# Patient Record
Sex: FEMALE | Race: WHITE | NOT HISPANIC OR LATINO | Employment: OTHER | RURAL
[De-identification: names, ages, dates, MRNs, and addresses within clinical notes are randomized per-mention and may not be internally consistent; named-entity substitution may affect disease eponyms.]

---

## 2019-07-16 ENCOUNTER — HISTORICAL (OUTPATIENT)
Dept: ADMINISTRATIVE | Facility: HOSPITAL | Age: 64
End: 2019-07-16

## 2019-07-17 LAB
LAB AP CLINICAL INFORMATION: NORMAL
LAB AP DIAGNOSIS - HISTORICAL: NORMAL
LAB AP GROSS PATHOLOGY - HISTORICAL: NORMAL
LAB AP SPECIMEN SUBMITTED - HISTORICAL: NORMAL

## 2020-03-11 ENCOUNTER — HISTORICAL (OUTPATIENT)
Dept: ADMINISTRATIVE | Facility: HOSPITAL | Age: 65
End: 2020-03-11

## 2020-04-22 ENCOUNTER — HISTORICAL (OUTPATIENT)
Dept: ADMINISTRATIVE | Facility: HOSPITAL | Age: 65
End: 2020-04-22

## 2020-09-24 ENCOUNTER — HISTORICAL (OUTPATIENT)
Dept: ADMINISTRATIVE | Facility: HOSPITAL | Age: 65
End: 2020-09-24

## 2020-10-20 ENCOUNTER — HISTORICAL (OUTPATIENT)
Dept: ADMINISTRATIVE | Facility: HOSPITAL | Age: 65
End: 2020-10-20

## 2021-05-26 ENCOUNTER — OFFICE VISIT (OUTPATIENT)
Dept: VASCULAR SURGERY | Facility: CLINIC | Age: 66
End: 2021-05-26
Payer: MEDICARE

## 2021-05-26 VITALS
WEIGHT: 208.63 LBS | HEIGHT: 67 IN | HEART RATE: 80 BPM | RESPIRATION RATE: 12 BRPM | SYSTOLIC BLOOD PRESSURE: 120 MMHG | BODY MASS INDEX: 32.74 KG/M2 | DIASTOLIC BLOOD PRESSURE: 84 MMHG

## 2021-05-26 DIAGNOSIS — M79.672 PAIN IN BOTH FEET: ICD-10-CM

## 2021-05-26 DIAGNOSIS — R23.8 OTHER SKIN CHANGES: Primary | ICD-10-CM

## 2021-05-26 DIAGNOSIS — M79.671 PAIN IN BOTH FEET: ICD-10-CM

## 2021-05-26 DIAGNOSIS — Z86.711 HISTORY OF PULMONARY EMBOLUS (PE): ICD-10-CM

## 2021-05-26 DIAGNOSIS — M79.605 LEG PAIN, BILATERAL: Primary | ICD-10-CM

## 2021-05-26 DIAGNOSIS — Z86.718 HISTORY OF DVT (DEEP VEIN THROMBOSIS): ICD-10-CM

## 2021-05-26 DIAGNOSIS — M79.604 LEG PAIN, BILATERAL: Primary | ICD-10-CM

## 2021-05-26 PROCEDURE — 99214 OFFICE O/P EST MOD 30 MIN: CPT | Mod: S$PBB,,, | Performed by: NURSE PRACTITIONER

## 2021-05-26 PROCEDURE — 99213 OFFICE O/P EST LOW 20 MIN: CPT | Mod: PBBFAC | Performed by: NURSE PRACTITIONER

## 2021-05-26 PROCEDURE — 99214 PR OFFICE/OUTPT VISIT, EST, LEVL IV, 30-39 MIN: ICD-10-PCS | Mod: S$PBB,,, | Performed by: NURSE PRACTITIONER

## 2021-05-26 RX ORDER — CYANOCOBALAMIN 1000 UG/ML
1000 INJECTION, SOLUTION INTRAMUSCULAR; SUBCUTANEOUS
COMMUNITY
Start: 2021-05-13 | End: 2022-06-02

## 2021-05-26 RX ORDER — LANOLIN ALCOHOL/MO/W.PET/CERES
1 CREAM (GRAM) TOPICAL DAILY
COMMUNITY
Start: 2021-02-19

## 2021-05-26 RX ORDER — SERTRALINE HYDROCHLORIDE 100 MG/1
100 TABLET, FILM COATED ORAL DAILY
COMMUNITY
Start: 2021-04-06 | End: 2022-01-12

## 2021-05-26 RX ORDER — RANOLAZINE 500 MG/1
500 TABLET, EXTENDED RELEASE ORAL 2 TIMES DAILY
COMMUNITY
Start: 2021-05-25

## 2021-05-26 RX ORDER — POTASSIUM CHLORIDE 750 MG/1
10 TABLET, EXTENDED RELEASE ORAL DAILY
COMMUNITY
Start: 2021-04-20 | End: 2021-11-11 | Stop reason: SDUPTHER

## 2021-05-26 RX ORDER — ASCORBIC ACID 500 MG
500 TABLET ORAL DAILY
COMMUNITY
End: 2022-05-17

## 2021-05-26 RX ORDER — ACETAMINOPHEN 500 MG
5000 TABLET ORAL DAILY
COMMUNITY
End: 2022-05-17

## 2021-05-26 RX ORDER — LEVOTHYROXINE SODIUM 25 UG/1
25 TABLET ORAL DAILY
COMMUNITY
Start: 2021-04-22 | End: 2021-07-29 | Stop reason: SDUPTHER

## 2021-05-26 RX ORDER — EZETIMIBE 10 MG/1
10 TABLET ORAL DAILY
COMMUNITY
Start: 2021-05-25

## 2021-05-26 RX ORDER — FUROSEMIDE 20 MG/1
20 TABLET ORAL DAILY
COMMUNITY
Start: 2021-03-08 | End: 2021-09-27 | Stop reason: SDUPTHER

## 2021-05-26 RX ORDER — LACTULOSE 10 G/15ML
SOLUTION ORAL; RECTAL
COMMUNITY
Start: 2021-05-10 | End: 2021-11-11

## 2021-05-26 RX ORDER — OMEPRAZOLE 20 MG/1
20 CAPSULE, DELAYED RELEASE ORAL DAILY
COMMUNITY
Start: 2021-05-07 | End: 2022-02-25

## 2021-05-26 RX ORDER — LOSARTAN POTASSIUM 50 MG/1
TABLET ORAL
COMMUNITY
Start: 2021-04-19 | End: 2022-05-17

## 2021-06-30 RX ORDER — SPIRONOLACTONE 25 MG/1
25 TABLET ORAL DAILY
Qty: 90 TABLET | Refills: 1 | Status: SHIPPED | OUTPATIENT
Start: 2021-06-30 | End: 2022-01-03

## 2021-06-30 RX ORDER — SPIRONOLACTONE 25 MG/1
25 TABLET ORAL DAILY
COMMUNITY
Start: 2021-03-25 | End: 2021-06-30 | Stop reason: SDUPTHER

## 2021-07-08 ENCOUNTER — OFFICE VISIT (OUTPATIENT)
Dept: FAMILY MEDICINE | Facility: CLINIC | Age: 66
End: 2021-07-08
Payer: MEDICARE

## 2021-07-08 VITALS
TEMPERATURE: 98 F | DIASTOLIC BLOOD PRESSURE: 85 MMHG | BODY MASS INDEX: 33.69 KG/M2 | OXYGEN SATURATION: 98 % | HEART RATE: 64 BPM | SYSTOLIC BLOOD PRESSURE: 141 MMHG | WEIGHT: 214.63 LBS | HEIGHT: 67 IN

## 2021-07-08 DIAGNOSIS — R30.0 DYSURIA: Primary | ICD-10-CM

## 2021-07-08 DIAGNOSIS — N39.3 STRESS INCONTINENCE OF URINE: ICD-10-CM

## 2021-07-08 DIAGNOSIS — N30.01 ACUTE CYSTITIS WITH HEMATURIA: ICD-10-CM

## 2021-07-08 LAB
BILIRUB UR QL STRIP: NEGATIVE
GLUCOSE UR QL STRIP: NEGATIVE
KETONES UR QL STRIP: NEGATIVE
LEUKOCYTE ESTERASE UR QL STRIP: POSITIVE
PH, POC UA: 8.5
POC BLOOD, URINE: POSITIVE
POC NITRATES, URINE: POSITIVE
PROT UR QL STRIP: POSITIVE
SP GR UR STRIP: 1.02 (ref 1–1.03)
UROBILINOGEN UR STRIP-ACNC: 0.2 (ref 0.1–1.1)

## 2021-07-08 PROCEDURE — 99213 PR OFFICE/OUTPT VISIT, EST, LEVL III, 20-29 MIN: ICD-10-PCS | Mod: ,,, | Performed by: FAMILY MEDICINE

## 2021-07-08 PROCEDURE — 81003 URINALYSIS AUTO W/O SCOPE: CPT | Mod: RHCUB | Performed by: FAMILY MEDICINE

## 2021-07-08 PROCEDURE — 99213 OFFICE O/P EST LOW 20 MIN: CPT | Mod: ,,, | Performed by: FAMILY MEDICINE

## 2021-07-08 RX ORDER — CIPROFLOXACIN 250 MG/1
250 TABLET, FILM COATED ORAL 2 TIMES DAILY
Qty: 14 TABLET | Refills: 0 | Status: SHIPPED | OUTPATIENT
Start: 2021-07-08 | End: 2021-10-27

## 2021-07-08 RX ORDER — WARFARIN SODIUM 5 MG/1
5 TABLET ORAL DAILY
COMMUNITY
Start: 2021-05-25

## 2021-07-29 RX ORDER — LEVOTHYROXINE SODIUM 25 UG/1
25 TABLET ORAL DAILY
Qty: 90 TABLET | Refills: 1 | Status: SHIPPED | OUTPATIENT
Start: 2021-07-29 | End: 2022-02-04

## 2021-08-24 ENCOUNTER — OFFICE VISIT (OUTPATIENT)
Dept: OBSTETRICS AND GYNECOLOGY | Facility: CLINIC | Age: 66
End: 2021-08-24
Payer: MEDICARE

## 2021-08-24 VITALS
BODY MASS INDEX: 33.65 KG/M2 | DIASTOLIC BLOOD PRESSURE: 76 MMHG | HEIGHT: 67 IN | WEIGHT: 214.38 LBS | SYSTOLIC BLOOD PRESSURE: 136 MMHG

## 2021-08-24 DIAGNOSIS — Z12.4 SCREENING FOR MALIGNANT NEOPLASM OF THE CERVIX: Primary | ICD-10-CM

## 2021-08-24 DIAGNOSIS — R30.0 DYSURIA: ICD-10-CM

## 2021-08-24 DIAGNOSIS — R32 URINARY INCONTINENCE, UNSPECIFIED TYPE: ICD-10-CM

## 2021-08-24 LAB
BACTERIA #/AREA URNS HPF: ABNORMAL /HPF
BILIRUB UR QL STRIP: NEGATIVE
CLARITY UR: CLEAR
COLOR UR: YELLOW
GLUCOSE UR STRIP-MCNC: NEGATIVE MG/DL
HYALINE CASTS #/AREA URNS LPF: ABNORMAL /LPF
KETONES UR STRIP-SCNC: NEGATIVE MG/DL
LEUKOCYTE ESTERASE UR QL STRIP: NEGATIVE
NITRITE UR QL STRIP: NEGATIVE
PH UR STRIP: 6 PH UNITS
PROT UR QL STRIP: NEGATIVE
RBC # UR STRIP: NEGATIVE /UL
RBC #/AREA URNS HPF: ABNORMAL /HPF
SP GR UR STRIP: 1.01
UROBILINOGEN UR STRIP-ACNC: 0.2 MG/DL
WBC #/AREA URNS HPF: ABNORMAL /HPF

## 2021-08-24 PROCEDURE — G0123 SCREEN CERV/VAG THIN LAYER: HCPCS | Mod: GCY | Performed by: OBSTETRICS & GYNECOLOGY

## 2021-08-24 PROCEDURE — 87086 CULTURE, URINE: ICD-10-PCS | Mod: ,,, | Performed by: CLINICAL MEDICAL LABORATORY

## 2021-08-24 PROCEDURE — 99214 OFFICE O/P EST MOD 30 MIN: CPT | Mod: PBBFAC | Performed by: OBSTETRICS & GYNECOLOGY

## 2021-08-24 PROCEDURE — 81001 URINALYSIS: ICD-10-PCS | Mod: ,,, | Performed by: CLINICAL MEDICAL LABORATORY

## 2021-08-24 PROCEDURE — 99203 PR OFFICE/OUTPT VISIT, NEW, LEVL III, 30-44 MIN: ICD-10-PCS | Mod: S$PBB,,, | Performed by: OBSTETRICS & GYNECOLOGY

## 2021-08-24 PROCEDURE — 99203 OFFICE O/P NEW LOW 30 MIN: CPT | Mod: S$PBB,,, | Performed by: OBSTETRICS & GYNECOLOGY

## 2021-08-24 PROCEDURE — 81001 URINALYSIS AUTO W/SCOPE: CPT | Mod: ,,, | Performed by: CLINICAL MEDICAL LABORATORY

## 2021-08-24 PROCEDURE — 87086 URINE CULTURE/COLONY COUNT: CPT | Mod: ,,, | Performed by: CLINICAL MEDICAL LABORATORY

## 2021-08-26 LAB
GH SERPL-MCNC: NORMAL NG/ML
INSULIN SERPL-ACNC: NORMAL U[IU]/ML
LAB AP CLINICAL INFORMATION: NORMAL
LAB AP GYN INTERPRETATION: NEGATIVE
LAB AP PAP DISCLAIMER COMMENTS: NORMAL
RENIN PLAS-CCNC: NORMAL NG/ML/H
UA COMPLETE W REFLEX CULTURE PNL UR: NO GROWTH

## 2021-09-20 ENCOUNTER — OFFICE VISIT (OUTPATIENT)
Dept: OBSTETRICS AND GYNECOLOGY | Facility: CLINIC | Age: 66
End: 2021-09-20
Payer: MEDICARE

## 2021-09-20 VITALS — DIASTOLIC BLOOD PRESSURE: 84 MMHG | SYSTOLIC BLOOD PRESSURE: 130 MMHG

## 2021-09-20 DIAGNOSIS — R30.0 DYSURIA: Primary | ICD-10-CM

## 2021-09-20 LAB
BACTERIA #/AREA URNS HPF: ABNORMAL /HPF
BILIRUB UR QL STRIP: NEGATIVE
CLARITY UR: CLEAR
COLOR UR: YELLOW
GLUCOSE UR STRIP-MCNC: NEGATIVE MG/DL
KETONES UR STRIP-SCNC: NEGATIVE MG/DL
LEUKOCYTE ESTERASE UR QL STRIP: ABNORMAL
NITRITE UR QL STRIP: NEGATIVE
PH UR STRIP: 6.5 PH UNITS
PROT UR QL STRIP: NEGATIVE
RBC # UR STRIP: ABNORMAL /UL
RBC #/AREA URNS HPF: ABNORMAL /HPF
SP GR UR STRIP: 1.01
UROBILINOGEN UR STRIP-ACNC: 0.2 MG/DL
WBC #/AREA URNS HPF: ABNORMAL /HPF
WBC CLUMPS, UA: ABNORMAL /HPF

## 2021-09-20 PROCEDURE — 51725 SIMPLE CYSTOMETROGRAM: CPT | Mod: 26,S$PBB,, | Performed by: OBSTETRICS & GYNECOLOGY

## 2021-09-20 PROCEDURE — 87086 URINE CULTURE/COLONY COUNT: CPT | Mod: ,,, | Performed by: CLINICAL MEDICAL LABORATORY

## 2021-09-20 PROCEDURE — 81001 URINALYSIS: ICD-10-PCS | Mod: ,,, | Performed by: CLINICAL MEDICAL LABORATORY

## 2021-09-20 PROCEDURE — 87186 SC STD MICRODIL/AGAR DIL: CPT | Mod: ,,, | Performed by: CLINICAL MEDICAL LABORATORY

## 2021-09-20 PROCEDURE — 87077 CULTURE, URINE: ICD-10-PCS | Mod: ,,, | Performed by: CLINICAL MEDICAL LABORATORY

## 2021-09-20 PROCEDURE — 87086 CULTURE, URINE: ICD-10-PCS | Mod: ,,, | Performed by: CLINICAL MEDICAL LABORATORY

## 2021-09-20 PROCEDURE — 87077 CULTURE AEROBIC IDENTIFY: CPT | Mod: ,,, | Performed by: CLINICAL MEDICAL LABORATORY

## 2021-09-20 PROCEDURE — 87186 CULTURE, URINE: ICD-10-PCS | Mod: ,,, | Performed by: CLINICAL MEDICAL LABORATORY

## 2021-09-20 PROCEDURE — 99214 OFFICE O/P EST MOD 30 MIN: CPT | Mod: PBBFAC,25 | Performed by: OBSTETRICS & GYNECOLOGY

## 2021-09-20 PROCEDURE — 81001 URINALYSIS AUTO W/SCOPE: CPT | Mod: ,,, | Performed by: CLINICAL MEDICAL LABORATORY

## 2021-09-20 PROCEDURE — 51725 SIMPLE CYSTOMETROGRAM: CPT | Mod: PBBFAC | Performed by: OBSTETRICS & GYNECOLOGY

## 2021-09-20 PROCEDURE — 51725 PR SIMPLE CYSTOMETROGRAM: ICD-10-PCS | Mod: 26,S$PBB,, | Performed by: OBSTETRICS & GYNECOLOGY

## 2021-09-21 ENCOUNTER — TELEPHONE (OUTPATIENT)
Dept: OBSTETRICS AND GYNECOLOGY | Facility: CLINIC | Age: 66
End: 2021-09-21

## 2021-09-22 ENCOUNTER — TELEPHONE (OUTPATIENT)
Dept: OBSTETRICS AND GYNECOLOGY | Facility: CLINIC | Age: 66
End: 2021-09-22

## 2021-09-22 LAB — UA COMPLETE W REFLEX CULTURE PNL UR: ABNORMAL

## 2021-09-27 RX ORDER — FUROSEMIDE 20 MG/1
20 TABLET ORAL DAILY
Qty: 90 TABLET | Refills: 0 | Status: SHIPPED | OUTPATIENT
Start: 2021-09-27 | End: 2022-01-12

## 2021-10-27 ENCOUNTER — OFFICE VISIT (OUTPATIENT)
Dept: FAMILY MEDICINE | Facility: CLINIC | Age: 66
End: 2021-10-27
Payer: MEDICARE

## 2021-10-27 VITALS
HEART RATE: 79 BPM | DIASTOLIC BLOOD PRESSURE: 81 MMHG | TEMPERATURE: 98 F | BODY MASS INDEX: 33.33 KG/M2 | HEIGHT: 67 IN | SYSTOLIC BLOOD PRESSURE: 115 MMHG | WEIGHT: 212.38 LBS | OXYGEN SATURATION: 97 %

## 2021-10-27 DIAGNOSIS — M54.2 NECK PAIN: ICD-10-CM

## 2021-10-27 DIAGNOSIS — M62.838 TRAPEZIUS MUSCLE SPASM: ICD-10-CM

## 2021-10-27 DIAGNOSIS — R30.0 DYSURIA: Primary | ICD-10-CM

## 2021-10-27 DIAGNOSIS — R53.83 FATIGUE, UNSPECIFIED TYPE: ICD-10-CM

## 2021-10-27 LAB
ANION GAP SERPL CALCULATED.3IONS-SCNC: 8 MMOL/L (ref 7–16)
BASOPHILS # BLD AUTO: 0.04 K/UL (ref 0–0.2)
BASOPHILS NFR BLD AUTO: 0.5 % (ref 0–1)
BILIRUB SERPL-MCNC: NEGATIVE MG/DL
BLOOD URINE, POC: ABNORMAL
BUN SERPL-MCNC: 15 MG/DL (ref 7–18)
BUN/CREAT SERPL: 13 (ref 6–20)
CALCIUM SERPL-MCNC: 9.2 MG/DL (ref 8.5–10.1)
CHLORIDE SERPL-SCNC: 105 MMOL/L (ref 98–107)
CO2 SERPL-SCNC: 28 MMOL/L (ref 21–32)
COLOR, POC UA: YELLOW
CREAT SERPL-MCNC: 1.19 MG/DL (ref 0.55–1.02)
DIFFERENTIAL METHOD BLD: ABNORMAL
EOSINOPHIL # BLD AUTO: 0.18 K/UL (ref 0–0.5)
EOSINOPHIL NFR BLD AUTO: 2.4 % (ref 1–4)
ERYTHROCYTE [DISTWIDTH] IN BLOOD BY AUTOMATED COUNT: 12.6 % (ref 11.5–14.5)
GLUCOSE SERPL-MCNC: 107 MG/DL (ref 74–106)
GLUCOSE UR QL STRIP: NEGATIVE
HCT VFR BLD AUTO: 42.5 % (ref 38–47)
HGB BLD-MCNC: 14.5 G/DL (ref 12–16)
IMM GRANULOCYTES # BLD AUTO: 0.02 K/UL (ref 0–0.04)
IMM GRANULOCYTES NFR BLD: 0.3 % (ref 0–0.4)
KETONES UR QL STRIP: NEGATIVE
LEUKOCYTE ESTERASE URINE, POC: ABNORMAL
LYMPHOCYTES # BLD AUTO: 2.05 K/UL (ref 1–4.8)
LYMPHOCYTES NFR BLD AUTO: 27.6 % (ref 27–41)
MCH RBC QN AUTO: 31.6 PG (ref 27–31)
MCHC RBC AUTO-ENTMCNC: 34.1 G/DL (ref 32–36)
MCV RBC AUTO: 92.6 FL (ref 80–96)
MONOCYTES # BLD AUTO: 0.77 K/UL (ref 0–0.8)
MONOCYTES NFR BLD AUTO: 10.4 % (ref 2–6)
MPC BLD CALC-MCNC: 9.9 FL (ref 9.4–12.4)
NEUTROPHILS # BLD AUTO: 4.37 K/UL (ref 1.8–7.7)
NEUTROPHILS NFR BLD AUTO: 58.8 % (ref 53–65)
NITRITE, POC UA: POSITIVE
NRBC # BLD AUTO: 0 X10E3/UL
NRBC, AUTO (.00): 0 %
PH, POC UA: 7
PLATELET # BLD AUTO: 234 K/UL (ref 150–400)
POTASSIUM SERPL-SCNC: 4.4 MMOL/L (ref 3.5–5.1)
PROTEIN, POC: NEGATIVE
RBC # BLD AUTO: 4.59 M/UL (ref 4.2–5.4)
SODIUM SERPL-SCNC: 137 MMOL/L (ref 136–145)
SPECIFIC GRAVITY, POC UA: 1.02
T4 FREE SERPL-MCNC: 1.23 NG/DL (ref 0.76–1.46)
TSH SERPL DL<=0.005 MIU/L-ACNC: 2.02 UIU/ML (ref 0.36–3.74)
UROBILINOGEN, POC UA: 0.2
WBC # BLD AUTO: 7.43 K/UL (ref 4.5–11)

## 2021-10-27 PROCEDURE — 20552 NJX 1/MLT TRIGGER POINT 1/2: CPT | Mod: ,,, | Performed by: FAMILY MEDICINE

## 2021-10-27 PROCEDURE — 84443 ASSAY THYROID STIM HORMONE: CPT | Mod: ,,, | Performed by: CLINICAL MEDICAL LABORATORY

## 2021-10-27 PROCEDURE — 85025 COMPLETE CBC W/AUTO DIFF WBC: CPT | Mod: ,,, | Performed by: CLINICAL MEDICAL LABORATORY

## 2021-10-27 PROCEDURE — 85025 CBC WITH DIFFERENTIAL: ICD-10-PCS | Mod: ,,, | Performed by: CLINICAL MEDICAL LABORATORY

## 2021-10-27 PROCEDURE — 84439 T4, FREE: ICD-10-PCS | Mod: ,,, | Performed by: CLINICAL MEDICAL LABORATORY

## 2021-10-27 PROCEDURE — 20552 TRIGGER POINT INJECTION: ICD-10-PCS | Mod: ,,, | Performed by: FAMILY MEDICINE

## 2021-10-27 PROCEDURE — 84439 ASSAY OF FREE THYROXINE: CPT | Mod: ,,, | Performed by: CLINICAL MEDICAL LABORATORY

## 2021-10-27 PROCEDURE — 80048 BASIC METABOLIC PANEL: ICD-10-PCS | Mod: ,,, | Performed by: CLINICAL MEDICAL LABORATORY

## 2021-10-27 PROCEDURE — 84443 TSH: ICD-10-PCS | Mod: ,,, | Performed by: CLINICAL MEDICAL LABORATORY

## 2021-10-27 PROCEDURE — 99214 PR OFFICE/OUTPT VISIT, EST, LEVL IV, 30-39 MIN: ICD-10-PCS | Mod: ,,, | Performed by: FAMILY MEDICINE

## 2021-10-27 PROCEDURE — 80048 BASIC METABOLIC PNL TOTAL CA: CPT | Mod: ,,, | Performed by: CLINICAL MEDICAL LABORATORY

## 2021-10-27 PROCEDURE — 99214 OFFICE O/P EST MOD 30 MIN: CPT | Mod: ,,, | Performed by: FAMILY MEDICINE

## 2021-10-27 PROCEDURE — 81003 URINALYSIS AUTO W/O SCOPE: CPT | Mod: RHCUB | Performed by: FAMILY MEDICINE

## 2021-10-27 RX ORDER — METHYLPREDNISOLONE ACETATE 80 MG/ML
40 INJECTION, SUSPENSION INTRA-ARTICULAR; INTRALESIONAL; INTRAMUSCULAR; SOFT TISSUE
Status: DISCONTINUED | OUTPATIENT
Start: 2021-10-27 | End: 2021-10-27 | Stop reason: HOSPADM

## 2021-10-27 RX ORDER — SULFAMETHOXAZOLE AND TRIMETHOPRIM 800; 160 MG/1; MG/1
1 TABLET ORAL 2 TIMES DAILY
Qty: 20 TABLET | Refills: 0 | Status: SHIPPED | OUTPATIENT
Start: 2021-10-27 | End: 2021-11-11 | Stop reason: ALTCHOICE

## 2021-10-27 RX ORDER — OXYBUTYNIN CHLORIDE 5 MG/1
1 TABLET ORAL 2 TIMES DAILY
COMMUNITY
Start: 2021-09-21

## 2021-10-27 RX ADMIN — METHYLPREDNISOLONE ACETATE 40 MG: 80 INJECTION, SUSPENSION INTRA-ARTICULAR; INTRALESIONAL; INTRAMUSCULAR; SOFT TISSUE at 08:10

## 2021-11-11 ENCOUNTER — OFFICE VISIT (OUTPATIENT)
Dept: FAMILY MEDICINE | Facility: CLINIC | Age: 66
End: 2021-11-11
Payer: MEDICARE

## 2021-11-11 VITALS
HEART RATE: 71 BPM | BODY MASS INDEX: 33.56 KG/M2 | HEIGHT: 67 IN | DIASTOLIC BLOOD PRESSURE: 78 MMHG | WEIGHT: 213.81 LBS | TEMPERATURE: 97 F | OXYGEN SATURATION: 95 % | SYSTOLIC BLOOD PRESSURE: 123 MMHG

## 2021-11-11 DIAGNOSIS — R05.9 COUGH: ICD-10-CM

## 2021-11-11 DIAGNOSIS — J01.00 ACUTE NON-RECURRENT MAXILLARY SINUSITIS: Primary | ICD-10-CM

## 2021-11-11 PROCEDURE — 99213 PR OFFICE/OUTPT VISIT, EST, LEVL III, 20-29 MIN: ICD-10-PCS | Mod: 25,,, | Performed by: FAMILY MEDICINE

## 2021-11-11 PROCEDURE — 96372 THER/PROPH/DIAG INJ SC/IM: CPT | Mod: ,,, | Performed by: FAMILY MEDICINE

## 2021-11-11 PROCEDURE — 99213 OFFICE O/P EST LOW 20 MIN: CPT | Mod: 25,,, | Performed by: FAMILY MEDICINE

## 2021-11-11 PROCEDURE — 96372 PR INJECTION,THERAP/PROPH/DIAG2ST, IM OR SUBCUT: ICD-10-PCS | Mod: ,,, | Performed by: FAMILY MEDICINE

## 2021-11-11 RX ORDER — CIPROFLOXACIN 250 MG/1
250 TABLET, FILM COATED ORAL 2 TIMES DAILY
Qty: 20 TABLET | Refills: 0 | Status: SHIPPED | OUTPATIENT
Start: 2021-11-11 | End: 2022-05-17 | Stop reason: ALTCHOICE

## 2021-11-11 RX ORDER — CEFTRIAXONE 1 G/1
1 INJECTION, POWDER, FOR SOLUTION INTRAMUSCULAR; INTRAVENOUS
Status: COMPLETED | OUTPATIENT
Start: 2021-11-11 | End: 2021-11-11

## 2021-11-11 RX ORDER — METHYLPREDNISOLONE ACETATE 80 MG/ML
40 INJECTION, SUSPENSION INTRA-ARTICULAR; INTRALESIONAL; INTRAMUSCULAR; SOFT TISSUE
Status: COMPLETED | OUTPATIENT
Start: 2021-11-11 | End: 2021-11-11

## 2021-11-11 RX ORDER — DEXAMETHASONE SODIUM PHOSPHATE 4 MG/ML
4 INJECTION, SOLUTION INTRA-ARTICULAR; INTRALESIONAL; INTRAMUSCULAR; INTRAVENOUS; SOFT TISSUE
Status: COMPLETED | OUTPATIENT
Start: 2021-11-11 | End: 2021-11-11

## 2021-11-11 RX ORDER — POTASSIUM CHLORIDE 750 MG/1
10 TABLET, EXTENDED RELEASE ORAL DAILY
Qty: 90 TABLET | Refills: 1 | Status: SHIPPED | OUTPATIENT
Start: 2021-11-11 | End: 2023-07-17 | Stop reason: SDUPTHER

## 2021-11-11 RX ADMIN — CEFTRIAXONE 1 G: 1 INJECTION, POWDER, FOR SOLUTION INTRAMUSCULAR; INTRAVENOUS at 10:11

## 2021-11-11 RX ADMIN — DEXAMETHASONE SODIUM PHOSPHATE 4 MG: 4 INJECTION, SOLUTION INTRA-ARTICULAR; INTRALESIONAL; INTRAMUSCULAR; INTRAVENOUS; SOFT TISSUE at 10:11

## 2021-11-11 RX ADMIN — METHYLPREDNISOLONE ACETATE 40 MG: 80 INJECTION, SUSPENSION INTRA-ARTICULAR; INTRALESIONAL; INTRAMUSCULAR; SOFT TISSUE at 10:11

## 2021-12-22 ENCOUNTER — PATIENT OUTREACH (OUTPATIENT)
Dept: FAMILY MEDICINE | Facility: CLINIC | Age: 66
End: 2021-12-22
Payer: MEDICARE

## 2021-12-30 ENCOUNTER — OFFICE VISIT (OUTPATIENT)
Dept: FAMILY MEDICINE | Facility: CLINIC | Age: 66
End: 2021-12-30
Payer: MEDICARE

## 2021-12-30 VITALS
HEART RATE: 60 BPM | WEIGHT: 209 LBS | BODY MASS INDEX: 32.8 KG/M2 | HEIGHT: 67 IN | OXYGEN SATURATION: 98 % | DIASTOLIC BLOOD PRESSURE: 91 MMHG | TEMPERATURE: 97 F | SYSTOLIC BLOOD PRESSURE: 149 MMHG

## 2021-12-30 DIAGNOSIS — R30.0 DYSURIA: Primary | ICD-10-CM

## 2021-12-30 DIAGNOSIS — N30.01 ACUTE CYSTITIS WITH HEMATURIA: ICD-10-CM

## 2021-12-30 LAB
BILIRUB SERPL-MCNC: ABNORMAL MG/DL
BLOOD URINE, POC: ABNORMAL
COLOR, POC UA: YELLOW
GLUCOSE UR QL STRIP: NEGATIVE
KETONES UR QL STRIP: NEGATIVE
LEUKOCYTE ESTERASE URINE, POC: ABNORMAL
NITRITE, POC UA: NEGATIVE
PH, POC UA: 7
PROTEIN, POC: ABNORMAL
SPECIFIC GRAVITY, POC UA: >1.03
UROBILINOGEN, POC UA: 0.2

## 2021-12-30 PROCEDURE — 96372 PR INJECTION,THERAP/PROPH/DIAG2ST, IM OR SUBCUT: ICD-10-PCS | Mod: ,,, | Performed by: FAMILY MEDICINE

## 2021-12-30 PROCEDURE — 99213 OFFICE O/P EST LOW 20 MIN: CPT | Mod: 25,,, | Performed by: FAMILY MEDICINE

## 2021-12-30 PROCEDURE — 99213 PR OFFICE/OUTPT VISIT, EST, LEVL III, 20-29 MIN: ICD-10-PCS | Mod: 25,,, | Performed by: FAMILY MEDICINE

## 2021-12-30 PROCEDURE — 96372 THER/PROPH/DIAG INJ SC/IM: CPT | Mod: ,,, | Performed by: FAMILY MEDICINE

## 2021-12-30 PROCEDURE — 81003 URINALYSIS AUTO W/O SCOPE: CPT | Mod: RHCUB | Performed by: FAMILY MEDICINE

## 2021-12-30 RX ORDER — PHENAZOPYRIDINE HYDROCHLORIDE 100 MG/1
100 TABLET, FILM COATED ORAL 3 TIMES DAILY PRN
Qty: 12 TABLET | Refills: 1 | Status: SHIPPED | OUTPATIENT
Start: 2021-12-30 | End: 2022-01-09

## 2021-12-30 RX ORDER — SULFAMETHOXAZOLE AND TRIMETHOPRIM 800; 160 MG/1; MG/1
1 TABLET ORAL 2 TIMES DAILY
Qty: 14 TABLET | Refills: 0 | Status: SHIPPED | OUTPATIENT
Start: 2021-12-30 | End: 2022-05-17 | Stop reason: ALTCHOICE

## 2021-12-30 RX ORDER — CEFTRIAXONE 1 G/1
1 INJECTION, POWDER, FOR SOLUTION INTRAMUSCULAR; INTRAVENOUS
Status: COMPLETED | OUTPATIENT
Start: 2021-12-30 | End: 2021-12-30

## 2021-12-30 RX ADMIN — CEFTRIAXONE 1 G: 1 INJECTION, POWDER, FOR SOLUTION INTRAMUSCULAR; INTRAVENOUS at 11:12

## 2022-02-23 ENCOUNTER — HOSPITAL ENCOUNTER (OUTPATIENT)
Dept: RADIOLOGY | Facility: HOSPITAL | Age: 67
Discharge: HOME | End: 2022-02-23
Attending: RADIOLOGY
Payer: MEDICARE

## 2022-02-23 VITALS — WEIGHT: 211 LBS | HEIGHT: 67 IN | BODY MASS INDEX: 33.12 KG/M2

## 2022-02-23 DIAGNOSIS — N63.0 BREAST LUMP: ICD-10-CM

## 2022-02-23 PROCEDURE — 77066 DX MAMMO INCL CAD BI: CPT | Mod: 26,,, | Performed by: RADIOLOGY

## 2022-02-23 PROCEDURE — 76641 ULTRASOUND BREAST COMPLETE: CPT | Mod: TC,50

## 2022-02-23 PROCEDURE — 76641 US BREAST BILATERAL COMPLETE: ICD-10-PCS | Mod: 26,50,, | Performed by: RADIOLOGY

## 2022-02-23 PROCEDURE — 76641 ULTRASOUND BREAST COMPLETE: CPT | Mod: 26,50,, | Performed by: RADIOLOGY

## 2022-02-23 PROCEDURE — 77066 MAMMO DIGITAL DIAGNOSTIC BILAT: ICD-10-PCS | Mod: 26,,, | Performed by: RADIOLOGY

## 2022-02-23 PROCEDURE — 77066 DX MAMMO INCL CAD BI: CPT | Mod: TC

## 2022-03-11 DIAGNOSIS — Z71.89 COMPLEX CARE COORDINATION: ICD-10-CM

## 2022-05-17 ENCOUNTER — OFFICE VISIT (OUTPATIENT)
Dept: FAMILY MEDICINE | Facility: CLINIC | Age: 67
End: 2022-05-17
Payer: MEDICARE

## 2022-05-17 VITALS
HEIGHT: 67 IN | SYSTOLIC BLOOD PRESSURE: 123 MMHG | WEIGHT: 214.19 LBS | HEART RATE: 69 BPM | TEMPERATURE: 98 F | OXYGEN SATURATION: 98 % | BODY MASS INDEX: 33.62 KG/M2 | DIASTOLIC BLOOD PRESSURE: 88 MMHG

## 2022-05-17 DIAGNOSIS — J01.01 ACUTE RECURRENT MAXILLARY SINUSITIS: ICD-10-CM

## 2022-05-17 DIAGNOSIS — R53.1 WEAKNESS: ICD-10-CM

## 2022-05-17 DIAGNOSIS — H93.13 TINNITUS OF BOTH EARS: ICD-10-CM

## 2022-05-17 DIAGNOSIS — R29.6 FREQUENT FALLS: ICD-10-CM

## 2022-05-17 DIAGNOSIS — R30.0 DYSURIA: Primary | ICD-10-CM

## 2022-05-17 DIAGNOSIS — M62.838 TRAPEZIUS MUSCLE SPASM: ICD-10-CM

## 2022-05-17 LAB
BILIRUB SERPL-MCNC: NEGATIVE MG/DL
BLOOD URINE, POC: ABNORMAL
COLOR, POC UA: YELLOW
GLUCOSE UR QL STRIP: NEGATIVE
KETONES UR QL STRIP: NEGATIVE
LEUKOCYTE ESTERASE URINE, POC: ABNORMAL
NITRITE, POC UA: NEGATIVE
PH, POC UA: 6
PROTEIN, POC: NEGATIVE
SPECIFIC GRAVITY, POC UA: 1.02
UROBILINOGEN, POC UA: 0.2

## 2022-05-17 PROCEDURE — 81003 URINALYSIS AUTO W/O SCOPE: CPT | Mod: RHCUB | Performed by: FAMILY MEDICINE

## 2022-05-17 PROCEDURE — 99213 PR OFFICE/OUTPT VISIT, EST, LEVL III, 20-29 MIN: ICD-10-PCS | Mod: ,,, | Performed by: FAMILY MEDICINE

## 2022-05-17 PROCEDURE — 99213 OFFICE O/P EST LOW 20 MIN: CPT | Mod: ,,, | Performed by: FAMILY MEDICINE

## 2022-05-17 RX ORDER — NITROFURANTOIN 25; 75 MG/1; MG/1
100 CAPSULE ORAL DAILY
Qty: 30 CAPSULE | Refills: 6 | OUTPATIENT
Start: 2022-05-17 | End: 2022-08-12

## 2022-05-17 RX ORDER — AMOXICILLIN 500 MG/1
500 TABLET, FILM COATED ORAL EVERY 12 HOURS
Qty: 20 TABLET | Refills: 0 | Status: SHIPPED | OUTPATIENT
Start: 2022-05-17 | End: 2022-05-27

## 2022-05-18 DIAGNOSIS — Z01.00 ENCOUNTER FOR VISION SCREENING: Primary | ICD-10-CM

## 2022-05-29 PROBLEM — R53.1 WEAKNESS: Status: ACTIVE | Noted: 2022-05-29

## 2022-05-29 PROBLEM — H93.13 TINNITUS OF BOTH EARS: Status: ACTIVE | Noted: 2022-05-29

## 2022-05-29 PROBLEM — M62.838 TRAPEZIUS MUSCLE SPASM: Status: ACTIVE | Noted: 2022-05-29

## 2022-05-29 PROBLEM — R29.6 FREQUENT FALLS: Status: ACTIVE | Noted: 2022-05-29

## 2022-05-29 NOTE — PROGRESS NOTES
Tim Burgos MD   [facility]  [address] [place] [address]     PATIENT NAME: Victoria Delarosa  : 1955  DATE: 22  MRN: [id number]      Billing Provider: Tim Burgos MD  Level of Service: MD OFFICE/OUTPT VISIT, EST, LEVL III, 20-29 MIN  Patient PCP Information     Provider PCP Type    Tim Burgos MD General          Reason for Visit / Chief Complaint: Dysuria (Burning with dark colored urine that started this past Thursday. ), Neck Pain (Neck pain that increases at times with turning head. Patient reports feels as a nerve. Pain present for a while. ), and Fall (First Fall on Good Friday, Fall one week after and the last fall approximately 2 weeks ago. Patient reports unsure how feeling. Patient reports ringing ears with a few dizzy spells. )         History of Present Illness / Problem Focused Workflow     Victoria Delarosa presents to the clinic with Dysuria (Burning with dark colored urine that started this past Thursday. ), Neck Pain (Neck pain that increases at times with turning head. Patient reports feels as a nerve. Pain present for a while. ), and Fall (First Fall on Good Friday, Fall one week after and the last fall approximately 2 weeks ago. Patient reports unsure how feeling. Patient reports ringing ears with a few dizzy spells. )     HPI    Review of Systems     Review of Systems   Constitutional: Negative for activity change, appetite change, fatigue and fever.   HENT: Negative for nasal congestion, ear pain, hearing loss, sinus pressure/congestion and sore throat.    Respiratory: Negative for cough, chest tightness and shortness of breath.    Cardiovascular: Negative for chest pain and palpitations.   Gastrointestinal: Negative for abdominal pain and fecal incontinence.   Genitourinary: Positive for difficulty urinating, dysuria, flank pain and hematuria. Negative for bladder incontinence.   Musculoskeletal: Positive for neck pain and neck  stiffness. Negative for arthralgias.   Integumentary:  Negative for rash.   Neurological: Negative for dizziness and headaches.        Medical / Social / Family History     Past Medical History:   Diagnosis Date    Anxiety state     BMI 32.0-32.9,adult     Bronchitis     CHF (congestive heart failure)     Cobalamin deficiency     Deep vein thrombosis     Depressive disorder     GERD (gastroesophageal reflux disease)     Hypertension     Hypothyroidism     Lower extremity edema     Neuropathy     Osteoarthritis of both knees     Pain in left leg     Pain in right leg     Peripheral edema     Pulmonary embolism     Varicose veins of bilateral lower extremities with pain     Vitamin D deficiency        Past Surgical History:   Procedure Laterality Date    BILATERAL TUBAL LIGATION      CARDIAC CATHETERIZATION      COLONOSCOPY      knee scope Left     THYROIDECTOMY, PARTIAL      TONSILLECTOMY, ADENOIDECTOMY      TOTAL KNEE ARTHROPLASTY Right     TOTAL KNEE ARTHROPLASTY Right     VAGINAL DELIVERY      x 3       Social History  Victoria Delarosa  reports that she has never smoked. She has never used smokeless tobacco. She reports that she does not drink alcohol and does not use drugs.    Family History  Victoria Delarosa  family history is not on file.    Medications and Allergies     Medications  Outpatient Medications Marked as Taking for the 5/17/22 encounter (Office Visit) with Tim Burgos MD   Medication Sig Dispense Refill    cyanocobalamin 1,000 mcg/mL injection Inject 1,000 mcg into the muscle every 30 days.      ezetimibe (ZETIA) 10 mg tablet Take 10 mg by mouth once daily.      furosemide (LASIX) 20 MG tablet TAKE 1 TABLET BY MOUTH EVERY DAY 90 tablet 0    levothyroxine (SYNTHROID) 25 MCG tablet TAKE ONE TABLET BY MOUTH EVERY DAY 90 tablet 1    losartan (COZAAR) 50 MG tablet TAKE ONE TABLET BY MOUTH EVERY DAY FOR BLOOD PRESSURE 90 tablet 1    magnesium oxide (MAG-OX) 400 mg  (241.3 mg magnesium) tablet Take 1 tablet by mouth once daily.      omeprazole (PRILOSEC) 20 MG capsule TAKE ONE CAPSULE BY MOUTH DAILY 90 capsule 1    oxybutynin (DITROPAN) 5 MG Tab Take 1 tablet by mouth 2 (two) times daily.      potassium chloride (KLOR-CON) 10 MEQ TbSR Take 1 tablet (10 mEq total) by mouth once daily. 90 tablet 1    ranolazine (RANEXA) 500 MG Tb12 Take 500 mg by mouth 2 (two) times daily.      sertraline (ZOLOFT) 100 MG tablet TAKE ONE TABLET BY MOUTH DAILY 90 tablet 1    spironolactone (ALDACTONE) 25 MG tablet TAKE ONE TABLET BY MOUTH EVERY DAY 90 tablet 1    warfarin (COUMADIN) 5 MG tablet Take 5-7.5 mg by mouth. Take 5mg daily except on Thursday. Thursday take 7.5mg.         Allergies  Review of patient's allergies indicates:   Allergen Reactions    Lisinopril      Cough      Statins-hmg-coa reductase inhibitors Hives and Itching       Physical Examination     Vitals:    05/17/22 1025   BP: 123/88   Pulse: 69   Temp: 97.5 °F (36.4 °C)     Physical Exam  Constitutional:       General: She is not in acute distress.     Appearance: She is not ill-appearing.   HENT:      Head: Normocephalic and atraumatic.      Right Ear: Tympanic membrane and ear canal normal.      Left Ear: Tympanic membrane and ear canal normal.      Nose: Nose normal. No congestion or rhinorrhea.   Eyes:      Pupils: Pupils are equal, round, and reactive to light.   Cardiovascular:      Rate and Rhythm: Normal rate and regular rhythm.      Pulses: Normal pulses.      Heart sounds: No murmur heard.  Pulmonary:      Effort: No respiratory distress.      Breath sounds: No wheezing, rhonchi or rales.   Abdominal:      General: Bowel sounds are normal.      Palpations: Abdomen is soft.      Tenderness: There is no abdominal tenderness.      Hernia: No hernia is present.   Musculoskeletal:      Cervical back: Normal range of motion and neck supple.   Lymphadenopathy:      Cervical: No cervical adenopathy.   Skin:      General: Skin is warm and dry.   Neurological:      Mental Status: She is alert.   Psychiatric:         Behavior: Behavior normal.         Thought Content: Thought content normal.          Assessment and Plan (including Health Maintenance)   :    Plan:         Health Maintenance Due   Topic Date Due    Hepatitis C Screening  Never done    Lipid Panel  Never done    COVID-19 Vaccine (1) Never done    TETANUS VACCINE  Never done    Colorectal Cancer Screening  Never done    Shingles Vaccine (1 of 2) Never done    Pneumococcal Vaccines (Age 65+) (1 - PCV) Never done    DEXA Scan  06/14/2021       Problem List Items Addressed This Visit        ENT    Tinnitus of both ears       Orthopedic    Trapezius muscle spasm       Other    Frequent falls    Relevant Orders    Ambulatory referral/consult to Physical/Occupational Therapy    Weakness      Other Visit Diagnoses     Dysuria    -  Primary    Relevant Orders    POCT URINALYSIS W/O SCOPE (Completed)    Acute recurrent maxillary sinusitis            Dysuria  -     POCT URINALYSIS W/O SCOPE    Frequent falls  -     Ambulatory referral/consult to Physical/Occupational Therapy; Future; Expected date: 05/24/2022    Weakness    Acute recurrent maxillary sinusitis    Trapezius muscle spasm    Tinnitus of both ears    Other orders  -     amoxicillin (AMOXIL) 500 MG Tab; Take 1 tablet (500 mg total) by mouth every 12 (twelve) hours. for 10 days  Dispense: 20 tablet; Refill: 0  -     nitrofurantoin, macrocrystal-monohydrate, (MACROBID) 100 MG capsule; Take 1 capsule (100 mg total) by mouth once daily at 6am.  Dispense: 30 capsule; Refill: 6       Health Maintenance Topics with due status: Not Due       Topic Last Completion Date    Mammogram 02/23/2022       Procedures     Future Appointments   Date Time Provider Department Center   6/8/2022 10:30 AM JAMES Pandya [facility] ROHAN [place]   6/8/2022  1:00 PM Sue Carmona PT [facility] [place]   [facility]29[facility]2023 11:00  [facility]        No follow-ups on file.       Signature:  Tim Burgos MD  [facility]  [address] [facility]   [place] [address]  [phone] Phone  [phone] Fax    Date of encounter: 5/17/22

## 2022-05-31 ENCOUNTER — PATIENT OUTREACH (OUTPATIENT)
Dept: PRIMARY CARE CLINIC | Facility: CLINIC | Age: 67
End: 2022-05-31
Payer: MEDICARE

## 2022-05-31 LAB — CRC RECOMMENDATION EXT: NORMAL

## 2022-06-08 ENCOUNTER — CLINICAL SUPPORT (OUTPATIENT)
Dept: REHABILITATION | Facility: HOSPITAL | Age: 67
End: 2022-06-08
Attending: FAMILY MEDICINE
Payer: MEDICARE

## 2022-06-08 ENCOUNTER — OFFICE VISIT (OUTPATIENT)
Dept: FAMILY MEDICINE | Facility: CLINIC | Age: 67
End: 2022-06-08
Payer: MEDICARE

## 2022-06-08 VITALS
HEART RATE: 87 BPM | OXYGEN SATURATION: 95 % | HEIGHT: 67 IN | DIASTOLIC BLOOD PRESSURE: 76 MMHG | TEMPERATURE: 97 F | BODY MASS INDEX: 32.96 KG/M2 | RESPIRATION RATE: 20 BRPM | SYSTOLIC BLOOD PRESSURE: 124 MMHG | WEIGHT: 210 LBS

## 2022-06-08 DIAGNOSIS — Z88.8 ALLERGY TO STATIN MEDICATION: ICD-10-CM

## 2022-06-08 DIAGNOSIS — R53.83 FATIGUE, UNSPECIFIED TYPE: ICD-10-CM

## 2022-06-08 DIAGNOSIS — R29.6 FREQUENT FALLS: ICD-10-CM

## 2022-06-08 DIAGNOSIS — I10 ESSENTIAL (PRIMARY) HYPERTENSION: ICD-10-CM

## 2022-06-08 DIAGNOSIS — E66.9 OBESITY (BMI 30-39.9): ICD-10-CM

## 2022-06-08 DIAGNOSIS — I50.9 CONGESTIVE HEART FAILURE, UNSPECIFIED HF CHRONICITY, UNSPECIFIED HEART FAILURE TYPE: ICD-10-CM

## 2022-06-08 DIAGNOSIS — I10 HYPERTENSION, UNSPECIFIED TYPE: ICD-10-CM

## 2022-06-08 DIAGNOSIS — Z13.220 SCREENING FOR HYPERLIPIDEMIA: ICD-10-CM

## 2022-06-08 DIAGNOSIS — Z00.00 ENCOUNTER FOR SUBSEQUENT ANNUAL WELLNESS VISIT (AWV) IN MEDICARE PATIENT: Primary | ICD-10-CM

## 2022-06-08 DIAGNOSIS — Z00.00 ENCOUNTER FOR PREVENTIVE HEALTH EXAMINATION: ICD-10-CM

## 2022-06-08 DIAGNOSIS — R53.1 WEAKNESS: ICD-10-CM

## 2022-06-08 DIAGNOSIS — R45.89 DEPRESSED MOOD: ICD-10-CM

## 2022-06-08 DIAGNOSIS — Z23 NEED FOR SHINGLES VACCINE: ICD-10-CM

## 2022-06-08 DIAGNOSIS — Z12.11 ENCOUNTER FOR SCREENING COLONOSCOPY: ICD-10-CM

## 2022-06-08 DIAGNOSIS — E03.9 HYPOTHYROIDISM, UNSPECIFIED TYPE: ICD-10-CM

## 2022-06-08 DIAGNOSIS — Z11.59 SCREENING FOR VIRAL DISEASE: ICD-10-CM

## 2022-06-08 PROCEDURE — G0439 PR MEDICARE ANNUAL WELLNESS SUBSEQUENT VISIT: ICD-10-PCS | Mod: ,,, | Performed by: NURSE PRACTITIONER

## 2022-06-08 PROCEDURE — 97161 PT EVAL LOW COMPLEX 20 MIN: CPT

## 2022-06-08 PROCEDURE — 97110 THERAPEUTIC EXERCISES: CPT

## 2022-06-08 PROCEDURE — G0439 PPPS, SUBSEQ VISIT: HCPCS | Mod: ,,, | Performed by: NURSE PRACTITIONER

## 2022-06-08 RX ORDER — ZOSTER VACCINE RECOMBINANT, ADJUVANTED 50 MCG/0.5
0.5 KIT INTRAMUSCULAR ONCE
Qty: 1 EACH | Refills: 1 | Status: SHIPPED | OUTPATIENT
Start: 2022-06-08 | End: 2022-06-08

## 2022-06-08 RX ORDER — FLUTICASONE PROPIONATE 50 MCG
SPRAY, SUSPENSION (ML) NASAL DAILY
COMMUNITY
End: 2022-11-28

## 2022-06-08 NOTE — PATIENT INSTRUCTIONS
Counseling and Referral of Other Preventative  (Italic type indicates deductible and co-insurance are waived)    Patient Name: Victoria Delarosa  Today's Date: 6/8/2022    Health Maintenance       Date Due Completion Date    Hepatitis C Screening Never done ---    Lipid Panel Never done ---    COVID-19 Vaccine (4 - Booster for Moderna series) 04/30/2022 12/31/2021    Colorectal Cancer Screening 07/16/2022 7/16/2019    DEXA Scan 06/08/2022 (Originally 6/14/2021) 6/14/2019    TETANUS VACCINE 06/08/2023 (Originally 12/24/1971) ---    Shingles Vaccine (1 of 2) 06/08/2023 (Originally 12/24/2003) ---    Pneumococcal Vaccines (Age 65+) (1 - PCV) 06/08/2023 (Originally 12/24/2018) ---    Mammogram 02/23/2023 2/23/2022        No orders of the defined types were placed in this encounter.    The following information is provided to all patients.  This information is to help you find resources for any of the problems found today that may be affecting your health:                Living healthy guide: www.[facility].[place].[facility]      Understanding Diabetes: www.diabetes.org      Eating healthy: www.cdc.gov/healthyweight      CDC home safety checklist: www.cdc.gov/steadi/patient.html      Agency on Aging: www.goea.[place].[facility]      Alcoholics anonymous (AA): www.aa.org      Physical Activity: www.shannon.nih.gov/qz6tyuh      Tobacco use: www.quitwithusla.org     Counseling and Referral of Other Preventative  (Italic type indicates deductible and co-insurance are waived)    Patient Name: Victoria Delarosa  Today's Date: 6/8/2022    Health Maintenance       Date Due Completion Date    Hepatitis C Screening Never done ---    Lipid Panel Never done ---    COVID-19 Vaccine (4 - Booster for Moderna series) 04/30/2022 12/31/2021    Colorectal Cancer Screening 07/16/2022 7/16/2019    DEXA Scan 06/08/2022 (Originally 6/14/2021) 6/14/2019    TETANUS VACCINE 06/08/2023 (Originally 12/24/1971) ---    Shingles Vaccine (1 of 2) 06/08/2023 (Originally 12/24/2003)  ---    Pneumococcal Vaccines (Age 65+) (1 - PCV) 06/08/2023 (Originally 12/24/2018) ---    Mammogram 02/23/2023 2/23/2022        No orders of the defined types were placed in this encounter.    The following information is provided to all patients.  This information is to help you find resources for any of the problems found today that may be affecting your health:                Living healthy guide: www.[facility].[place].[facility]      Understanding Diabetes: www.diabetes.org      Eating healthy: www.cdc.gov/healthyweight      CDC home safety checklist: www.cdc.gov/steadi/patient.html      Agency on Aging: www.goea.[place].[facility]      Alcoholics anonymous (AA): www.aa.org      Physical Activity: www.shannon.nih.gov/ki6waky      Tobacco use: www.quitwithusla.org

## 2022-06-08 NOTE — PLAN OF CARE
[facility]  Physical Therapy Initial Evaluation    Name: Victoria Delarosa  Clinic Number: [id number]    Therapy Diagnosis:   Encounter Diagnosis   Name Primary?    Frequent falls      Physician: Tim Burgos MD    Physician Orders: PT Eval and Treat  Medical Diagnosis from Referral: B LE weakness and frequent falls  Evaluation Date: 6/8/2022  Authorization Period Expiration: 6/8/2023  Plan of Care Expiration: 7/6/2022  Visit # / Visits authorized: 1/8    Time In: 13:05  Time Out: 13:55  Total Appointment Time (timed & untimed codes): 50 minutes    Precautions: Standard    Subjective   Date of onset: unable to date   History of current condition - Ana Paula reports: increasing weakness and increasing frequency of falls that has begun to limit her safety and independence with functional mobility and ADLs.      Medical History:   Past Medical History:   Diagnosis Date    Anxiety state     BMI 32.0-32.9,adult     Bronchitis     CHF (congestive heart failure)     Cobalamin deficiency     Deep vein thrombosis     Depressive disorder     GERD (gastroesophageal reflux disease)     Hypertension     Hypothyroidism     Lower extremity edema     Neuropathy     Osteoarthritis of both knees     Pain in left leg     Pain in right leg     Peripheral edema     Pulmonary embolism     Varicose veins of bilateral lower extremities with pain     Vitamin D deficiency        Surgical History:   Victoria Delarosa  has a past surgical history that includes Cardiac catheterization; Colonoscopy; knee scope (Left); Total knee arthroplasty (Right); TONSILLECTOMY, ADENOIDECTOMY; Thyroidectomy, partial; Total knee arthroplasty (Right); Bilateral tubal ligation; Vaginal delivery; and Tubal ligation.    Medications:   Victoria has a current medication list which includes the following prescription(s): cyanocobalamin, ezetimibe, fluticasone propionate, furosemide, levothyroxine, losartan, magnesium oxide, nitrofurantoin  "(macrocrystal-monohydrate), omeprazole, oxybutynin, potassium chloride, potassium chloride sa, propylene glycol/peg 400, ranolazine, sertraline, spironolactone, and warfarin.    Allergies:   Review of patient's allergies indicates:   Allergen Reactions    Lisinopril      Cough      Statins-hmg-coa reductase inhibitors Hives and Itching        Imaging, none:     Prior Therapy: None   Social History:  lives with their family  Occupation: Retired   Prior Level of Function: Independent   Current Level of Function: Modified independent with SC     Pain:  Patient reports lower back pain; however she was not referred to PT for pain.     Pts goals: "Get some strength back and be able to use my legs better."     Objective     Range of motion:  Motion Right Left    Hip flexion  WITHIN FUNCTIONAL LIMITS  WITHIN FUNCTIONAL LIMITS   Hip extension  WITHIN FUNCTIONAL LIMITS  WITHIN FUNCTIONAL LIMITS   Hip abduction  WITHIN FUNCTIONAL LIMITS  WITHIN FUNCTIONAL LIMITS   Hip adduction  WITHIN FUNCTIONAL LIMITS  WITHIN FUNCTIONAL LIMITS   Knee extension  WITHIN FUNCTIONAL LIMITS  WITHIN FUNCTIONAL LIMITS   Knee flexion  WITHIN FUNCTIONAL LIMITS  WITHIN FUNCTIONAL LIMITS   Ankle DF  WITHIN FUNCTIONAL LIMITS  WITHIN FUNCTIONAL LIMITS   Ankle PF  WITHIN FUNCTIONAL LIMITS  WITHIN FUNCTIONAL LIMITS   Ankle Inversion  WITHIN FUNCTIONAL LIMITS  WITHIN FUNCTIONAL LIMITS   Ankle Eversion  WITHIN FUNCTIONAL LIMITS  WITHIN FUNCTIONAL LIMITS       Manual muscle test   Muscle Right  Left    Hip flexion  MMT strength: 3/5  MMT strength: 3+/5   Hip extension  MMT strength: 3/5  MMT strength: 3/5   Hip abduction  MMT strength: 3+/5  MMT strength: 3+/5   Hip adduction  MMT strength: 3+/5  MMT strength: 3+/5   Knee extension  MMT strength: 4-/5  MMT strength: 4-/5   Knee flexion  MMT strength: 3+/5  MMT strength: 4-/5   Ankle DF  MMT strength: 4/5  MMT strength: 4/5   Ankle PF  MMT strength: 4/5  MMT strength: 4/5   Ankle inversion  MMT strength: " "4/5  MMT strength: 4/5   Ankle eversion  MMT strength: 4/5  MMT strength: 4/5       Gait:  Weight bearing precautions: WBAT  Assistive device: straight cane  Ambulation deviations: decreased step lengths and jono   Gait speed: 1.22 feet per second with SC     Clinical Special Tests:  Patient is unable to transfer sit to stand with no UE use.     TREATMENT   Treatment Time In: 13:16  Treatment Time Out: 13:55  Total Treatment time (time-based codes) separate from Evaluation: 39 minutes    Ana Paula received the treatments listed below:  THERAPEUTIC EXERCISES to develop strength, endurance and flexibility for 39 minutes including :See flowsheet below      Ther-Ex Reps        NuStep  8 minutes    LAQs  2 x 10 3" each    HS Curls  2 x 10 Red TB each    Hip ABD  2 x 10 red TB    Hip ADD  3 x 10 3"    Clam Shells  2 x 10 Red TB each                  Home Exercises and Patient Education Provided        Education provided: Use of AD for safety with ambulation.     Written Home Exercises Provided: yes.  Exercises were reviewed and Ana Paula was able to demonstrate them prior to the end of the session.  Ana Paula demonstrated good  understanding of the education provided.     See EMR under Patient Instructions for exercises provided 6/8/2022.    Assessment   Victoria is a 66 y.o. female referred to outpatient Physical Therapy with a medical diagnosis of B LE weakness and falls. Pt presents with B LE muscle weakness and decreased endurance resulting in decreased safety and independence with functional mobility.     Pt prognosis is fair.   Pt will benefit from skilled outpatient Physical Therapy to address the deficits stated above and in the chart below, provide pt/family education, and to maximize pt's level of independence.     PT provided patient with verbal, visual, and tactile cueing for LE alignment, form, and hold times with exercises. Patient demonstrates decreased B quad control with increasing fatigue. Patient with no reports of pain " only muscle fatigue. She required frequent rest breaks due to LE fatigue.     Plan of care discussed with patient: Yes  Pt's spiritual, cultural and educational needs considered and patient is agreeable to the plan of care and goals as stated below:     Anticipated Barriers for therapy: chronicity of weakness, compliance with HEP and treatment, sedentary lifestyle     Goals:  1. Patient will ambulate with gait speed of greater than or equal to 2 feet per second to increase safety with community ambulation.   2. Patient will complete sit to stand transfer with no UE use to demonstrate increased LE power.   3. Patient will have increased R LE muscle strength to greater than or equal to 4-/5for increase safety and independence with functional transfers.  4. Patient will have increased L LE muscle strength to greater than or equal to 4-/5 for increase safety and independence with functional transfers.     Plan   Plan of care Certification: 6/8/2022 to 7/6/2022.    Outpatient Physical Therapy 2 times weekly for 4 weeks to include the following interventions: Gait Training, Neuromuscular Re-ed, Patient Education, Therapeutic Activities and Therapeutic Exercise.     Calvin Hartley, PT, DPT

## 2022-06-08 NOTE — PROGRESS NOTES
[facility]     PATIENT NAME: Victoria Delarosa   : 1955    AGE: 66 y.o. DATE: 2022   MRN: [id number]        Reason for Visit / Chief Complaint: Medicare AWV Follow Up (Medicare Subsequent/)        Victoria Delarosa presents for a subsequent Medicare AWV today.     The following components were reviewed and updated:    Medical/Social/Family History:  Past Medical History:   Diagnosis Date    Anxiety state     BMI 32.0-32.9,adult     Bronchitis     CHF (congestive heart failure)     Cobalamin deficiency     Deep vein thrombosis     Depressive disorder     GERD (gastroesophageal reflux disease)     Hypertension     Hypothyroidism     Lower extremity edema     Neuropathy     Osteoarthritis of both knees     Pain in left leg     Pain in right leg     Peripheral edema     Pulmonary embolism     Varicose veins of bilateral lower extremities with pain     Vitamin D deficiency         Family History   Problem Relation Age of Onset    Arthritis Sister     Arthritis Brother     Heart disease Brother     Stroke Brother         Social History     Tobacco Use   Smoking Status Never Smoker   Smokeless Tobacco Never Used      Social History     Substance and Sexual Activity   Alcohol Use Never       Family History   Problem Relation Age of Onset    Arthritis Sister     Arthritis Brother     Heart disease Brother     Stroke Brother        Past Surgical History:   Procedure Laterality Date    BILATERAL TUBAL LIGATION      CARDIAC CATHETERIZATION      COLONOSCOPY      knee scope Left     THYROIDECTOMY, PARTIAL      TONSILLECTOMY, ADENOIDECTOMY      TOTAL KNEE ARTHROPLASTY Right     TOTAL KNEE ARTHROPLASTY Right     TUBAL LIGATION      VAGINAL DELIVERY      x 3         · Allergies and Current Medications     Review of patient's allergies indicates:   Allergen Reactions    Lisinopril      Cough      Statins-hmg-coa reductase inhibitors Hives  and Itching       Current Outpatient Medications:     cyanocobalamin 1,000 mcg/mL injection, INJECT 1 ML EVERY MONTH, Disp: 1 mL, Rfl: 11    ezetimibe (ZETIA) 10 mg tablet, Take 10 mg by mouth once daily., Disp: , Rfl:     fluticasone propionate (ALLERGY RELIEF, FLUTICASONE,) 50 mcg/actuation nasal spray, by Each Nostril route once daily., Disp: , Rfl:     furosemide (LASIX) 20 MG tablet, TAKE 1 TABLET BY MOUTH EVERY DAY, Disp: 90 tablet, Rfl: 0    levothyroxine (SYNTHROID) 25 MCG tablet, TAKE ONE TABLET BY MOUTH EVERY DAY, Disp: 90 tablet, Rfl: 1    losartan (COZAAR) 50 MG tablet, TAKE ONE TABLET BY MOUTH EVERY DAY FOR BLOOD PRESSURE, Disp: 90 tablet, Rfl: 1    magnesium oxide (MAG-OX) 400 mg (241.3 mg magnesium) tablet, Take 1 tablet by mouth once daily., Disp: , Rfl:     nitrofurantoin, macrocrystal-monohydrate, (MACROBID) 100 MG capsule, Take 1 capsule (100 mg total) by mouth once daily at 6am., Disp: 30 capsule, Rfl: 6    omeprazole (PRILOSEC) 20 MG capsule, TAKE ONE CAPSULE BY MOUTH DAILY, Disp: 90 capsule, Rfl: 1    oxybutynin (DITROPAN) 5 MG Tab, Take 1 tablet by mouth 2 (two) times daily., Disp: , Rfl:     potassium chloride (KLOR-CON) 10 MEQ TbSR, Take 1 tablet (10 mEq total) by mouth once daily., Disp: 90 tablet, Rfl: 1    propylene glycol/peg 400 (SYSTANE ULTRA OPHT), Apply 1 drop to eye as needed., Disp: , Rfl:     ranolazine (RANEXA) 500 MG Tb12, Take 500 mg by mouth 2 (two) times daily., Disp: , Rfl:     sertraline (ZOLOFT) 100 MG tablet, TAKE ONE TABLET BY MOUTH DAILY, Disp: 90 tablet, Rfl: 1    spironolactone (ALDACTONE) 25 MG tablet, TAKE ONE TABLET BY MOUTH EVERY DAY, Disp: 90 tablet, Rfl: 1    warfarin (COUMADIN) 5 MG tablet, Take 5-7.5 mg by mouth. Take 5mg daily except on Thursday. Thursday take 7.5mg., Disp: , Rfl:     potassium chloride SA (K-DUR,KLOR-CON) 10 MEQ tablet, TAKE ONE TABLET BY MOUTH EVERY DAY (Patient not taking: Reported on 6/8/2022), Disp: 90 tablet, Rfl: 1     varicella-zoster gE-AS01B, PF, (SHINGRIX, PF,) 50 mcg/0.5 mL injection, Inject 0.5 mLs into the muscle once. for 1 dose, Disp: 1 each, Rfl: 1    · Health Risk Assessment   Fall Risk: yes   Obesity: BMI 32.89     Advance Directive:  Does not have an advanced directive. Verbal education and written education included in today's AVS.   Depression: PHQ9 = 10     HTN: yes   T2DM: no    Tobacco use: non user  STI: n/a    Alcohol misuse: non user   Statin Use: no - Allergic    · Health Maintenance   Last eye exam: last week - Dr. Hayward   Last CV screen with lipids: ordered for next visit    Diabetes screening with fasting glucose or A1c: 10/27/21   Colonoscopy: ordered   Flu Vaccine: 12/31/21   Pneumonia vaccines: had 11/2/06, but declined today   COVID vaccine: 3/16/21; 4/15/21; 12/31/21   DEXA: declines at present -- had 6/14/19   Last pap/pelvic: n/a > 65   Last Mammogram: 2/23/22   Hepatitis C Screen: ordered    Health Maintenance Topics with due status: Not Due       Topic Last Completion Date    Mammogram 02/23/2022     Health Maintenance Due   Topic Date Due    Hepatitis C Screening  Never done    Lipid Panel  Never done    COVID-19 Vaccine (4 - Booster for Moderna series) 04/30/2022    Colorectal Cancer Screening  07/16/2022       Counseling and Referral of Other Preventative  (Italic type indicates deductible and co-insurance are waived)    Patient Name: Victoria Delarosa  Today's Date: 6/8/2022    Health Maintenance       Date Due Completion Date    Hepatitis C Screening Never done ---    Lipid Panel Never done ---    COVID-19 Vaccine (4 - Booster for Moderna series) 04/30/2022 12/31/2021    Colorectal Cancer Screening 07/16/2022 7/16/2019    DEXA Scan 06/08/2022 (Originally 6/14/2021) 6/14/2019    TETANUS VACCINE 06/08/2023 (Originally 12/24/1971) ---    Shingles Vaccine (1 of 2) 06/08/2023 (Originally 12/24/2003) ---    Pneumococcal Vaccines (Age 65+) (1 - PCV) 06/08/2023 (Originally 12/24/2018) ---     Mammogram 02/23/2023 2/23/2022      Declines tdap, pneumonia vaccine, and dexa.     Incontinence  Bowel: no  Bladder: no        Sodium   Date Value Ref Range Status   10/27/2021 137 136 - 145 mmol/L Final     Potassium   Date Value Ref Range Status   10/27/2021 4.4 3.5 - 5.1 mmol/L Final     Chloride   Date Value Ref Range Status   10/27/2021 105 98 - 107 mmol/L Final     CO2   Date Value Ref Range Status   10/27/2021 28 21 - 32 mmol/L Final     Glucose   Date Value Ref Range Status   10/27/2021 107 (H) 74 - 106 mg/dL Final     BUN   Date Value Ref Range Status   10/27/2021 15 7 - 18 mg/dL Final     Creatinine   Date Value Ref Range Status   10/27/2021 1.19 (H) 0.55 - 1.02 mg/dL Final     Calcium   Date Value Ref Range Status   10/27/2021 9.2 8.5 - 10.1 mg/dL Final     Total Protein   Date Value Ref Range Status   05/13/2021 6.6 6.4 - 8.2 g/dL Final     Albumin   Date Value Ref Range Status   05/13/2021 3.7 3.5 - 5.0 g/dL Final     Bilirubin, Total   Date Value Ref Range Status   05/13/2021 0.7 >0.0 - 1.2 mg/dL Final     Alk Phos   Date Value Ref Range Status   05/13/2021 100 55 - 142 U/L Final     AST   Date Value Ref Range Status   05/13/2021 30 15 - 37 U/L Final     ALT   Date Value Ref Range Status   05/13/2021 27 13 - 56 U/L Final     Anion Gap   Date Value Ref Range Status   10/27/2021 8 7 - 16 mmol/L Final     eGFR   Date Value Ref Range Status   10/27/2021 48 (L) >=60 mL/min/1.73m² Final           · Care Team   PCP: Dr. Burgos    [facility] specialist: Dr. [facility] @ [facility]   GYN: Dr. Candelario   Card: [facility]   Physical Therapy -- Starts today at [facility]       **See Completed Assessments for Annual Wellness visit within the encounter summary    The following assessments were completed & reviewed:  · Depression Screening  · Cognitive function Screening  · Timed Get Up Test  · Whisper Test  · Vision Screen  · Health Risk Assessment  · Checklist of ADLs and  "IADLs      Objective  /76 (BP Location: Left arm, Patient Position: Sitting, BP Method: Large (Automatic))   Pulse 87   Temp 97.2 °F (36.2 °C) (Oral)   Resp 20   Ht 5' 7" (1.702 m)   Wt 95.3 kg (210 lb)   SpO2 95%   BMI 32.89 kg/m²        Physical Exam  Constitutional:       Appearance: Normal appearance.   HENT:      Head: Normocephalic.      Nose: Nose normal.      Mouth/Throat:      Mouth: Mucous membranes are moist.   Cardiovascular:      Rate and Rhythm: Normal rate and regular rhythm.      Heart sounds: Normal heart sounds. No murmur heard.    No gallop.   Pulmonary:      Effort: Pulmonary effort is normal. No respiratory distress.      Breath sounds: Normal breath sounds. No wheezing, rhonchi or rales.   Musculoskeletal:         General: Normal range of motion.   Skin:     General: Skin is warm and dry.   Neurological:      Mental Status: She is alert and oriented to person, place, and time.      Gait: Gait abnormal.      Comments: Uses cane for ambulation   Psychiatric:         Mood and Affect: Mood normal.         Behavior: Behavior normal.         Thought Content: Thought content normal.         Judgment: Judgment normal.           Assessment:     1. Encounter for subsequent annual wellness visit (AWV) in Medicare patient    2. Hypothyroidism, unspecified type    3. Congestive heart failure, unspecified HF chronicity, unspecified heart failure type    4. Encounter for screening colonoscopy  - Ambulatory referral/consult to Gastroenterology; Future    5. Hypertension, unspecified type    6. Screening for viral disease  - Hepatitis C Antibody; Future    7. Screening for hyperlipidemia  - Lipid Panel; Future    8. Encounter for preventive health examination    9. Fatigue, unspecified type    10. Weakness    11. Obesity (BMI 30-39.9)    12. Frequent falls    13. Depressed mood    14. Essential (primary) hypertension   - Lipid Panel; Future    15. Allergy to statin medication    16. Need for " shingles vaccine  - varicella-zoster gE-AS01B, PF, (SHINGRIX, PF,) 50 mcg/0.5 mL injection; Inject 0.5 mLs into the muscle once. for 1 dose  Dispense: 1 each; Refill: 1         Plan:    Orders Placed This Encounter   Procedures    Lipid Panel    Hepatitis C Antibody    Ambulatory referral/consult to Gastroenterology   Shingrix vaccine ordered - pt. Will get this and 4th covid booster at [facility]     Discussed depression -- pt. Denies suicide ideation. Currently takes Zoloft. States most of her problem is worry and anxiety over her kids, grandkids, and great grandchildren. Discussed going to [facility] but she declines at present. Asked her to talk with Dr. Burgos at next visit about possibly changing or adding something to Zoloft and she said she would.       Discussed and provided with a screening schedule and personal prevention plan in accordance with USPSTF age appropriate recommendations and Medicare screening guidelines.   Education, counseling, and referrals were provided as needed.  After Visit Summary printed and given to patient which includes written education and a list of any referrals if indicated.     F/u plan for yearly AWV.    Signature: COCO Mcguire    I offered to discuss advanced care planning, including how to pick a person who would make decisions for you if you were unable to make them for yourself, called a health care power of , and what kind of decisions you might make such as use of life sustaining treatments such as ventilators and tube feeding when faced with a life limiting illness recorded on a living will that they will need to know. (How you want to be cared for as you near the end of your natural life)     X Patient is interested in learning more about how to make advanced directives.  I provided them paperwork and offered to discuss this with them.

## 2022-06-10 ENCOUNTER — CLINICAL SUPPORT (OUTPATIENT)
Dept: REHABILITATION | Facility: HOSPITAL | Age: 67
End: 2022-06-10
Payer: MEDICARE

## 2022-06-10 PROCEDURE — 97110 THERAPEUTIC EXERCISES: CPT | Mod: CQ

## 2022-06-10 NOTE — PROGRESS NOTES
"[facility]   Physical Therapy Treatment Note     Name: Victoria Delarosa  Clinic Number: [id number]    Therapy Diagnosis:        Encounter Diagnosis   Name Primary?    Frequent falls        Physician: Tim Burgos MD     Physician Orders: PT Eval and Treat  Medical Diagnosis from Referral: B LE weakness and frequent falls  Evaluation Date: 6/8/2022  Authorization Period Expiration: 6/8/2023  Plan of Care Expiration: 7/6/2022  Visit # / Visits authorized: 2/8     Time In:  13:12  Time Out: 14:05  Total Appointment Time (timed & untimed codes): 53 minutes      Precautions: Standard    Visit Date: 6/10/2022      PTA Visit #: 1/5         SUBJECTIVE     Pt reports:  Weakness and pain in bilateral LE's and low back.  Patient ambulated into rehab department without AD, and states "I forgot my cane in the car".     She was partially  compliant with home exercise program.  Response to previous treatment: No adverse effects   Functional change: None reported     Pain: 4/10  Location: Low Back; Bilateral LE's     OBJECTIVE     Objective Measures updated at progress report unless specified.     Treatment     Ana Paula received the treatments listed below:      THERAPEUTIC EXERCISES to develop strength, endurance and flexibility.  See flowsheet below:      Ther-Ex Reps          NuStep  10 minutes    LAQs  2 x 10 3" each    Wedge  2 minutes *    Seated Hamstring stretch  3 x 30" each *    HS Curls  2 x 10 Red TB each    Hip ABD  3 x 10 red TB *   Hip ADD  3 x 10 3"    Clam Shells  2 x 10 Red TB each    PPT's  20 x 3" *    Quad sets  30 x 5" *    Supine SLR's 10 x supine    Glut sets  30 x 5" *    Supine Heelslides  2 x 10 *        Patient Education and Home Exercises     Home Exercises Provided and Patient Education Provided     Education provided: POC, HEP, DOMS       Written Home Exercises Provided: Patient instructed to cont prior HEP. Exercises were reviewed and Ana Paula was able to demonstrate them " prior to the end of the session.  Ana Paula demonstrated fair  understanding of the education provided. See EMR under Patient Instructions for exercises provided during therapy sessions    ASSESSMENT     Victoria is a 66 y.o. female referred to outpatient Physical Therapy with a medical diagnosis of B LE weakness and falls. Pt presents with B LE muscle weakness and decreased endurance resulting in decreased safety and independence with functional mobility. Added Ther Ex and increased reps with previous Ther Ex as tolerated by patient.  Patient required mod cues and increased time and effort to complete through completion of Ther Ex with proper alignment, motor control, count, and hold times.  Patient requires multiple rest breaks during treatment session secondary to reports of fatigue and weakness.  No adverse effects noted.     Ana Paula is  progressing well towards her goals.   Pt prognosis is Fair.     Pt will continue to benefit from skilled outpatient physical therapy to address the deficits listed in the problem list box on initial evaluation, provide pt/family education and to maximize pt's level of independence in the home and community environment.     Pt's spiritual, cultural and educational needs considered and pt agreeable to plan of care and goals.     Anticipated barriers to physical therapy: chronicity of weakness, compliance with HEP and treatment, sedentary lifestyle     Goals:   . Patient will ambulate with gait speed of greater than or equal to 2 feet per second to increase safety with community ambulation.   2. Patient will complete sit to stand transfer with no UE use to demonstrate increased LE power.   3. Patient will have increased R LE muscle strength to greater than or equal to 4-/5for increase safety and independence with functional transfers.  4. Patient will have increased L LE muscle strength to greater than or equal to 4-/5 for increase safety and independence with functional transfers.     PLAN      Plan of care Certification: 6/8/2022 to 7/6/2022.  Outpatient Physical Therapy 2 times weekly for 4 weeks to include the following interventions: Gait Training, Neuromuscular Re-ed, Patient Education, Therapeutic Activities and Therapeutic Exercise.     Continue POC Per PT order to progress patient toward rehab goals as tolerated by patient.   Darling Soliman, PTA 6/10/2022

## 2022-06-14 ENCOUNTER — CLINICAL SUPPORT (OUTPATIENT)
Dept: REHABILITATION | Facility: HOSPITAL | Age: 67
End: 2022-06-14
Payer: MEDICARE

## 2022-06-14 PROCEDURE — 97110 THERAPEUTIC EXERCISES: CPT | Mod: CQ

## 2022-06-14 NOTE — PROGRESS NOTES
"[facility]   Physical Therapy Treatment Note     Name: Victoria Delarosa  [facility] Number: [id number]    Therapy Diagnosis:        Encounter Diagnosis   Name Primary?    Frequent falls        Physician: Tim Burgos MD     Physician Orders: PT Eval and Treat  Medical Diagnosis from Referral: B LE weakness and frequent falls  Evaluation Date: 6/8/2022  Authorization Period Expiration: 6/8/2023  Plan of Care Expiration: 7/6/2022  Visit # / Visits authorized: 3/8     Time In: 13:08  Time Out: 14:09  Total Appointment Time (timed & untimed codes): 61 minutes      Precautions: Standard    Visit Date: 6/14/2022      PTA Visit #: 2/5         SUBJECTIVE     Pt reports: Patient reported that she rode in the car a lot yesterday. Patient stated that she had pain in her bilateral knees and back today.  She was partially  compliant with home exercise program.  Response to previous treatment: No adverse effects   Functional change: None reported     Pain: 2/10, 3-4/10   Location: Bilateral Knees, back     OBJECTIVE     Objective Measures updated at progress report unless specified.     Treatment     Ana Paula received the treatments listed below:      THERAPEUTIC EXERCISES to develop strength, endurance and flexibility. See flowsheet below:      Ther-Ex Reps          NuStep  10 minutes    LAQs  2 x 10 3" each    Wedge  2 minutes    Seated Hamstring stretch  3 x 30" each    HS Curls  2 x 10 Red TB each    Hip ABD  3 x 10 red TB    Hip ADD  3 x 10 3"    Clam Shells  2 x 10 Red TB each    PPT's  20 x 3"    Quad sets  30 x 5"    Supine SLR's 10 x supine    Glut sets  30 x 5"    Supine Heelslides  2 x 10        Patient Education and Home Exercises     Home Exercises Provided and Patient Education Provided     Written Home Exercises Provided: Patient instructed to cont prior HEP. Exercises were reviewed and Ana Paula was able to demonstrate them prior to the end of the session.  Ana Paula demonstrated fair  understanding " of the education provided. See EMR under Patient Instructions for exercises provided during therapy sessions    ASSESSMENT     Victoria is a 66 y.o. female referred to outpatient Physical Therapy with a medical diagnosis of B LE weakness and falls. Continued with current treatment due to recent increases with therapeutic exercises. LPTA provided patient with visual and verbal cues for correct form and hold times of therapy tasks. Patient required occasional rest breaks due to reports of cramping in LLE. After completion of treatment session patient reported that she was tired.   Ana Paula is  progressing well towards her goals.   Pt prognosis is Fair.     Pt will continue to benefit from skilled outpatient physical therapy to address the deficits listed in the problem list box on initial evaluation, provide pt/family education and to maximize pt's level of independence in the home and community environment.     Pt's spiritual, cultural and educational needs considered and pt agreeable to plan of care and goals.     Anticipated barriers to physical therapy: chronicity of weakness, compliance with HEP and treatment, sedentary lifestyle     Goals:   . Patient will ambulate with gait speed of greater than or equal to 2 feet per second to increase safety with community ambulation.   2. Patient will complete sit to stand transfer with no UE use to demonstrate increased LE power.   3. Patient will have increased R LE muscle strength to greater than or equal to 4-/5for increase safety and independence with functional transfers.  4. Patient will have increased L LE muscle strength to greater than or equal to 4-/5 for increase safety and independence with functional transfers.     PLAN     Plan of care Certification: 6/8/2022 to 7/6/2022.  Outpatient Physical Therapy 2 times weekly for 4 weeks to include the following interventions: Gait Training, Neuromuscular Re-ed, Patient Education, Therapeutic Activities and Therapeutic Exercise.      Continue POC Per PT order to progress patient toward rehab goals as tolerated by patient.   Gloria Tijerina, PTA 6/14/2022

## 2022-06-15 ENCOUNTER — OFFICE VISIT (OUTPATIENT)
Dept: FAMILY MEDICINE | Facility: CLINIC | Age: 67
End: 2022-06-15
Payer: MEDICARE

## 2022-06-15 VITALS
TEMPERATURE: 98 F | SYSTOLIC BLOOD PRESSURE: 136 MMHG | BODY MASS INDEX: 32.96 KG/M2 | DIASTOLIC BLOOD PRESSURE: 94 MMHG | HEART RATE: 82 BPM | WEIGHT: 210 LBS | HEIGHT: 67 IN | OXYGEN SATURATION: 98 %

## 2022-06-15 DIAGNOSIS — H15.002 SCLERITIS OF LEFT EYE: ICD-10-CM

## 2022-06-15 DIAGNOSIS — J01.01 ACUTE RECURRENT MAXILLARY SINUSITIS: Primary | ICD-10-CM

## 2022-06-15 PROCEDURE — 96372 THER/PROPH/DIAG INJ SC/IM: CPT | Mod: ,,, | Performed by: FAMILY MEDICINE

## 2022-06-15 PROCEDURE — 99213 PR OFFICE/OUTPT VISIT, EST, LEVL III, 20-29 MIN: ICD-10-PCS | Mod: ,,, | Performed by: FAMILY MEDICINE

## 2022-06-15 PROCEDURE — 99213 OFFICE O/P EST LOW 20 MIN: CPT | Mod: ,,, | Performed by: FAMILY MEDICINE

## 2022-06-15 PROCEDURE — 96372 PR INJECTION,THERAP/PROPH/DIAG2ST, IM OR SUBCUT: ICD-10-PCS | Mod: ,,, | Performed by: FAMILY MEDICINE

## 2022-06-15 RX ORDER — METHYLPREDNISOLONE ACETATE 80 MG/ML
40 INJECTION, SUSPENSION INTRA-ARTICULAR; INTRALESIONAL; INTRAMUSCULAR; SOFT TISSUE
Status: COMPLETED | OUTPATIENT
Start: 2022-06-15 | End: 2022-06-15

## 2022-06-15 RX ORDER — DEXAMETHASONE SODIUM PHOSPHATE 4 MG/ML
4 INJECTION, SOLUTION INTRA-ARTICULAR; INTRALESIONAL; INTRAMUSCULAR; INTRAVENOUS; SOFT TISSUE
Status: COMPLETED | OUTPATIENT
Start: 2022-06-15 | End: 2022-06-15

## 2022-06-15 RX ORDER — CEFTRIAXONE 1 G/1
1 INJECTION, POWDER, FOR SOLUTION INTRAMUSCULAR; INTRAVENOUS
Status: COMPLETED | OUTPATIENT
Start: 2022-06-15 | End: 2022-06-15

## 2022-06-15 RX ORDER — TOBRAMYCIN AND DEXAMETHASONE 3; 1 MG/ML; MG/ML
1 SUSPENSION/ DROPS OPHTHALMIC
Qty: 5 ML | Refills: 0 | Status: SHIPPED | OUTPATIENT
Start: 2022-06-15 | End: 2022-11-28

## 2022-06-15 RX ORDER — CEFUROXIME AXETIL 250 MG/1
250 TABLET ORAL 2 TIMES DAILY
Qty: 20 TABLET | Refills: 0 | OUTPATIENT
Start: 2022-06-15 | End: 2022-08-12

## 2022-06-15 RX ADMIN — METHYLPREDNISOLONE ACETATE 40 MG: 80 INJECTION, SUSPENSION INTRA-ARTICULAR; INTRALESIONAL; INTRAMUSCULAR; SOFT TISSUE at 10:06

## 2022-06-15 RX ADMIN — DEXAMETHASONE SODIUM PHOSPHATE 4 MG: 4 INJECTION, SOLUTION INTRA-ARTICULAR; INTRALESIONAL; INTRAMUSCULAR; INTRAVENOUS; SOFT TISSUE at 10:06

## 2022-06-15 RX ADMIN — CEFTRIAXONE 1 G: 1 INJECTION, POWDER, FOR SOLUTION INTRAMUSCULAR; INTRAVENOUS at 10:06

## 2022-06-15 NOTE — PROGRESS NOTES
Tim Burgos MD   [facility]  [address] [place] [address]     PATIENT NAME: Victoria Delarosa  : 1955  DATE: 6/15/22  MRN: [id number]      Billing Provider: Tim Burgos MD  Level of Service: [place] OFFICE/OUTPT VISIT, EST, LEVL III, 20-29 MIN  Patient PCP Information     Provider PCP Type    Tim Burgos MD General          Reason for Visit / Chief Complaint: Eye Pain (Left eyes pain and redness x 2 days. Got worse yesterday.), Otalgia (Bilateral ear pain x 2 days. Left ear worse.), and Sinus Problem (Productive cough, headache and nasal congestion x 2 days)         History of Present Illness / Problem Focused Workflow     Victoria Delarosa presents to the clinic with Eye Pain (Left eyes pain and redness x 2 days. Got worse yesterday.), Otalgia (Bilateral ear pain x 2 days. Left ear worse.), and Sinus Problem (Productive cough, headache and nasal congestion x 2 days)     HPI    Review of Systems     Review of Systems   Constitutional: Negative for activity change, appetite change, fatigue and fever.   HENT: Positive for nasal congestion, ear pain, sinus pressure/congestion and sore throat. Negative for hearing loss.    Eyes: Positive for pain and redness.   Respiratory: Positive for cough. Negative for chest tightness and shortness of breath.    Cardiovascular: Negative for chest pain and palpitations.   Gastrointestinal: Negative for abdominal pain and fecal incontinence.   Genitourinary: Negative for bladder incontinence and difficulty urinating.   Musculoskeletal: Negative for arthralgias.   Integumentary:  Negative for rash.   Neurological: Negative for dizziness and headaches.        Medical / Social / Family History     Past Medical History:   Diagnosis Date    Anxiety state     BMI 32.0-32.9,adult     Bronchitis     CHF (congestive heart failure)     Cobalamin deficiency     Deep vein thrombosis     Depressive disorder     GERD (gastroesophageal  reflux disease)     Hypertension     Hypothyroidism     Lower extremity edema     Neuropathy     Osteoarthritis of both knees     Pain in left leg     Pain in right leg     Peripheral edema     Pulmonary embolism     Varicose veins of bilateral lower extremities with pain     Vitamin D deficiency        Past Surgical History:   Procedure Laterality Date    BILATERAL TUBAL LIGATION      CARDIAC CATHETERIZATION      COLONOSCOPY      knee scope Left     THYROIDECTOMY, PARTIAL      TONSILLECTOMY, ADENOIDECTOMY      TOTAL KNEE ARTHROPLASTY Right     TOTAL KNEE ARTHROPLASTY Right     TUBAL LIGATION      VAGINAL DELIVERY      x 3       Social History  Victoria Delarosa  reports that she has never smoked. She has never used smokeless tobacco. She reports that she does not drink alcohol and does not use drugs.    Family History  Victoria Delarosa  family history includes Arthritis in her brother and sister; Heart disease in her brother; Stroke in her brother.    Medications and Allergies     Medications  Outpatient Medications Marked as Taking for the 6/15/22 encounter (Office Visit) with Tim Burgos MD   Medication Sig Dispense Refill    cyanocobalamin 1,000 mcg/mL injection INJECT 1 ML EVERY MONTH 1 mL 11    ezetimibe (ZETIA) 10 mg tablet Take 10 mg by mouth once daily.      fluticasone propionate (FLONASE) 50 mcg/actuation nasal spray by Each Nostril route once daily.      furosemide (LASIX) 20 MG tablet TAKE 1 TABLET BY MOUTH EVERY DAY 90 tablet 0    levothyroxine (SYNTHROID) 25 MCG tablet TAKE ONE TABLET BY MOUTH EVERY DAY 90 tablet 1    losartan (COZAAR) 50 MG tablet TAKE ONE TABLET BY MOUTH EVERY DAY FOR BLOOD PRESSURE 90 tablet 1    magnesium oxide (MAG-OX) 400 mg (241.3 mg magnesium) tablet Take 1 tablet by mouth once daily.      nitrofurantoin, macrocrystal-monohydrate, (MACROBID) 100 MG capsule Take 1 capsule (100 mg total) by mouth once daily at 6am. 30 capsule 6    omeprazole  (PRILOSEC) 20 MG capsule TAKE ONE CAPSULE BY MOUTH DAILY 90 capsule 1    oxybutynin (DITROPAN) 5 MG Tab Take 1 tablet by mouth 2 (two) times daily.      potassium chloride (KLOR-CON) 10 MEQ TbSR Take 1 tablet (10 mEq total) by mouth once daily. 90 tablet 1    potassium chloride SA (K-DUR,KLOR-CON) 10 MEQ tablet TAKE ONE TABLET BY MOUTH EVERY DAY 90 tablet 1    propylene glycol/peg 400 (SYSTANE ULTRA OPHT) Apply 1 drop to eye as needed.      ranolazine (RANEXA) 500 MG Tb12 Take 500 mg by mouth 2 (two) times daily.      sertraline (ZOLOFT) 100 MG tablet TAKE ONE TABLET BY MOUTH DAILY 90 tablet 1    warfarin (COUMADIN) 5 MG tablet Take 5-7.5 mg by mouth. Take 5mg daily except on Thursday. Thursday take 7.5mg.      [DISCONTINUED] spironolactone (ALDACTONE) 25 MG tablet TAKE ONE TABLET BY MOUTH EVERY DAY 90 tablet 1       Allergies  Review of patient's allergies indicates:   Allergen Reactions    Lisinopril      Cough      Statins-hmg-coa reductase inhibitors Hives and Itching       Physical Examination     Vitals:    06/15/22 0945   BP: (!) 136/94   Pulse: 82   Temp: 97.7 °F (36.5 °C)     Physical Exam  Constitutional:       General: She is not in acute distress.     Appearance: She is not ill-appearing.   HENT:      Head: Normocephalic and atraumatic.      Right Ear: Tympanic membrane and ear canal normal.      Left Ear: Tympanic membrane and ear canal normal.      Nose: Congestion and rhinorrhea present.      Mouth/Throat:      Pharynx: Posterior oropharyngeal erythema present.   Eyes:      General: Lids are normal.      Extraocular Movements:      Right eye: Normal extraocular motion.      Left eye: Normal extraocular motion.      Conjunctiva/sclera:      Right eye: Right conjunctiva is injected. No exudate or hemorrhage.     Left eye: Left conjunctiva is not injected.      Pupils: Pupils are equal, round, and reactive to light.   Cardiovascular:      Rate and Rhythm: Normal rate and regular rhythm.       Pulses: Normal pulses.      Heart sounds: No murmur heard.  Pulmonary:      Effort: No respiratory distress.      Breath sounds: No wheezing, rhonchi or rales.   Abdominal:      General: Bowel sounds are normal.      Palpations: Abdomen is soft.      Tenderness: There is no abdominal tenderness.      Hernia: No hernia is present.   Musculoskeletal:      Cervical back: Normal range of motion and neck supple.   Lymphadenopathy:      Cervical: No cervical adenopathy.   Skin:     General: Skin is warm and dry.   Neurological:      Mental Status: She is alert.   Psychiatric:         Behavior: Behavior normal.         Thought Content: Thought content normal.          Assessment and Plan (including Health Maintenance)   :    Plan:         Health Maintenance Due   Topic Date Due    Hepatitis C Screening  Never done    Lipid Panel  Never done    DEXA Scan  06/14/2021    COVID-19 Vaccine (4 - Booster for Moderna series) 04/30/2022    Colorectal Cancer Screening  07/16/2022       Problem List Items Addressed This Visit    None     Visit Diagnoses     Acute recurrent maxillary sinusitis    -  Primary    Relevant Medications    dexamethasone injection 4 mg (Completed)    cefTRIAXone injection 1 g (Completed)    methylPREDNISolone acetate injection 40 mg (Completed)    cefUROXime (CEFTIN) 250 MG tablet    Scleritis of left eye        Relevant Medications    tobramycin-dexamethasone 0.3-0.1% (TOBRADEX) 0.3-0.1 % DrpS        Acute recurrent maxillary sinusitis  -     dexamethasone injection 4 mg  -     cefTRIAXone injection 1 g  -     methylPREDNISolone acetate injection 40 mg  -     cefUROXime (CEFTIN) 250 MG tablet; Take 1 tablet (250 mg total) by mouth 2 (two) times daily.  Dispense: 20 tablet; Refill: 0    Scleritis of left eye  -     tobramycin-dexamethasone 0.3-0.1% (TOBRADEX) 0.3-0.1 % DrpS; Place 1 drop into the left eye every 4 (four) hours while awake.  Dispense: 5 mL; Refill: 0       Health Maintenance Topics with  due status: Not Due       Topic Last Completion Date    Mammogram 02/23/2022       Procedures     Future Appointments   Date Time Provider Department Center   3/29/2023 11:00 AM [facility] MAMMO1 [facility] MAMMO [facility]   6/13/2023  1:00 PM JAMES Pandya [facility] ROHAN Rain        No follow-ups on file.       Signature:  Tim Burgos MD  [facility]  [address] [place] [address]  [phone] Phone  [phone] Fax    Date of encounter: 6/15/22

## 2022-06-18 ENCOUNTER — HOSPITAL ENCOUNTER (EMERGENCY)
Facility: HOSPITAL | Age: 67
Discharge: HOME | End: 2022-06-18
Attending: PEDIATRICS
Payer: MEDICARE

## 2022-06-18 VITALS
BODY MASS INDEX: 32.55 KG/M2 | WEIGHT: 207.38 LBS | OXYGEN SATURATION: 100 % | HEIGHT: 67 IN | SYSTOLIC BLOOD PRESSURE: 137 MMHG | RESPIRATION RATE: 16 BRPM | HEART RATE: 86 BPM | DIASTOLIC BLOOD PRESSURE: 93 MMHG | TEMPERATURE: 98 F

## 2022-06-18 DIAGNOSIS — B02.30 HERPES ZOSTER WITH OPHTHALMIC COMPLICATION, UNSPECIFIED HERPES ZOSTER EYE DISEASE: Primary | ICD-10-CM

## 2022-06-18 PROCEDURE — 99283 EMERGENCY DEPT VISIT LOW MDM: CPT | Performed by: PEDIATRICS

## 2022-06-18 PROCEDURE — 99284 EMERGENCY DEPT VISIT MOD MDM: CPT

## 2022-06-18 RX ORDER — ACYCLOVIR 800 MG/1
800 TABLET ORAL
Qty: 50 TABLET | Refills: 0 | OUTPATIENT
Start: 2022-06-18 | End: 2022-08-12

## 2022-06-18 RX ORDER — GABAPENTIN 300 MG/1
300 CAPSULE ORAL 3 TIMES DAILY
Qty: 30 CAPSULE | Refills: 0 | Status: SHIPPED | OUTPATIENT
Start: 2022-06-18 | End: 2022-11-28

## 2022-06-18 NOTE — DISCHARGE INSTRUCTIONS
Continue previous antibiootics as started by pap.    Start gabapentin and acyclovir orally    Follow up with Dr Spencer--[facility] [place] at 9 am

## 2022-06-18 NOTE — ED NOTES
DR. LEVINE SPOKE WITH DR VIRGEN, WHO STATED TO SEND PT TO [facility] Monday 9AM FOR EVALUATION IN HIS CLINIC.

## 2022-06-18 NOTE — ED PROVIDER NOTES
Encounter Date: 6/18/2022       History     Chief Complaint   Patient presents with    Eye Problem       Patient reports redness swelling of the eye since last Tuesday.  Saw her primary care on Wednesday diagnosed with episcleritis or scleritis and started on oral antibiotics.  Reports ongoing worsening of the lesions with them including the nose forehead and side of face.  Reports blurred vision.  No history of some shingles vaccine.  No overt fevers.  She reports that there is pain and discomfort with the lesion and r        Review of patient's allergies indicates:   Allergen Reactions    Lisinopril      Cough      Statins-hmg-coa reductase inhibitors Hives and Itching     Past Medical History:   Diagnosis Date    Anxiety state     BMI 32.0-32.9,adult     Bronchitis     CHF (congestive heart failure)     Cobalamin deficiency     Deep vein thrombosis     Depressive disorder     GERD (gastroesophageal reflux disease)     Hypertension     Hypothyroidism     Lower extremity edema     Neuropathy     Osteoarthritis of both knees     Pain in left leg     Pain in right leg     Peripheral edema     Pulmonary embolism     Varicose veins of bilateral lower extremities with pain     Vitamin D deficiency      Past Surgical History:   Procedure Laterality Date    BILATERAL TUBAL LIGATION      CARDIAC CATHETERIZATION      COLONOSCOPY      knee scope Left     THYROIDECTOMY, PARTIAL      TONSILLECTOMY, ADENOIDECTOMY      TOTAL KNEE ARTHROPLASTY Right     TOTAL KNEE ARTHROPLASTY Right     TUBAL LIGATION      VAGINAL DELIVERY      x 3     Family History   Problem Relation Age of Onset    Arthritis Sister     Arthritis Brother     Heart disease Brother     Stroke Brother      Social History     Tobacco Use    Smoking status: Never Smoker    Smokeless tobacco: Never Used   Substance Use Topics    Alcohol use: Never    Drug use: Never     Review of Systems   Eyes: Positive for photophobia, pain,  discharge, redness, itching and visual disturbance.   Skin: Positive for rash.   All other systems reviewed and are negative.      Physical Exam     Initial Vitals [06/18/22 1101]   BP Pulse Resp Temp SpO2   (!) 137/93 86 16 98.3 °F (36.8 °C) 100 %      MAP       --         Physical Exam    Constitutional: She appears well-developed.   HENT:   Head: Normocephalic.   Right Ear: External ear normal.   Left Ear: External ear normal.   Nose: Nose normal.   Mouth/Throat: Oropharynx is clear and moist. No oropharyngeal exudate.   Eyes: EOM are normal. Pupils are equal, round, and reactive to light. Right eye exhibits no discharge. Left eye exhibits discharge. Left eye exhibits no exudate and no hordeolum. Left eye chemosis:  above the eyebrow and on the bridge of the nose. No foreign body present in the left eye. Left conjunctiva is injected. Left conjunctiva has no hemorrhage.     Small vesicular type lesions on the upper eyelid and lateral eye    Neck: Neck supple.   Normal range of motion.  Pulmonary/Chest: No respiratory distress.   Musculoskeletal:      Cervical back: Normal range of motion and neck supple.     Lymphadenopathy:     She has no cervical adenopathy.   Skin: Skin is warm and dry. Capillary refill takes less than 2 seconds.     Lesions around the bridging left supraorbital area extending to the left side of the scalp to slightly posterior of the ear.  There are no lesions in the ear in the nose or in the oral mucosa.   Psychiatric: She has a normal mood and affect. Her behavior is normal. Judgment and thought content normal.         Medical Screening Exam   See Full Note    ED Course   Procedures  Labs Reviewed - No data to display       Imaging Results    None          Medications - No data to display              ED Course as of 06/18/22 1202   Sat Jun 18, 2022   6554   1147 spoke to Dr. Spencer with [facility] [place].  He will see her at 9:00 a.m. on Monday to evaluate her eye. [DH]      ED Course  User Index  [DH] Delonte Sevilla MD          Clinical Impression:   Final diagnoses:  [B02.30] Herpes zoster with ophthalmic complication, unspecified herpes zoster eye disease (Primary)          ED Disposition Condition    Discharge Stable        ED Prescriptions     Medication Sig Dispense Start Date End Date Auth. Provider    acyclovir (ZOVIRAX) 800 MG Tab Take 1 tablet (800 mg total) by mouth 5 (five) times daily. for 10 days 50 tablet 6/18/2022 6/28/2022 Delonte Sevilla MD    gabapentin (NEURONTIN) 300 MG capsule Take 1 capsule (300 mg total) by mouth 3 (three) times daily. for 10 days 30 capsule 6/18/2022 6/28/2022 Delonte Sevilla MD        Follow-up Information     Follow up With Specialties Details Why Contact Info    Tim Burgos MD Family Medicine In 3 days  [address] [facility] [address] [facility] [address]  [phone]      Tim Burgos MD Family Medicine   [address] [facility] [address] [facility] [address]  [phone]             Delonte Sevilla MD  06/18/22 7566

## 2022-06-18 NOTE — ED TRIAGE NOTES
PATIENT STARTED STARTED SWELLING ON Monday; WENT TO DR. LINDA ON Wednesday WITH DIAGNOSIS OF SCLERITIS & WAS PLACED ON CEFUROXIME AXETIL 250MG BID; AREA HAS NOW SPREAD TO LEFT EYELID/NOSE/HEAD; PATIENT C/O ITCHING, BURINGING, STINGING, & PAINFUL

## 2022-06-20 ENCOUNTER — TELEPHONE (OUTPATIENT)
Dept: EMERGENCY MEDICINE | Facility: HOSPITAL | Age: 67
End: 2022-06-20
Payer: MEDICARE

## 2022-08-12 ENCOUNTER — HOSPITAL ENCOUNTER (EMERGENCY)
Facility: HOSPITAL | Age: 67
Discharge: HOME | End: 2022-08-12
Attending: SPECIALIST
Payer: MEDICARE

## 2022-08-12 VITALS
DIASTOLIC BLOOD PRESSURE: 84 MMHG | RESPIRATION RATE: 18 BRPM | OXYGEN SATURATION: 98 % | BODY MASS INDEX: 31.39 KG/M2 | HEIGHT: 67 IN | WEIGHT: 200 LBS | HEART RATE: 68 BPM | TEMPERATURE: 98 F | SYSTOLIC BLOOD PRESSURE: 129 MMHG

## 2022-08-12 DIAGNOSIS — S80.01XA CONTUSION OF RIGHT KNEE, INITIAL ENCOUNTER: ICD-10-CM

## 2022-08-12 DIAGNOSIS — S80.02XA CONTUSION OF LEFT KNEE, INITIAL ENCOUNTER: ICD-10-CM
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
## 2022-08-12 DIAGNOSIS — S40.022A CONTUSION OF LEFT UPPER EXTREMITY, INITIAL ENCOUNTER: ICD-10-CM

## 2022-08-12 DIAGNOSIS — S09.90XA INJURY OF HEAD, INITIAL ENCOUNTER: Primary | ICD-10-CM

## 2022-08-12 PROCEDURE — 99282 EMERGENCY DEPT VISIT SF MDM: CPT | Performed by: SPECIALIST

## 2022-08-12 PROCEDURE — 99284 EMERGENCY DEPT VISIT MOD MDM: CPT | Mod: 25

## 2022-08-12 NOTE — DISCHARGE INSTRUCTIONS
Keep area of abrasion clean and dry  Follow up with your primary doctor as needed  Follow up with ER if increased symptoms nausea and vomiting, dizziness, headache or any acute changes

## 2022-08-12 NOTE — ED PROVIDER NOTES
Encounter Date: 8/12/2022       History     Chief Complaint   Patient presents with    Fall     Patient is a 65 yo wf who fell after tripping on a piece of wood that flipped up in the air.  She has facial abrasions and left arm abrasions.  Alert and oriented x 4 and in NAD.        Review of patient's allergies indicates:   Allergen Reactions    Lisinopril      Cough      Statins-hmg-coa reductase inhibitors Hives and Itching     Past Medical History:   Diagnosis Date    Anxiety state     BMI 32.0-32.9,adult     Bronchitis     CHF (congestive heart failure)     Cobalamin deficiency     Deep vein thrombosis     Depressive disorder     GERD (gastroesophageal reflux disease)     Hypertension     Hypothyroidism     Lower extremity edema     Neuropathy     Osteoarthritis of both knees     Pain in left leg     Pain in right leg     Peripheral edema     Pulmonary embolism     Varicose veins of bilateral lower extremities with pain     Vitamin D deficiency      Past Surgical History:   Procedure Laterality Date    BILATERAL TUBAL LIGATION      CARDIAC CATHETERIZATION      COLONOSCOPY      knee scope Left     THYROIDECTOMY, PARTIAL      TONSILLECTOMY, ADENOIDECTOMY      TOTAL KNEE ARTHROPLASTY Right     TOTAL KNEE ARTHROPLASTY Right     TUBAL LIGATION      VAGINAL DELIVERY      x 3     Family History   Problem Relation Age of Onset    Arthritis Sister     Arthritis Brother     Heart disease Brother     Stroke Brother      Social History     Tobacco Use    Smoking status: Never Smoker    Smokeless tobacco: Never Used   Substance Use Topics    Alcohol use: Never    Drug use: Never     Review of Systems   Constitutional: Positive for activity change.   HENT: Negative.    Eyes: Negative.    Respiratory: Negative.    Cardiovascular: Negative.    Gastrointestinal: Negative.    Genitourinary: Negative.    Musculoskeletal: Positive for gait problem. Negative for joint swelling, neck pain and  neck stiffness.   Allergic/Immunologic: Negative.    Hematological: Negative.    Psychiatric/Behavioral: Negative.    All other systems reviewed and are negative.      Physical Exam     Initial Vitals [08/12/22 1855]   BP Pulse Resp Temp SpO2   (!) 148/74 71 18 98 °F (36.7 °C) 98 %      MAP       --         Physical Exam    Nursing note and vitals reviewed.  Constitutional: She appears well-developed and well-nourished. No distress.   HENT:   Head: Normocephalic.   Abrasion on nose 3.5 cm x 1.4 cm    Eyes: Conjunctivae are normal. Pupils are equal, round, and reactive to light.   Neck: Neck supple.   Normal range of motion.  Musculoskeletal:         General: Normal range of motion.      Cervical back: Normal range of motion and neck supple.      Comments: Abrasion on left knee     Neurological: She is alert and oriented to person, place, and time. She has normal strength. GCS score is 15. GCS eye subscore is 4. GCS verbal subscore is 5. GCS motor subscore is 6.   Skin: Skin is warm.   Psychiatric: She has a normal mood and affect. Her behavior is normal.         Medical Screening Exam   See Full Note    ED Course   Procedures  Labs Reviewed - No data to display       Imaging Results          CT Head Without Contrast (Final result)  Result time 08/12/22 19:53:51    Final result by Kalyan Mayes MD (08/12/22 19:53:51)                 Impression:      No acute intracranial process    Chronic ischemic changes      Electronically signed by: Kalyan Mayes  Date:    08/12/2022  Time:    19:53             Narrative:    EXAMINATION:  CT head without contrast    CLINICAL HISTORY:  fall with injury to head and face;    TECHNIQUE:  Transaxial CT sections were obtained through the brain without contrast.    The CT examination was performed using one or more of the following dose reduction techniques: Automated exposure control, adjustment of the mA and kV according to patient's size, use of acute or iterative  reconstruction techniques.    COMPARISON:  December 18, 2015 head CT    FINDINGS:  The ventricles are midline in position without evidence of hydrocephalus. There is no mass or area of parenchymal hemorrhage. There is no gross CT evidence of acute cortical stroke.  Small areas of chronic lacunar ischemia are noted in the basal ganglia regions bilaterally as before.  There is no extra-axial hematoma. The partially visualized sinuses are generally clear. There is no obvious skull fracture.                               CT Maxillofacial Without Contrast (Final result)  Result time 08/12/22 19:57:28    Final result by Kalyan Mayes MD (08/12/22 19:57:28)                 Impression:      No acute facial fracture    Left ethmoid sinus disease      Electronically signed by: Kalyan Mayes  Date:    08/12/2022  Time:    19:57             Narrative:    EXAMINATION:  CT MAXILLOFACIAL WITHOUT CONTRAST    CLINICAL HISTORY:  Nasal fracture suspected;.    Trauma.  Fall.  Head injury    COMPARISON:  No previous similar    TECHNIQUE:  Spiral CT sections were obtained through the facial bones without contrast.  Sagittal and coronal multiplanar reconstruction images are also examined.    The CT examination was performed using one or more of the following dose reduction techniques: Automated exposure control, adjustment of the mA and kV according to patient's size, use of acute or iterative reconstruction techniques.    FINDINGS:  There is no acute facial fracture.  No matt fluid levels are identified within the paranasal sinuses.  There is some focal mucosal thickening posterior in the left ethmoid air cells.    Globes are intact.                               X-Ray Knee 1 or 2 View Left (Final result)  Result time 08/12/22 19:52:07    Final result by Kalyan Mayes MD (08/12/22 19:52:07)                 Impression:      No acute fracture    Previous left knee replacement      Electronically signed by: Kalyan  Gerber  Date:    08/12/2022  Time:    19:52             Narrative:    EXAMINATION:  XR KNEE 1 OR 2 VIEW LEFT    CLINICAL HISTORY:  fall;.    Trauma    COMPARISON:  February 2, 2016 left knee x-ray    TECHNIQUE:  Left knee AP and lateral views    FINDINGS:  Left knee prosthesis is well conjugated.  There is no evidence of loosening of the prosthesis.  There is no acute fracture or dislocation.                               X-Ray Knee 1 or 2 View Right (Final result)  Result time 08/12/22 19:51:17    Final result by Kalyan Mayes MD (08/12/22 19:51:17)                 Impression:      No acute fracture    Previous right knee replacement      Electronically signed by: Kalyan Mayes  Date:    08/12/2022  Time:    19:51             Narrative:    EXAMINATION:  XR KNEE 1 OR 2 VIEW RIGHT    CLINICAL HISTORY:  fall;.    Trauma    COMPARISON:  April 18, 2019 right knee x-ray    TECHNIQUE:  Right knee AP and lateral two views    FINDINGS:  Right knee prosthesis is well conjugated.  There is no evidence of loosening of the prosthesis.  There is no fracture or dislocation.                                 Medications - No data to display                    Clinical Impression:   Final diagnoses:  [S09.90XA] Injury of head, initial encounter (Primary)  [W19.XXXA] Fall, initial encounter  [S40.022A] Contusion of left upper extremity, initial encounter  [S80.02XA] Contusion of left knee, initial encounter  [S80.01XA] Contusion of right knee, initial encounter          ED Disposition Condition    Discharge Stable        ED Prescriptions     None        Follow-up Information     Follow up With Specialties Details Why Contact Info    Tim Burgos MD Family Medicine In 1 week As needed [address] [facility] [address]  [phone]             Kellie Bales MD  08/12/22 2028

## 2022-08-13 ENCOUNTER — TELEPHONE (OUTPATIENT)
Dept: EMERGENCY MEDICINE | Facility: HOSPITAL | Age: 67
End: 2022-08-13
Payer: MEDICARE

## 2022-09-08 ENCOUNTER — OFFICE VISIT (OUTPATIENT)
Dept: FAMILY MEDICINE | Facility: CLINIC | Age: 67
End: 2022-09-08
Payer: MEDICARE

## 2022-09-08 VITALS
BODY MASS INDEX: 32.21 KG/M2 | OXYGEN SATURATION: 98 % | TEMPERATURE: 98 F | HEART RATE: 81 BPM | WEIGHT: 205.25 LBS | DIASTOLIC BLOOD PRESSURE: 86 MMHG | HEIGHT: 67 IN | SYSTOLIC BLOOD PRESSURE: 124 MMHG

## 2022-09-08 DIAGNOSIS — K59.00 CONSTIPATION, UNSPECIFIED CONSTIPATION TYPE: ICD-10-CM

## 2022-09-08 DIAGNOSIS — I10 HYPERTENSION, UNSPECIFIED TYPE: Primary | ICD-10-CM

## 2022-09-08 DIAGNOSIS — R30.0 DYSURIA: ICD-10-CM

## 2022-09-08 DIAGNOSIS — E03.9 HYPOTHYROIDISM, UNSPECIFIED TYPE: ICD-10-CM

## 2022-09-08 LAB
ALBUMIN SERPL BCP-MCNC: 3.7 G/DL (ref 3.5–5)
ALBUMIN/GLOB SERPL: 1.2 {RATIO}
ALP SERPL-CCNC: 107 U/L (ref 55–142)
ALT SERPL W P-5'-P-CCNC: 18 U/L (ref 13–56)
ANION GAP SERPL CALCULATED.3IONS-SCNC: 10 MMOL/L (ref 7–16)
AST SERPL W P-5'-P-CCNC: 19 U/L (ref 15–37)
BASOPHILS # BLD AUTO: 0.03 K/UL (ref 0–0.2)
BASOPHILS NFR BLD AUTO: 0.4 % (ref 0–1)
BILIRUB SERPL-MCNC: 1 MG/DL (ref ?–1.2)
BILIRUB SERPL-MCNC: NEGATIVE MG/DL
BLOOD URINE, POC: ABNORMAL
BUN SERPL-MCNC: 10 MG/DL (ref 7–18)
BUN/CREAT SERPL: 9 (ref 6–20)
CALCIUM SERPL-MCNC: 9.3 MG/DL (ref 8.5–10.1)
CHLORIDE SERPL-SCNC: 104 MMOL/L (ref 98–107)
CHOLEST SERPL-MCNC: 186 MG/DL (ref 0–200)
CHOLEST/HDLC SERPL: 5.6 {RATIO}
CO2 SERPL-SCNC: 29 MMOL/L (ref 21–32)
COLOR, POC UA: YELLOW
CREAT SERPL-MCNC: 1.17 MG/DL (ref 0.55–1.02)
DIFFERENTIAL METHOD BLD: ABNORMAL
EGFR (NO RACE VARIABLE) (RUSH/TITUS): 51 ML/MIN/1.73M²
EOSINOPHIL # BLD AUTO: 0.09 K/UL (ref 0–0.5)
EOSINOPHIL NFR BLD AUTO: 1.1 % (ref 1–4)
ERYTHROCYTE [DISTWIDTH] IN BLOOD BY AUTOMATED COUNT: 13 % (ref 11.5–14.5)
GLOBULIN SER-MCNC: 3 G/DL (ref 2–4)
GLUCOSE SERPL-MCNC: 110 MG/DL (ref 74–106)
GLUCOSE UR QL STRIP: NEGATIVE
HCT VFR BLD AUTO: 41.7 % (ref 38–47)
HDLC SERPL-MCNC: 33 MG/DL (ref 40–60)
HGB BLD-MCNC: 14.3 G/DL (ref 12–16)
IMM GRANULOCYTES # BLD AUTO: 0.02 K/UL (ref 0–0.04)
IMM GRANULOCYTES NFR BLD: 0.2 % (ref 0–0.4)
KETONES UR QL STRIP: NEGATIVE
LDLC SERPL CALC-MCNC: 108 MG/DL
LDLC/HDLC SERPL: 3.3 {RATIO}
LEUKOCYTE ESTERASE URINE, POC: ABNORMAL
LYMPHOCYTES # BLD AUTO: 2.5 K/UL (ref 1–4.8)
LYMPHOCYTES NFR BLD AUTO: 30.5 % (ref 27–41)
MCH RBC QN AUTO: 32.6 PG (ref 27–31)
MCHC RBC AUTO-ENTMCNC: 34.3 G/DL (ref 32–36)
MCV RBC AUTO: 95 FL (ref 80–96)
MONOCYTES # BLD AUTO: 0.81 K/UL (ref 0–0.8)
MONOCYTES NFR BLD AUTO: 9.9 % (ref 2–6)
MPC BLD CALC-MCNC: 10.2 FL (ref 9.4–12.4)
NEUTROPHILS # BLD AUTO: 4.76 K/UL (ref 1.8–7.7)
NEUTROPHILS NFR BLD AUTO: 57.9 % (ref 53–65)
NITRITE, POC UA: NEGATIVE
NONHDLC SERPL-MCNC: 153 MG/DL
NRBC # BLD AUTO: 0 X10E3/UL
NRBC, AUTO (.00): 0 %
PH, POC UA: 6.5
PLATELET # BLD AUTO: 256 K/UL (ref 150–400)
POTASSIUM SERPL-SCNC: 4 MMOL/L (ref 3.5–5.1)
PROT SERPL-MCNC: 6.7 G/DL (ref 6.4–8.2)
PROTEIN, POC: NEGATIVE
RBC # BLD AUTO: 4.39 M/UL (ref 4.2–5.4)
SODIUM SERPL-SCNC: 139 MMOL/L (ref 136–145)
SPECIFIC GRAVITY, POC UA: 1.01
T4 FREE SERPL-MCNC: 1.25 NG/DL (ref 0.76–1.46)
TRIGL SERPL-MCNC: 225 MG/DL (ref 35–150)
TSH SERPL DL<=0.005 MIU/L-ACNC: 1.67 UIU/ML (ref 0.36–3.74)
UROBILINOGEN, POC UA: 0.2
VLDLC SERPL-MCNC: 45 MG/DL
WBC # BLD AUTO: 8.21 K/UL (ref 4.5–11)

## 2022-09-08 PROCEDURE — 84443 TSH: ICD-10-PCS | Mod: ,,, | Performed by: CLINICAL MEDICAL LABORATORY

## 2022-09-08 PROCEDURE — 80053 COMPREHEN METABOLIC PANEL: CPT | Mod: ,,, | Performed by: CLINICAL MEDICAL LABORATORY

## 2022-09-08 PROCEDURE — 85025 CBC WITH DIFFERENTIAL: ICD-10-PCS | Mod: ,,, | Performed by: CLINICAL MEDICAL LABORATORY

## 2022-09-08 PROCEDURE — 84439 ASSAY OF FREE THYROXINE: CPT | Mod: ,,, | Performed by: CLINICAL MEDICAL LABORATORY

## 2022-09-08 PROCEDURE — 84443 ASSAY THYROID STIM HORMONE: CPT | Mod: ,,, | Performed by: CLINICAL MEDICAL LABORATORY

## 2022-09-08 PROCEDURE — 80053 COMPREHENSIVE METABOLIC PANEL: ICD-10-PCS | Mod: ,,, | Performed by: CLINICAL MEDICAL LABORATORY

## 2022-09-08 PROCEDURE — 99214 OFFICE O/P EST MOD 30 MIN: CPT | Mod: ,,, | Performed by: FAMILY MEDICINE

## 2022-09-08 PROCEDURE — 80061 LIPID PANEL: CPT | Mod: ,,, | Performed by: CLINICAL MEDICAL LABORATORY

## 2022-09-08 PROCEDURE — 84439 T4, FREE: ICD-10-PCS | Mod: ,,, | Performed by: CLINICAL MEDICAL LABORATORY

## 2022-09-08 PROCEDURE — 99214 PR OFFICE/OUTPT VISIT, EST, LEVL IV, 30-39 MIN: ICD-10-PCS | Mod: ,,, | Performed by: FAMILY MEDICINE

## 2022-09-08 PROCEDURE — 81003 URINALYSIS AUTO W/O SCOPE: CPT | Mod: RHCUB | Performed by: FAMILY MEDICINE

## 2022-09-08 PROCEDURE — 85025 COMPLETE CBC W/AUTO DIFF WBC: CPT | Mod: ,,, | Performed by: CLINICAL MEDICAL LABORATORY

## 2022-09-08 PROCEDURE — 80061 LIPID PANEL: ICD-10-PCS | Mod: ,,, | Performed by: CLINICAL MEDICAL LABORATORY

## 2022-09-08 RX ORDER — PREDNISOLONE ACETATE 10 MG/ML
1 SUSPENSION/ DROPS OPHTHALMIC DAILY
COMMUNITY
Start: 2022-07-08

## 2022-09-08 RX ORDER — POTASSIUM CHLORIDE 750 MG/1
10 TABLET, EXTENDED RELEASE ORAL DAILY
Qty: 90 TABLET | Refills: 1 | Status: SHIPPED | OUTPATIENT
Start: 2022-09-08 | End: 2023-08-08

## 2022-09-08 RX ORDER — SERTRALINE HYDROCHLORIDE 100 MG/1
100 TABLET, FILM COATED ORAL DAILY
Qty: 90 TABLET | Refills: 1 | Status: SHIPPED | OUTPATIENT
Start: 2022-09-08 | End: 2023-07-17 | Stop reason: SDUPTHER

## 2022-09-08 RX ORDER — POTASSIUM CHLORIDE 750 MG/1
10 TABLET, EXTENDED RELEASE ORAL DAILY
Qty: 90 TABLET | Refills: 1 | Status: CANCELLED | OUTPATIENT
Start: 2022-09-08

## 2022-09-08 RX ORDER — SPIRONOLACTONE 25 MG/1
25 TABLET ORAL DAILY
Qty: 90 TABLET | Refills: 1 | Status: SHIPPED | OUTPATIENT
Start: 2022-09-08 | End: 2023-07-17 | Stop reason: SDUPTHER

## 2022-09-08 RX ORDER — LEVOTHYROXINE SODIUM 25 UG/1
25 TABLET ORAL DAILY
Qty: 90 TABLET | Refills: 1 | Status: SHIPPED | OUTPATIENT
Start: 2022-09-08 | End: 2023-07-17 | Stop reason: SDUPTHER

## 2022-09-08 RX ORDER — OMEPRAZOLE 20 MG/1
20 CAPSULE, DELAYED RELEASE ORAL DAILY
Qty: 90 CAPSULE | Refills: 1 | Status: SHIPPED | OUTPATIENT
Start: 2022-09-08 | End: 2022-12-07 | Stop reason: HOSPADM

## 2022-09-08 RX ORDER — FUROSEMIDE 20 MG/1
20 TABLET ORAL DAILY
Qty: 90 TABLET | Refills: 1 | Status: SHIPPED | OUTPATIENT
Start: 2022-09-08 | End: 2023-07-17 | Stop reason: SDUPTHER

## 2022-09-08 RX ORDER — SULFAMETHOXAZOLE AND TRIMETHOPRIM 800; 160 MG/1; MG/1
1 TABLET ORAL 2 TIMES DAILY
Qty: 14 TABLET | Refills: 0 | Status: SHIPPED | OUTPATIENT
Start: 2022-09-08 | End: 2022-11-23 | Stop reason: ALTCHOICE

## 2022-09-08 RX ORDER — LOSARTAN POTASSIUM 50 MG/1
50 TABLET ORAL DAILY
Qty: 90 TABLET | Refills: 1 | Status: SHIPPED | OUTPATIENT
Start: 2022-09-08 | End: 2023-07-07 | Stop reason: SDUPTHER

## 2022-09-08 NOTE — PROGRESS NOTES
Tim Burgos MD   [facility]  [address] [place] [address]     PATIENT NAME: Victoria Delarosa  : 1955  DATE: 22  MRN: [id number]      Billing Provider: Tim Burgos MD  Level of Service: OK OFFICE/OUTPT VISIT, EST, LEVL IV, 30-39 MIN  Patient PCP Information       Provider PCP Type    Tim Burgos MD General            Reason for Visit / Chief Complaint: Urinary Tract Infection (Dysuria and itching x 2-3 weeks. States it is worse now.), Eye Problem (Recheck left eye. Ongoing redness since having shingles in .), Hypertension (Fasting labs and med refills), and Hypothyroidism         History of Present Illness / Problem Focused Workflow     Victoria Delarosa presents to the clinic with Urinary Tract Infection (Dysuria and itching x 2-3 weeks. States it is worse now.), Eye Problem (Recheck left eye. Ongoing redness since having shingles in .), Hypertension (Fasting labs and med refills), and Hypothyroidism     HPI    Review of Systems     Review of Systems   Constitutional:  Negative for activity change, appetite change, fatigue and fever.   HENT:  Negative for nasal congestion, ear pain, hearing loss, sinus pressure/congestion and sore throat.    Respiratory:  Negative for cough, chest tightness and shortness of breath.    Cardiovascular:  Negative for chest pain and palpitations.   Gastrointestinal:  Negative for abdominal pain and fecal incontinence.   Genitourinary:  Positive for difficulty urinating, dysuria, flank pain and hematuria. Negative for bladder incontinence.   Musculoskeletal:  Negative for arthralgias.   Integumentary:  Negative for rash.   Neurological:  Negative for dizziness and headaches.      Medical / Social / Family History     Past Medical History:   Diagnosis Date    Anxiety state     BMI 32.0-32.9,adult     Bronchitis     CHF (congestive heart failure)     Cobalamin deficiency     Deep vein thrombosis     Depressive  disorder     GERD (gastroesophageal reflux disease)     Hypertension     Hypothyroidism     Lower extremity edema     Neuropathy     Osteoarthritis of both knees     Pain in left leg     Pain in right leg     Peripheral edema     Pulmonary embolism     Varicose veins of bilateral lower extremities with pain     Vitamin D deficiency        Past Surgical History:   Procedure Laterality Date    BILATERAL TUBAL LIGATION      CARDIAC CATHETERIZATION      COLONOSCOPY      knee scope Left     THYROIDECTOMY, PARTIAL      TONSILLECTOMY, ADENOIDECTOMY      TOTAL KNEE ARTHROPLASTY Right     TOTAL KNEE ARTHROPLASTY Right     TUBAL LIGATION      VAGINAL DELIVERY      x 3       Social History  Victoria Delarosa  reports that she has never smoked. She has never used smokeless tobacco. She reports that she does not drink alcohol and does not use drugs.    Family History  Victoria Delarosa  family history includes Arthritis in her brother and sister; Heart disease in her brother; Stroke in her brother.    Medications and Allergies     Medications  Outpatient Medications Marked as Taking for the 9/8/22 encounter (Office Visit) with Tim Burgos MD   Medication Sig Dispense Refill    cyanocobalamin 1,000 mcg/mL injection INJECT 1 ML EVERY MONTH 1 mL 11    ezetimibe (ZETIA) 10 mg tablet Take 10 mg by mouth once daily.      fluticasone propionate (FLONASE) 50 mcg/actuation nasal spray by Each Nostril route once daily.      magnesium oxide (MAG-OX) 400 mg (241.3 mg magnesium) tablet Take 1 tablet by mouth once daily.      oxybutynin (DITROPAN) 5 MG Tab Take 1 tablet by mouth 2 (two) times daily.      prednisoLONE acetate (PRED FORTE) 1 % DrpS Place 1 drop into the left eye once daily.      propylene glycol/peg 400 (SYSTANE ULTRA OPHT) Apply 1 drop to eye as needed.      ranolazine (RANEXA) 500 MG Tb12 Take 500 mg by mouth 2 (two) times daily.      warfarin (COUMADIN) 5 MG tablet Take 5 mg by mouth Daily. Take 5mg daily except on  Thursday. Thursday take 7.5mg.      [DISCONTINUED] furosemide (LASIX) 20 MG tablet TAKE 1 TABLET BY MOUTH EVERY DAY 90 tablet 0    [DISCONTINUED] levothyroxine (SYNTHROID) 25 MCG tablet TAKE ONE TABLET BY MOUTH EVERY DAY 90 tablet 1    [DISCONTINUED] losartan (COZAAR) 50 MG tablet TAKE ONE TABLET BY MOUTH EVERY DAY FOR BLOOD PRESSURE 90 tablet 1    [DISCONTINUED] omeprazole (PRILOSEC) 20 MG capsule TAKE ONE CAPSULE BY MOUTH DAILY 90 capsule 1    [DISCONTINUED] potassium chloride SA (K-DUR,KLOR-CON) 10 MEQ tablet TAKE ONE TABLET BY MOUTH EVERY DAY 90 tablet 1    [DISCONTINUED] sertraline (ZOLOFT) 100 MG tablet TAKE 1 TABLET BY MOUTH EVERY DAY 90 tablet 1    [DISCONTINUED] spironolactone (ALDACTONE) 25 MG tablet TAKE ONE TABLET BY MOUTH EVERY DAY 90 tablet 1       Allergies  Review of patient's allergies indicates:   Allergen Reactions    Lisinopril      Cough      Statins-hmg-coa reductase inhibitors Hives and Itching       Physical Examination     Vitals:    09/08/22 1020   BP: 124/86   Pulse: 81   Temp: 98 °F (36.7 °C)     Physical Exam  Constitutional:       General: She is not in acute distress.     Appearance: She is not ill-appearing.   HENT:      Head: Normocephalic and atraumatic.      Right Ear: Tympanic membrane and ear canal normal.      Left Ear: Tympanic membrane and ear canal normal.      Nose: Nose normal. No congestion or rhinorrhea.   Eyes:      Pupils: Pupils are equal, round, and reactive to light.   Cardiovascular:      Rate and Rhythm: Normal rate and regular rhythm.      Pulses: Normal pulses.      Heart sounds: No murmur heard.  Pulmonary:      Effort: No respiratory distress.      Breath sounds: No wheezing, rhonchi or rales.   Abdominal:      General: Bowel sounds are normal.      Palpations: Abdomen is soft.      Tenderness: There is no abdominal tenderness.      Hernia: No hernia is present.   Musculoskeletal:      Cervical back: Normal range of motion and neck supple.   Lymphadenopathy:       Cervical: No cervical adenopathy.   Skin:     General: Skin is warm and dry.   Neurological:      Mental Status: She is alert.   Psychiatric:         Behavior: Behavior normal.         Thought Content: Thought content normal.        Assessment and Plan (including Health Maintenance)   :    Plan:         Health Maintenance Due   Topic Date Due    Hepatitis C Screening  Never done    Lipid Panel  Never done    DEXA Scan  06/14/2021    COVID-19 Vaccine (4 - Booster for Moderna series) 04/30/2022    Colorectal Cancer Screening  07/16/2022    Influenza Vaccine (1) 09/01/2022       Problem List Items Addressed This Visit          Cardiac/Vascular    Hypertension - Primary    Relevant Orders    CBC Auto Differential    Comprehensive Metabolic Panel    Lipid Panel       Endocrine    Hypothyroidism    Relevant Orders    CBC Auto Differential    Comprehensive Metabolic Panel    TSH    T4, Free     Other Visit Diagnoses       Dysuria        Relevant Orders    POCT URINALYSIS W/O SCOPE (Completed)    Constipation, unspecified constipation type        BMI 32.0-32.9,adult              Hypertension, unspecified type  -     CBC Auto Differential; Future; Expected date: 09/08/2022  -     Comprehensive Metabolic Panel; Future; Expected date: 09/08/2022  -     Lipid Panel; Future; Expected date: 09/08/2022    Hypothyroidism, unspecified type  -     CBC Auto Differential; Future; Expected date: 09/08/2022  -     Comprehensive Metabolic Panel; Future; Expected date: 09/08/2022  -     TSH; Future; Expected date: 09/08/2022  -     T4, Free; Future; Expected date: 09/08/2022    Dysuria  -     POCT URINALYSIS W/O SCOPE    Constipation, unspecified constipation type    BMI 32.0-32.9,adult    Other orders  -     furosemide (LASIX) 20 MG tablet; Take 1 tablet (20 mg total) by mouth once daily.  Dispense: 90 tablet; Refill: 1  -     levothyroxine (SYNTHROID) 25 MCG tablet; Take 1 tablet (25 mcg total) by mouth once daily.  Dispense: 90  tablet; Refill: 1  -     losartan (COZAAR) 50 MG tablet; Take 1 tablet (50 mg total) by mouth once daily.  Dispense: 90 tablet; Refill: 1  -     omeprazole (PRILOSEC) 20 MG capsule; Take 1 capsule (20 mg total) by mouth once daily.  Dispense: 90 capsule; Refill: 1  -     potassium chloride SA (K-DUR,KLOR-CON M) 10 MEQ tablet; Take 1 tablet (10 mEq total) by mouth once daily.  Dispense: 90 tablet; Refill: 1  -     sertraline (ZOLOFT) 100 MG tablet; Take 1 tablet (100 mg total) by mouth once daily.  Dispense: 90 tablet; Refill: 1  -     spironolactone (ALDACTONE) 25 MG tablet; Take 1 tablet (25 mg total) by mouth once daily.  Dispense: 90 tablet; Refill: 1  -     sulfamethoxazole-trimethoprim 800-160mg (BACTRIM DS) 800-160 mg Tab; Take 1 tablet by mouth 2 (two) times daily.  Dispense: 14 tablet; Refill: 0     Health Maintenance Topics with due status: Not Due       Topic Last Completion Date    Mammogram 02/23/2022       Procedures     Future Appointments   Date Time Provider Department Center   10/5/2022 10:45 AM Femi Candelario MD [facility]   3/29/2023 11:00 AM [facility] MAMMO1 [facility] M[facility]   6/13/2023  1:00 PM Philly Gooden, JAMES [facility] ROHAN [facility]        No follow-ups on file.       Signature:  Tim Burgos MD  [facility]  [address] [place] [address]  [phone] Phone  [phone] Fax    Date of encounter: 9/8/22

## 2022-10-05 ENCOUNTER — OFFICE VISIT (OUTPATIENT)
Dept: OBSTETRICS AND GYNECOLOGY | Facility: CLINIC | Age: 67
End: 2022-10-05
Payer: MEDICARE

## 2022-10-05 VITALS
SYSTOLIC BLOOD PRESSURE: 132 MMHG | WEIGHT: 205.19 LBS | BODY MASS INDEX: 32.14 KG/M2 | DIASTOLIC BLOOD PRESSURE: 72 MMHG

## 2022-10-05 DIAGNOSIS — R10.11 RIGHT UPPER QUADRANT ABDOMINAL PAIN: Primary | ICD-10-CM

## 2022-10-05 DIAGNOSIS — N32.81 OAB (OVERACTIVE BLADDER): ICD-10-CM

## 2022-10-05 PROCEDURE — 99213 PR OFFICE/OUTPT VISIT, EST, LEVL III, 20-29 MIN: ICD-10-PCS | Mod: S$PBB,,, | Performed by: OBSTETRICS & GYNECOLOGY

## 2022-10-05 PROCEDURE — 99213 OFFICE O/P EST LOW 20 MIN: CPT | Mod: S$PBB,,, | Performed by: OBSTETRICS & GYNECOLOGY

## 2022-10-05 PROCEDURE — 99213 OFFICE O/P EST LOW 20 MIN: CPT | Mod: PBBFAC | Performed by: OBSTETRICS & GYNECOLOGY

## 2022-10-05 RX ORDER — OXYBUTYNIN CHLORIDE 5 MG/1
5 TABLET ORAL 2 TIMES DAILY
Qty: 180 TABLET | Refills: 2 | Status: SHIPPED | OUTPATIENT
Start: 2022-10-05 | End: 2023-08-01 | Stop reason: SDUPTHER

## 2022-10-05 NOTE — PROGRESS NOTES
Subjective:       Patient ID: Victoria Delarosa is a 66 y.o. female.    Chief Complaint: Urinary Incontinence (Last seen in Sept 2021, (had BST ) here for follow (just finished antibiotics for UTI) also c/o pain of right side of abdomen  worse since Sept 8th)    Patient call my office requesting more oxybutynin.      However she has recently been treated for urinary tract infection and was asked to present to the office for further evaluation.      She states the oxybutynin did decrease her urgency symptoms.    Problem 2.    Patient during examination stated she will often have right upper quadrant pain sometimes followed by vomiting.  This can happen after meals.  It is gradually worsened over the last 4-5 months.      On examination today there is some tenderness noted to deep palpation along the right costal margin.  Liver is not palpable.  There is no jaundice present.    Discussed my concerned that this may be related to gallstones.      Discussed ordering ultrasound and follow-up with Dr. An general surgeon.    Review of Systems      Objective:      Physical Exam  Abdominal:      Comments: Abdomen  generally soft nondistended.  However palpation along the right costal margin reveals some mild tenderness to  deep palpation.  However the liver was nonpalpable.         Genitourinary:     Comments: External normal vault normal cervix normal to appearance.  Bimanual exam revealed uterus normal size vigorous rectovaginal examination revealed no adnexal masses ovaries are nonpalpable no tenderness.    She will discuss scheduling colonoscopy screening or colon guard testing with her primary care provider.      Assessment:       1. OAB (overactive bladder)    2. Right upper quadrant abdominal pain        Plan:       Patient Instructions   Discussed follow-up ultrasound of the gallbladder to be scheduled.      Discussed consult sent to Dr. An she will notify me if she has not received appointment within the next 2  lula.  S     Discussed restarting oxybutynin once or twice daily as needed for overactive bladder syndrome.      She will continue routine follow-up with primary care provider     Discussed with primary care provider regarding either colonoscopy screening or colon guard testing.      Continue yearly screening mammography.

## 2022-10-05 NOTE — PATIENT INSTRUCTIONS
Discussed follow-up ultrasound of the gallbladder to be scheduled.      Discussed consult sent to Dr. An she will notify me if she has not received appointment within the next 2 weeks.  S     Discussed restarting oxybutynin once or twice daily as needed for overactive bladder syndrome.      She will continue routine follow-up with primary care provider     Discussed with primary care provider regarding either colonoscopy screening or colon guard testing.      Continue yearly screening mammography.

## 2022-10-09 DIAGNOSIS — Z71.89 COMPLEX CARE COORDINATION: ICD-10-CM

## 2022-10-12 ENCOUNTER — HOSPITAL ENCOUNTER (OUTPATIENT)
Dept: RADIOLOGY | Facility: HOSPITAL | Age: 67
Discharge: HOME | End: 2022-10-12
Attending: OBSTETRICS & GYNECOLOGY
Payer: MEDICARE

## 2022-10-12 DIAGNOSIS — R10.11 RIGHT UPPER QUADRANT ABDOMINAL PAIN: ICD-10-CM

## 2022-10-12 PROCEDURE — 76705 US ABDOMEN LIMITED: ICD-10-PCS | Mod: 26,,, | Performed by: RADIOLOGY

## 2022-10-12 PROCEDURE — 76705 ECHO EXAM OF ABDOMEN: CPT | Mod: TC

## 2022-10-12 PROCEDURE — 76705 ECHO EXAM OF ABDOMEN: CPT | Mod: 26,,, | Performed by: RADIOLOGY

## 2022-10-25 ENCOUNTER — OFFICE VISIT (OUTPATIENT)
Dept: SURGERY | Facility: CLINIC | Age: 67
End: 2022-10-25
Attending: SURGERY
Payer: MEDICARE

## 2022-10-25 DIAGNOSIS — R10.11 RIGHT UPPER QUADRANT ABDOMINAL PAIN: Primary | ICD-10-CM

## 2022-10-25 PROCEDURE — 99215 OFFICE O/P EST HI 40 MIN: CPT | Mod: PBBFAC | Performed by: SURGERY

## 2022-10-25 PROCEDURE — 99205 PR OFFICE/OUTPT VISIT, NEW, LEVL V, 60-74 MIN: ICD-10-PCS | Mod: S$PBB,,, | Performed by: SURGERY

## 2022-10-25 PROCEDURE — 99205 OFFICE O/P NEW HI 60 MIN: CPT | Mod: S$PBB,,, | Performed by: SURGERY

## 2022-10-25 NOTE — ASSESSMENT & PLAN NOTE
Schedule colonoscopy with Dr. Rodriguez (patient request) and HIDA scan.    Return to clinic after HIDA

## 2022-10-25 NOTE — PROGRESS NOTES
Subjective:       Patient ID: Victoria Delarosa is a 66 y.o. female.    Chief Complaint: Abdominal Pain (Pain in right upper quadrant abdomen)    66-year-old female patient presents with a history of right upper quadrant pain that is fairly constant.  She reports it is occasionally worse.  She has alternating constipation and diarrhea, but mostly constipation.  She recently underwent ultrasound which revealed no gallstones and normal-sized ducts.  The patient reports that she has not had colonoscopy in a long time.    Abdominal Pain    family history includes Arthritis in her brother and sister; Heart disease in her brother; Stroke in her brother.  Past Medical History:   Diagnosis Date    Anxiety state     BMI 32.0-32.9,adult     Bronchitis     CHF (congestive heart failure)     Cobalamin deficiency     Deep vein thrombosis     Depressive disorder     GERD (gastroesophageal reflux disease)     Hypertension     Hypothyroidism     Lower extremity edema     Neuropathy     Osteoarthritis of both knees     Pain in left leg     Pain in right leg     Peripheral edema     Pulmonary embolism     Varicose veins of bilateral lower extremities with pain     Vitamin D deficiency       Past Surgical History:   Procedure Laterality Date    BILATERAL TUBAL LIGATION      CARDIAC CATHETERIZATION      COLONOSCOPY      knee scope Left     THYROIDECTOMY, PARTIAL      TONSILLECTOMY, ADENOIDECTOMY      TOTAL KNEE ARTHROPLASTY Right     TOTAL KNEE ARTHROPLASTY Right     TUBAL LIGATION      VAGINAL DELIVERY      x 3       reports that she has never smoked. She has never used smokeless tobacco. She reports that she does not drink alcohol and does not use drugs.     Review of Systems   Gastrointestinal:  Positive for abdominal pain.   All other systems reviewed and are negative.       Objective:      There were no vitals taken for this visit.   Physical Exam  Cardiovascular:      Pulses: Normal pulses.   Pulmonary:       Effort: Pulmonary effort is normal. No respiratory distress.   Abdominal:      Palpations: Abdomen is soft. There is no mass.      Tenderness: There is abdominal tenderness. Guarding: right upper quadrant.   Musculoskeletal:         General: No swelling.   Skin:     Coloration: Skin is not jaundiced.   Neurological:      General: No focal deficit present.      Mental Status: She is alert.   Psychiatric:         Mood and Affect: Mood normal.       Assessment/Plan:         Right upper quadrant abdominal pain  -     Ambulatory referral/consult to General Surgery  -     NM Hepatobiliary (HIDA) W Pharm and [facility]; Future; Expected date: 10/25/2022  -     Cancel: Ambulatory referral/consult to Gastroenterology; Future; Expected date: 11/01/2022  -     Ambulatory referral/consult to Gastroenterology; Future; Expected date: 11/01/2022         Problem List Items Addressed This Visit          GI    Right upper quadrant abdominal pain - Primary    Overview     Ultrasound negative, has not had HIDA scan  Has not had recent colonoscopy           Current Assessment & Plan     Schedule colonoscopy with Dr. Rodriguez (patient request) and HIDA scan.    Return to clinic after HIDA         Relevant Orders    NM Hepatobiliary (HIDA) W [facility]    Ambulatory referral/consult to Gastroenterology

## 2022-10-26 ENCOUNTER — TELEPHONE (OUTPATIENT)
Dept: SURGERY | Facility: CLINIC | Age: 67
End: 2022-10-26
Payer: MEDICARE

## 2022-10-26 NOTE — TELEPHONE ENCOUNTER
Patient called and requests to cancel hida scan for now. She did not want to reschedule it at this time. Patient states she would like to see GI to be evaluated.Appointment for Hida cancelled.

## 2022-10-31 ENCOUNTER — HOSPITAL ENCOUNTER (OUTPATIENT)
Dept: RADIOLOGY | Facility: HOSPITAL | Age: 67
Discharge: HOME | End: 2022-10-31
Attending: NURSE PRACTITIONER
Payer: MEDICARE

## 2022-10-31 ENCOUNTER — OFFICE VISIT (OUTPATIENT)
Dept: GASTROENTEROLOGY | Facility: CLINIC | Age: 67
End: 2022-10-31
Payer: MEDICARE

## 2022-10-31 VITALS
HEIGHT: 67 IN | BODY MASS INDEX: 32.83 KG/M2 | SYSTOLIC BLOOD PRESSURE: 132 MMHG | OXYGEN SATURATION: 98 % | HEART RATE: 57 BPM | WEIGHT: 209.19 LBS | DIASTOLIC BLOOD PRESSURE: 77 MMHG

## 2022-10-31 DIAGNOSIS — R11.0 NAUSEA: ICD-10-CM

## 2022-10-31 DIAGNOSIS — Z86.010 HISTORY OF COLON POLYPS: ICD-10-CM

## 2022-10-31 DIAGNOSIS — R10.11 RIGHT UPPER QUADRANT ABDOMINAL PAIN: ICD-10-CM

## 2022-10-31 DIAGNOSIS — R19.7 DIARRHEA, UNSPECIFIED TYPE: ICD-10-CM

## 2022-10-31 DIAGNOSIS — K59.04 CHRONIC IDIOPATHIC CONSTIPATION: ICD-10-CM

## 2022-10-31 DIAGNOSIS — R10.10 UPPER ABDOMINAL PAIN, UNSPECIFIED: ICD-10-CM

## 2022-10-31 DIAGNOSIS — R10.11 RIGHT UPPER QUADRANT ABDOMINAL PAIN: Primary | ICD-10-CM

## 2022-10-31 PROCEDURE — 74018 RADEX ABDOMEN 1 VIEW: CPT | Mod: TC

## 2022-10-31 PROCEDURE — 74018 RADEX ABDOMEN 1 VIEW: CPT | Mod: 26,,, | Performed by: STUDENT IN AN ORGANIZED HEALTH CARE EDUCATION/TRAINING PROGRAM

## 2022-10-31 PROCEDURE — 74018 XR KUB: ICD-10-PCS | Mod: 26,,, | Performed by: STUDENT IN AN ORGANIZED HEALTH CARE EDUCATION/TRAINING PROGRAM

## 2022-10-31 PROCEDURE — 99214 OFFICE O/P EST MOD 30 MIN: CPT | Mod: ,,, | Performed by: NURSE PRACTITIONER

## 2022-10-31 PROCEDURE — 99214 PR OFFICE/OUTPT VISIT, EST, LEVL IV, 30-39 MIN: ICD-10-PCS | Mod: ,,, | Performed by: NURSE PRACTITIONER

## 2022-10-31 NOTE — PROGRESS NOTES
Pt is a 67 y/o WF here for right sided abd pain x2-3 months. Pain is pretty much constant daily. Sometimes notices pain worse w/ food. No particular alleviating factors. She has had a neg GB u/s recently and has a pending HIDA scan and surgical referral with Dr. An. Associated nausea. No vomiting. Intermittent diarrhea, hx constipation.   Colonoscopy 07/2019 w/ rec repeat 3 yrs.     Past Medical History:   Diagnosis Date    Anxiety state     BMI 32.0-32.9,adult     Bronchitis     CHF (congestive heart failure)     Cobalamin deficiency     Deep vein thrombosis     Depressive disorder     GERD (gastroesophageal reflux disease)     Hypertension     Hypothyroidism     Lower extremity edema     Neuropathy     Osteoarthritis of both knees     Pain in left leg     Pain in right leg     Peripheral edema     Pulmonary embolism     Varicose veins of bilateral lower extremities with pain     Vitamin D deficiency      Review of Systems   Constitutional:  Negative for chills and fever.   Respiratory:  Negative for shortness of breath.    Cardiovascular:  Negative for chest pain.   Gastrointestinal:  Positive for abdominal pain, constipation, diarrhea and nausea. Negative for blood in stool, melena and vomiting.   Skin:  Negative for rash.   Neurological:  Negative for weakness.     Physical Exam  Vitals reviewed. Exam conducted with a chaperone present.   Constitutional:       General: She is not in acute distress.     Appearance: Normal appearance.   HENT:      Head: Normocephalic.   Eyes:      General: No scleral icterus.     Pupils: Pupils are equal, round, and reactive to light.   Cardiovascular:      Rate and Rhythm: Normal rate.   Pulmonary:      Effort: Pulmonary effort is normal.   Abdominal:      General: There is no distension.      Palpations: Abdomen is soft.      Tenderness: There is abdominal tenderness. There is no guarding or rebound.      Comments: RUQ TTP   Skin:     General: Skin is warm  and dry.   Neurological:      General: No focal deficit present.      Mental Status: She is alert and oriented to person, place, and time. Mental status is at baseline.   Psychiatric:         Mood and Affect: Mood normal.         Behavior: Behavior normal.         Thought Content: Thought content normal.         Judgment: Judgment normal.     Plan  -HIDA  -KUB  -consider EGD if HIDA neg  -colonoscopy due  -RTC 3 months, sooner PRN

## 2022-11-03 ENCOUNTER — TELEPHONE (OUTPATIENT)
Dept: GASTROENTEROLOGY | Facility: CLINIC | Age: 67
End: 2022-11-03
Payer: MEDICARE

## 2022-11-03 NOTE — TELEPHONE ENCOUNTER
----- Message from Lucas Vasquez RN sent at 11/3/2022 11:03 AM CDT -----    ----- Message -----  From: JAMES Velasco  Sent: 10/31/2022   3:00 PM CDT  To: Holger Rosenberg LPN    Mild colonic stool. May consider Miralax 1 capful daily, diarrhea could be overflow or r/t GB.

## 2022-11-03 NOTE — TELEPHONE ENCOUNTER
Pt was notified of below and will try the suggestion of 1 capful of miralax, pt has followup on 11-17, voiced understanding.

## 2022-11-14 ENCOUNTER — HOSPITAL ENCOUNTER (OUTPATIENT)
Dept: RADIOLOGY | Facility: HOSPITAL | Age: 67
Discharge: HOME | End: 2022-11-14
Attending: NURSE PRACTITIONER
Payer: MEDICARE

## 2022-11-14 DIAGNOSIS — R10.10 UPPER ABDOMINAL PAIN, UNSPECIFIED: ICD-10-CM

## 2022-11-14 PROCEDURE — 78227 HEPATOBIL SYST IMAGE W/DRUG: CPT | Mod: 26,,, | Performed by: RADIOLOGY

## 2022-11-14 PROCEDURE — 78227 NM HEPATOBILIARY(HIDA) WITH PHARM AND EF: ICD-10-PCS | Mod: 26,,, | Performed by: RADIOLOGY

## 2022-11-14 PROCEDURE — A9537 TC99M MEBROFENIN: HCPCS

## 2022-11-23 ENCOUNTER — OFFICE VISIT (OUTPATIENT)
Dept: FAMILY MEDICINE | Facility: CLINIC | Age: 67
End: 2022-11-23
Payer: MEDICARE

## 2022-11-23 VITALS
TEMPERATURE: 98 F | WEIGHT: 204.81 LBS | SYSTOLIC BLOOD PRESSURE: 114 MMHG | OXYGEN SATURATION: 99 % | BODY MASS INDEX: 32.15 KG/M2 | HEIGHT: 67 IN | DIASTOLIC BLOOD PRESSURE: 78 MMHG | HEART RATE: 75 BPM

## 2022-11-23 DIAGNOSIS — L57.0 AK (ACTINIC KERATOSIS): ICD-10-CM

## 2022-11-23 DIAGNOSIS — N39.0 URINARY TRACT INFECTION WITHOUT HEMATURIA, SITE UNSPECIFIED: Primary | ICD-10-CM

## 2022-11-23 LAB
BILIRUB SERPL-MCNC: NEGATIVE MG/DL
BLOOD URINE, POC: ABNORMAL
COLOR, POC UA: YELLOW
GLUCOSE UR QL STRIP: NEGATIVE
KETONES UR QL STRIP: NEGATIVE
LEUKOCYTE ESTERASE URINE, POC: ABNORMAL
NITRITE, POC UA: POSITIVE
PH, POC UA: 6.5
PROTEIN, POC: ABNORMAL
SPECIFIC GRAVITY, POC UA: 1.01
UROBILINOGEN, POC UA: 0.2

## 2022-11-23 PROCEDURE — 99213 PR OFFICE/OUTPT VISIT, EST, LEVL III, 20-29 MIN: ICD-10-PCS | Mod: ,,, | Performed by: FAMILY MEDICINE

## 2022-11-23 PROCEDURE — 17000 DESTRUCT PREMALG LESION: CPT | Mod: ,,, | Performed by: FAMILY MEDICINE

## 2022-11-23 PROCEDURE — 81003 URINALYSIS AUTO W/O SCOPE: CPT | Mod: RHCUB | Performed by: FAMILY MEDICINE

## 2022-11-23 PROCEDURE — 96372 PR INJECTION,THERAP/PROPH/DIAG2ST, IM OR SUBCUT: ICD-10-PCS | Mod: ,,, | Performed by: FAMILY MEDICINE

## 2022-11-23 PROCEDURE — 99213 OFFICE O/P EST LOW 20 MIN: CPT | Mod: ,,, | Performed by: FAMILY MEDICINE

## 2022-11-23 PROCEDURE — 17000 DESTRUCTION, PREMALIGNANT LESION: ICD-10-PCS | Mod: ,,, | Performed by: FAMILY MEDICINE

## 2022-11-23 PROCEDURE — 96372 THER/PROPH/DIAG INJ SC/IM: CPT | Mod: ,,, | Performed by: FAMILY MEDICINE

## 2022-11-23 RX ORDER — SULFAMETHOXAZOLE AND TRIMETHOPRIM 800; 160 MG/1; MG/1
1 TABLET ORAL 2 TIMES DAILY
Qty: 14 TABLET | Refills: 0 | Status: SHIPPED | OUTPATIENT
Start: 2022-11-23 | End: 2023-08-08 | Stop reason: ALTCHOICE

## 2022-11-23 RX ORDER — CEFTRIAXONE 1 G/1
1 INJECTION, POWDER, FOR SOLUTION INTRAMUSCULAR; INTRAVENOUS
Status: COMPLETED | OUTPATIENT
Start: 2022-11-23 | End: 2022-11-23

## 2022-11-23 RX ADMIN — CEFTRIAXONE 1 G: 1 INJECTION, POWDER, FOR SOLUTION INTRAMUSCULAR; INTRAVENOUS at 11:11

## 2022-11-23 NOTE — PROGRESS NOTES
Tim Burgos MD   [facility]  [address] [place] [address]     PATIENT NAME: Victoria Delarosa  : 1955  DATE: 22  MRN: [id number]      Billing Provider: Tim Burgos MD  Level of Service: MO OFFICE/OUTPT VISIT, EST, LEVL III, 20-29 MIN  Patient PCP Information       Provider PCP Type    Tim Burgos MD General            Reason for Visit / Chief Complaint: Dysuria (Burning with concentrated urine with foul odor x 3 days. ) and Eye Pain (Left eye irritation, burning, and itching x 2-3 days. Use of Prednisone eye drops. Shingles back in .  )         History of Present Illness / Problem Focused Workflow     Victoira Delarosa presents to the clinic with Dysuria (Burning with concentrated urine with foul odor x 3 days. ) and Eye Pain (Left eye irritation, burning, and itching x 2-3 days. Use of Prednisone eye drops. Shingles back in .  )     HPI    Review of Systems     Review of Systems   Constitutional:  Negative for activity change, appetite change, fatigue and fever.   HENT:  Negative for nasal congestion, ear pain, hearing loss, sinus pressure/congestion and sore throat.    Respiratory:  Negative for cough, chest tightness and shortness of breath.    Cardiovascular:  Negative for chest pain and palpitations.   Gastrointestinal:  Negative for abdominal pain and fecal incontinence.   Genitourinary:  Positive for dysuria, flank pain and hematuria. Negative for bladder incontinence and difficulty urinating.   Musculoskeletal:  Negative for arthralgias.   Integumentary:  Negative for rash.   Neurological:  Negative for dizziness and headaches.      Medical / Social / Family History     Past Medical History:   Diagnosis Date    Anxiety state     BMI 32.0-32.9,adult     Bronchitis     CHF (congestive heart failure)     Cobalamin deficiency     Deep vein thrombosis     Depressive disorder     GERD (gastroesophageal reflux disease)      Hypertension     Hypothyroidism     Lower extremity edema     Neuropathy     Osteoarthritis of both knees     Pain in left leg     Pain in right leg     Peripheral edema     Pulmonary embolism     Varicose veins of bilateral lower extremities with pain     Vitamin D deficiency        Past Surgical History:   Procedure Laterality Date    BILATERAL TUBAL LIGATION      CARDIAC CATHETERIZATION      COLONOSCOPY      knee scope Left     THYROIDECTOMY, PARTIAL      TONSILLECTOMY, ADENOIDECTOMY      TOTAL KNEE ARTHROPLASTY Right     TOTAL KNEE ARTHROPLASTY Right     TUBAL LIGATION      VAGINAL DELIVERY      x 3       Social History  Victoria Delarosa  reports that she has never smoked. She has never used smokeless tobacco. She reports that she does not drink alcohol and does not use drugs.    Family History  Victoria Delarosa  family history includes Arthritis in her brother and sister; Heart disease in her brother; Stroke in her brother.    Medications and Allergies     Medications  Outpatient Medications Marked as Taking for the 11/23/22 encounter (Office Visit) with Tim Burgos MD   Medication Sig Dispense Refill    cyanocobalamin 1,000 mcg/mL injection INJECT 1 ML EVERY MONTH 1 mL 11    ezetimibe (ZETIA) 10 mg tablet Take 10 mg by mouth once daily.      fluticasone propionate (FLONASE) 50 mcg/actuation nasal spray by Each Nostril route once daily.      furosemide (LASIX) 20 MG tablet Take 1 tablet (20 mg total) by mouth once daily. 90 tablet 1    levothyroxine (SYNTHROID) 25 MCG tablet Take 1 tablet (25 mcg total) by mouth once daily. 90 tablet 1    losartan (COZAAR) 50 MG tablet Take 1 tablet (50 mg total) by mouth once daily. 90 tablet 1    magnesium oxide (MAG-OX) 400 mg (241.3 mg magnesium) tablet Take 1 tablet by mouth once daily.      omeprazole (PRILOSEC) 20 MG capsule Take 1 capsule (20 mg total) by mouth once daily. 90 capsule 1    oxybutynin (DITROPAN) 5 MG Tab Take 1 tablet (5 mg  total) by mouth 2 (two) times daily. 180 tablet 2    potassium chloride (KLOR-CON) 10 MEQ TbSR Take 1 tablet (10 mEq total) by mouth once daily. 90 tablet 1    prednisoLONE acetate (PRED FORTE) 1 % DrpS Place 1 drop into the left eye once daily.      ranolazine (RANEXA) 500 MG Tb12 Take 500 mg by mouth 2 (two) times daily.      warfarin (COUMADIN) 5 MG tablet Take 5 mg by mouth Daily.       Current Facility-Administered Medications for the 11/23/22 encounter (Office Visit) with Tim Burgos MD   Medication Dose Route Frequency Provider Last Rate Last Admin    cefTRIAXone injection 1 g  1 g Intramuscular 1 time in Clinic/HOD Tim Burgos MD           Allergies  Review of patient's allergies indicates:   Allergen Reactions    Lisinopril      Cough      Statins-hmg-coa reductase inhibitors Hives and Itching       Physical Examination     Vitals:    11/23/22 1030   BP: 114/78   Pulse: 75   Temp: 97.9 °F (36.6 °C)     Physical Exam  Constitutional:       General: She is not in acute distress.     Appearance: She is not ill-appearing.   HENT:      Head: Normocephalic and atraumatic.      Right Ear: Tympanic membrane and ear canal normal.      Left Ear: Tympanic membrane and ear canal normal.      Nose: Nose normal. No congestion or rhinorrhea.   Eyes:      Pupils: Pupils are equal, round, and reactive to light.   Cardiovascular:      Rate and Rhythm: Normal rate and regular rhythm.      Pulses: Normal pulses.      Heart sounds: No murmur heard.  Pulmonary:      Effort: No respiratory distress.      Breath sounds: No wheezing, rhonchi or rales.   Abdominal:      General: Bowel sounds are normal.      Palpations: Abdomen is soft.      Tenderness: There is abdominal tenderness.      Hernia: No hernia is present.   Musculoskeletal:      Cervical back: Normal range of motion and neck supple.   Lymphadenopathy:      Cervical: No cervical adenopathy.   Skin:     General: Skin is warm and dry.       "Findings: Lesion (5mm dry scaley lesion left post neck) present.   Neurological:      Mental Status: She is alert.   Psychiatric:         Behavior: Behavior normal.         Thought Content: Thought content normal.        Assessment and Plan (including Health Maintenance)   :    Plan:         Health Maintenance Due   Topic Date Due    Hepatitis C Screening  Never done    DEXA Scan  06/14/2021    COVID-19 Vaccine (4 - Booster for Moderna series) 02/25/2022    Colorectal Cancer Screening  07/16/2022    Influenza Vaccine (1) 09/01/2022       Problem List Items Addressed This Visit    None  Visit Diagnoses       Urinary tract infection without hematuria, site unspecified    -  Primary    Relevant Medications    cefTRIAXone injection 1 g (Start on 11/23/2022 11:15 AM)    Other Relevant Orders    POCT URINALYSIS W/O SCOPE (Completed)          Urinary tract infection without hematuria, site unspecified  -     POCT URINALYSIS W/O SCOPE  -     cefTRIAXone injection 1 g    Other orders  -     sulfamethoxazole-trimethoprim 800-160mg (BACTRIM DS) 800-160 mg Tab; Take 1 tablet by mouth 2 (two) times daily.  Dispense: 14 tablet; Refill: 0       Health Maintenance Topics with due status: Not Due       Topic Last Completion Date    Mammogram 02/23/2022    Lipid Panel 09/08/2022       Destruction, Premalignant Lesion    Date/Time: 11/23/2022 7:20 AM  Performed by: Tim Burgos MD  Authorized by: Tim Burgos MD     Consent Done?:  Yes (Verbal)  Time out: Immediately prior to procedure a "time out" was called to verify the correct patient, procedure, equipment, support staff and site/side marked as required.      Indications:  Actinic keratosis    Location(s):  Head/Neck:  Neck    Number of lesions:  1  Destruction method:  Cryotherapy   Patient was discharged and will follow up for wound check.     Future Appointments   Date Time Provider Department Center   11/28/2022 10:00 AM [facility] GI ROOM 02 LEFTY GALLEGOS " [facility]   12/7/2022  1:00 PM [facility] GI ROOM 02 RASCH ENDO [facility]   2/1/2023  8:00 AM JAMES Velasco [facility] GASTR [facility]   3/29/2023 11:00 AM [facility] MAMMO1 [facility] MAMMO [facility]   6/13/2023  1:00 PM JAMES Pandya [facility] ROHAN Rain        No follow-ups on file.       Signature:  Tim Burgos MD  [facility]  [address] [facility] [place] [address]  [phone] Phone  [phone] Fax    Date of encounter: 11/23/22

## 2022-11-28 ENCOUNTER — ANESTHESIA (OUTPATIENT)
Dept: GASTROENTEROLOGY | Facility: HOSPITAL | Age: 67
End: 2022-11-28
Payer: MEDICARE

## 2022-11-28 ENCOUNTER — HOSPITAL ENCOUNTER (OUTPATIENT)
Dept: GASTROENTEROLOGY | Facility: HOSPITAL | Age: 67
Discharge: HOME | End: 2022-11-28
Attending: NURSE PRACTITIONER
Payer: MEDICARE

## 2022-11-28 ENCOUNTER — ANESTHESIA EVENT (OUTPATIENT)
Dept: GASTROENTEROLOGY | Facility: HOSPITAL | Age: 67
End: 2022-11-28
Payer: MEDICARE

## 2022-11-28 VITALS
WEIGHT: 200 LBS | BODY MASS INDEX: 31.32 KG/M2 | TEMPERATURE: 98 F | SYSTOLIC BLOOD PRESSURE: 131 MMHG | RESPIRATION RATE: 21 BRPM | DIASTOLIC BLOOD PRESSURE: 71 MMHG | OXYGEN SATURATION: 96 % | HEART RATE: 58 BPM

## 2022-11-28 DIAGNOSIS — Z86.010 HISTORY OF COLON POLYPS: ICD-10-CM

## 2022-11-28 DIAGNOSIS — K57.30 COLON, DIVERTICULOSIS: ICD-10-CM

## 2022-11-28 DIAGNOSIS — Z12.11 SCREENING FOR COLON CANCER: Primary | ICD-10-CM

## 2022-11-28 DIAGNOSIS — K62.1 POLYP OF RECTUM: ICD-10-CM

## 2022-11-28 PROBLEM — Z86.0100 HISTORY OF COLON POLYPS: Status: ACTIVE | Noted: 2022-11-28

## 2022-11-28 PROCEDURE — 63600175 PHARM REV CODE 636 W HCPCS: Performed by: NURSE ANESTHETIST, CERTIFIED REGISTERED

## 2022-11-28 PROCEDURE — D9220A PRA ANESTHESIA: ICD-10-PCS | Mod: ,,, | Performed by: NURSE ANESTHETIST, CERTIFIED REGISTERED

## 2022-11-28 PROCEDURE — 88305 SURGICAL PATHOLOGY: ICD-10-PCS | Mod: 26,,, | Performed by: PATHOLOGY

## 2022-11-28 PROCEDURE — 27201423 OPTIME MED/SURG SUP & DEVICES STERILE SUPPLY

## 2022-11-28 PROCEDURE — 37000009 HC ANESTHESIA EA ADD 15 MINS

## 2022-11-28 PROCEDURE — 88305 TISSUE EXAM BY PATHOLOGIST: CPT | Mod: 26,,, | Performed by: PATHOLOGY

## 2022-11-28 PROCEDURE — 25000003 PHARM REV CODE 250: Performed by: NURSE ANESTHETIST, CERTIFIED REGISTERED

## 2022-11-28 PROCEDURE — 88305 TISSUE EXAM BY PATHOLOGIST: CPT | Mod: TC,SUR | Performed by: INTERNAL MEDICINE

## 2022-11-28 PROCEDURE — 37000008 HC ANESTHESIA 1ST 15 MINUTES

## 2022-11-28 PROCEDURE — D9220A PRA ANESTHESIA: Mod: ,,, | Performed by: NURSE ANESTHETIST, CERTIFIED REGISTERED

## 2022-11-28 PROCEDURE — 27000284 HC CANNULA NASAL: Performed by: NURSE ANESTHETIST, CERTIFIED REGISTERED

## 2022-11-28 PROCEDURE — 45380 COLONOSCOPY AND BIOPSY: CPT | Mod: PT | Performed by: INTERNAL MEDICINE

## 2022-11-28 RX ORDER — LIDOCAINE HYDROCHLORIDE 20 MG/ML
INJECTION, SOLUTION EPIDURAL; INFILTRATION; INTRACAUDAL; PERINEURAL
Status: DISCONTINUED | OUTPATIENT
Start: 2022-11-28 | End: 2022-11-28

## 2022-11-28 RX ORDER — PROPOFOL 10 MG/ML
VIAL (ML) INTRAVENOUS
Status: DISCONTINUED | OUTPATIENT
Start: 2022-11-28 | End: 2022-11-28

## 2022-11-28 RX ORDER — SODIUM CHLORIDE 0.9 % (FLUSH) 0.9 %
10 SYRINGE (ML) INJECTION
Status: DISCONTINUED | OUTPATIENT
Start: 2022-11-28 | End: 2022-11-29 | Stop reason: HOSPADM

## 2022-11-28 RX ORDER — SODIUM CHLORIDE 9 MG/ML
INJECTION, SOLUTION INTRAVENOUS CONTINUOUS PRN
Status: DISCONTINUED | OUTPATIENT
Start: 2022-11-28 | End: 2022-11-28

## 2022-11-28 RX ORDER — SODIUM CHLORIDE 0.9 % (FLUSH) 0.9 %
10 SYRINGE (ML) INJECTION
Status: CANCELLED | OUTPATIENT
Start: 2022-11-28

## 2022-11-28 RX ADMIN — PROPOFOL 50 MG: 10 INJECTION, EMULSION INTRAVENOUS at 11:11

## 2022-11-28 RX ADMIN — LIDOCAINE HYDROCHLORIDE 50 MG: 20 INJECTION, SOLUTION EPIDURAL; INFILTRATION; INTRACAUDAL; PERINEURAL at 11:11

## 2022-11-28 RX ADMIN — SODIUM CHLORIDE: 9 INJECTION, SOLUTION INTRAVENOUS at 11:11

## 2022-11-28 NOTE — ANESTHESIA POSTPROCEDURE EVALUATION
Anesthesia Post Evaluation    Patient: Victoria Delarosa    Procedure(s) Performed: * No procedures listed *    Final Anesthesia Type: general      Patient location during evaluation: GI PACU  Patient participation: Yes- Able to Participate  Level of consciousness: awake and alert  Post-procedure vital signs: reviewed and stable  Pain management: adequate  Airway patency: patent  KATHY mitigation strategies: Multimodal analgesia  PONV status at discharge: No PONV  Anesthetic complications: no      Cardiovascular status: blood pressure returned to baseline  Respiratory status: unassisted  Hydration status: euvolemic  Follow-up not needed.          Vitals Value Taken Time   /71 11/28/22 1200   Temp 36.7 °C (98 °F) 11/28/22 1128   Pulse 64 11/28/22 1200   Resp 17 11/28/22 1200   SpO2 96 % 11/28/22 1200   Vitals shown include unvalidated device data.      Event Time   Out of Recovery 12:12:00         Pain/Mira Score: Mira Score: 8 (11/28/2022 11:35 AM)

## 2022-11-28 NOTE — TRANSFER OF CARE
Anesthesia Transfer of Care Note    Patient: Victoria Delarosa    Procedure(s) Performed: * No procedures listed *    Patient location: GI    Anesthesia Type: general    Transport from OR: Transported from OR on room air with adequate spontaneous ventilation. Continuous ECG monitoring in transport. Continuous SpO2 monitoring in transport    Post pain: adequate analgesia    Post assessment: no apparent anesthetic complications    Post vital signs: stable    Level of consciousness: responds to stimulation    Nausea/Vomiting: no nausea/vomiting    Complications: none    Transfer of care protocol was followed      Last vitals:   Visit Vitals  /68   Pulse 69   Temp 36.7 °C (98 °F)   Resp 15   Wt 90.7 kg (200 lb)   SpO2 (!) 94%   Breastfeeding No   BMI 31.32 kg/m²

## 2022-11-28 NOTE — ANESTHESIA PREPROCEDURE EVALUATION
11/28/2022  Victoria Delarosa is a 66 y.o., female.      Pre-op Assessment    I have reviewed the Patient Summary Reports.     I have reviewed the Nursing Notes. I have reviewed the NPO Status.   I have reviewed the Medications.     Review of Systems  Anesthesia Hx:  No problems with previous Anesthesia  Family Hx of Anesthesia complications:  Personal Hx of Anesthesia complications   Social:  Non-Smoker    Hematology/Oncology:  Hematology Normal   Oncology Normal     EENT/Dental:EENT/Dental Normal   Cardiovascular:   Hypertension Past MI  CHF    Pulmonary:  Pulmonary Normal    Renal/:  Renal/ Normal     Hepatic/GI:   GERD    Musculoskeletal:   Arthritis     Neurological:   CVA    Endocrine:   Hypothyroidism  Obesity / BMI > 30  Dermatological:  Skin Normal    Psych:   Psychiatric History depression          Physical Exam  General: Well nourished, Cooperative, Alert and Oriented    Airway:  Mallampati: II   Mouth Opening: Normal  TM Distance: Normal  Tongue: Normal  Neck ROM: Normal ROM    Chest/Lungs:  Clear to auscultation, Normal Respiratory Rate    Heart:  Rate: Normal  Rhythm: Regular Rhythm    Abdomen:  Normal, Soft        Anesthesia Plan  Type of Anesthesia, risks & benefits discussed:    Anesthesia Type: Gen Natural Airway  Intra-op Monitoring Plan: Standard ASA Monitors  Post Op Pain Control Plan: multimodal analgesia  Induction:  IV  Informed Consent: Informed consent signed with the Patient and all parties understand the risks and agree with anesthesia plan.  All questions answered. Patient consented to blood products? Yes  ASA Score: 3    Ready For Surgery From Anesthesia Perspective.     .

## 2022-11-28 NOTE — DISCHARGE INSTRUCTIONS
Procedure Date  11/28/22     Impression  Overall Impression: Diverticula were present in the sigmoid and descending colon. Internal hemorrhoids were present without bleeding. One diminutive polyp was removed with biopsy from the rectum.     Recommendation    Await pathology results     Repeat colonoscopy in 5 years      High fiber diet  Disp: DC to home in stable condition. Resume diet and activity, no driving x 24 hours. F/U with PCP as scheduled. Dx: History of colon polyps, colon diverticulosis, rectum polyp       NO DRIVING, OPERATING EQUIPMENT, OR SIGNING LEGAL DOCUMENTS FOR 24 HOURS.  THE NURSE WILL CALL YOU WITH YOUR BIOPSY RESULTS IN A FEW DAYS.

## 2022-11-28 NOTE — H&P
[facility]  Gastroenterology  H&P    Patient Name: Victoria Delarosa  MRN: [id number]  Admission Date: 11/28/2022  Code Status: No Order    Attending Provider: JAMES Velasco   Primary Care Physician: Tim Burgos MD  Principal Problem:<principal problem not specified>    Subjective:     History of Present Illness: Pt has history of colon polyps and c/o low abdominal pain. Her last colonoscopy was 3 years ago.    Past Medical History:   Diagnosis Date    Anxiety state     BMI 32.0-32.9,adult     Bronchitis     CHF (congestive heart failure)     Cobalamin deficiency     Deep vein thrombosis     Depressive disorder     General anesthetics causing adverse effect in therapeutic use     GERD (gastroesophageal reflux disease)     Heart attack     20 yrs ago    Hypertension     Hypothyroidism     Lower extremity edema     Neuropathy     Osteoarthritis of both knees     Pain in left leg     Pain in right leg     Peripheral edema     Pulmonary embolism     Stroke     Varicose veins of bilateral lower extremities with pain     Vitamin D deficiency        Past Surgical History:   Procedure Laterality Date    BILATERAL TUBAL LIGATION      CARDIAC CATHETERIZATION      COLONOSCOPY      knee scope Left     THYROIDECTOMY, PARTIAL      TONSILLECTOMY, ADENOIDECTOMY      TOTAL KNEE ARTHROPLASTY Right     TOTAL KNEE ARTHROPLASTY Right     TUBAL LIGATION      VAGINAL DELIVERY      x 3       Review of patient's allergies indicates:   Allergen Reactions    Lisinopril      Cough      Statins-hmg-coa reductase inhibitors Hives and Itching     Family History       Problem Relation (Age of Onset)    Arthritis Sister, Brother    Heart disease Brother    Stroke Brother          Tobacco Use    Smoking status: Never    Smokeless tobacco: Never   Substance and Sexual Activity    Alcohol use: Never    Drug use: Never    Sexual activity: Yes     Partners: Male     Birth control/protection: Post-menopausal, Surgical     Comment: tubal      Review of Systems   Respiratory: Negative.     Cardiovascular: Negative.    Gastrointestinal:  Positive for abdominal pain.   Objective:     Vital Signs (Most Recent):    Vital Signs (24h Range):           There is no height or weight on file to calculate BMI.    No intake or output data in the 24 hours ending 11/28/22 1058    Lines/Drains/Airways       None                   Physical Exam  Vitals reviewed.   Constitutional:       General: She is not in acute distress.     Appearance: Normal appearance. She is well-developed. She is not ill-appearing.   HENT:      Head: Normocephalic and atraumatic.      Nose: Nose normal.   Eyes:      Pupils: Pupils are equal, round, and reactive to light.   Cardiovascular:      Rate and Rhythm: Normal rate and regular rhythm.   Pulmonary:      Effort: Pulmonary effort is normal.      Breath sounds: Normal breath sounds. No wheezing.   Abdominal:      General: Abdomen is flat. Bowel sounds are normal. There is no distension.      Palpations: Abdomen is soft.      Tenderness: There is no abdominal tenderness. There is no guarding.   Skin:     General: Skin is warm and dry.      Coloration: Skin is not jaundiced.   Neurological:      Mental Status: She is alert.   Psychiatric:         Attention and Perception: Attention normal.         Mood and Affect: Affect normal.         Speech: Speech normal.         Behavior: Behavior is cooperative.      Comments: Pt was calm while speaking.       Significant Labs:  CBC: No results for input(s): WBC, HGB, HCT, PLT in the last 48 hours.  CMP: No results for input(s): GLU, CALCIUM, ALBUMIN, PROT, NA, K, CO2, CL, BUN, CREATININE, ALKPHOS, ALT, AST, BILITOT in the last 48 hours.    Significant Imaging:  Imaging results within the past 24 hours have been reviewed.    Assessment/Plan:     There are no hospital problems to display for this patient.        Imp: history of colon polyps  Plan: colonoscopy    Preet Rodriguez,  MD  Gastroenterology  [facility] End[facility]

## 2022-11-29 LAB
ESTROGEN SERPL-MCNC: NORMAL PG/ML
INSULIN SERPL-ACNC: NORMAL U[IU]/ML
LAB AP GROSS DESCRIPTION: NORMAL
LAB AP LABORATORY NOTES: NORMAL
T3RU NFR SERPL: NORMAL %

## 2022-11-30 ENCOUNTER — TELEPHONE (OUTPATIENT)
Dept: GASTROENTEROLOGY | Facility: CLINIC | Age: 67
End: 2022-11-30
Payer: MEDICARE

## 2022-11-30 NOTE — TELEPHONE ENCOUNTER
Your colonoscopy pathology results show NO cancer or infections. Dr. Rodriguez removed a  polyp, so he recommends repeating your colonoscopy in 5 years. Please continue to follow recommendations given after your colonoscopy and call our office with any questions or concerns.      ----- Message from Preet Rodriguez MD sent at 11/29/2022  3:14 PM CST -----  Place pt on list for colonoscopy in 5 years for hx of polyps. This polyp was hyperplastic.

## 2022-12-02 ENCOUNTER — OFFICE VISIT (OUTPATIENT)
Dept: FAMILY MEDICINE | Facility: CLINIC | Age: 67
End: 2022-12-02
Payer: MEDICARE

## 2022-12-02 VITALS
DIASTOLIC BLOOD PRESSURE: 88 MMHG | SYSTOLIC BLOOD PRESSURE: 126 MMHG | TEMPERATURE: 98 F | WEIGHT: 206 LBS | BODY MASS INDEX: 32.33 KG/M2 | HEIGHT: 67 IN | HEART RATE: 66 BPM | OXYGEN SATURATION: 98 %

## 2022-12-02 DIAGNOSIS — J01.00 ACUTE NON-RECURRENT MAXILLARY SINUSITIS: Primary | ICD-10-CM

## 2022-12-02 PROCEDURE — 96372 PR INJECTION,THERAP/PROPH/DIAG2ST, IM OR SUBCUT: ICD-10-PCS | Mod: ,,, | Performed by: FAMILY MEDICINE

## 2022-12-02 PROCEDURE — 99213 OFFICE O/P EST LOW 20 MIN: CPT | Mod: ,,, | Performed by: FAMILY MEDICINE

## 2022-12-02 PROCEDURE — 99213 PR OFFICE/OUTPT VISIT, EST, LEVL III, 20-29 MIN: ICD-10-PCS | Mod: ,,, | Performed by: FAMILY MEDICINE

## 2022-12-02 PROCEDURE — 96372 THER/PROPH/DIAG INJ SC/IM: CPT | Mod: ,,, | Performed by: FAMILY MEDICINE

## 2022-12-02 RX ORDER — DEXAMETHASONE SODIUM PHOSPHATE 4 MG/ML
4 INJECTION, SOLUTION INTRA-ARTICULAR; INTRALESIONAL; INTRAMUSCULAR; INTRAVENOUS; SOFT TISSUE
Status: COMPLETED | OUTPATIENT
Start: 2022-12-02 | End: 2022-12-02

## 2022-12-02 RX ORDER — CEFTRIAXONE 1 G/1
1 INJECTION, POWDER, FOR SOLUTION INTRAMUSCULAR; INTRAVENOUS
Status: COMPLETED | OUTPATIENT
Start: 2022-12-02 | End: 2022-12-02

## 2022-12-02 RX ORDER — CODEINE PHOSPHATE AND GUAIFENESIN 10; 100 MG/5ML; MG/5ML
5 SOLUTION ORAL EVERY 4 HOURS PRN
COMMUNITY
End: 2022-12-02 | Stop reason: SDUPTHER

## 2022-12-02 RX ORDER — CODEINE PHOSPHATE AND GUAIFENESIN 10; 100 MG/5ML; MG/5ML
5 SOLUTION ORAL EVERY 4 HOURS PRN
Qty: 120 ML | Refills: 0 | Status: SHIPPED | OUTPATIENT
Start: 2022-12-02 | End: 2023-08-08 | Stop reason: ALTCHOICE

## 2022-12-02 RX ORDER — AZITHROMYCIN 250 MG/1
TABLET, FILM COATED ORAL
Qty: 6 TABLET | Refills: 0 | Status: SHIPPED | OUTPATIENT
Start: 2022-12-02 | End: 2023-08-08 | Stop reason: ALTCHOICE

## 2022-12-02 RX ORDER — METHYLPREDNISOLONE ACETATE 80 MG/ML
40 INJECTION, SUSPENSION INTRA-ARTICULAR; INTRALESIONAL; INTRAMUSCULAR; SOFT TISSUE
Status: COMPLETED | OUTPATIENT
Start: 2022-12-02 | End: 2022-12-02

## 2022-12-02 RX ADMIN — CEFTRIAXONE 1 G: 1 INJECTION, POWDER, FOR SOLUTION INTRAMUSCULAR; INTRAVENOUS at 10:12

## 2022-12-02 RX ADMIN — DEXAMETHASONE SODIUM PHOSPHATE 4 MG: 4 INJECTION, SOLUTION INTRA-ARTICULAR; INTRALESIONAL; INTRAMUSCULAR; INTRAVENOUS; SOFT TISSUE at 10:12

## 2022-12-02 RX ADMIN — METHYLPREDNISOLONE ACETATE 40 MG: 80 INJECTION, SUSPENSION INTRA-ARTICULAR; INTRALESIONAL; INTRAMUSCULAR; SOFT TISSUE at 10:12

## 2022-12-02 NOTE — PROGRESS NOTES
Tim Burgos MD   [facility]  [address] [place] [address]     PATIENT NAME: Victoria Delarosa  : 1955  DATE: 22  MRN: [id number]      Billing Provider: Tim Burgos MD  Level of Service:   Patient PCP Information       Provider PCP Type    Tim Burgos MD General            Reason for Visit / Chief Complaint: Sinus Problem (Cough, nasal congestion, sore throat and fever since Tuesday. Requesting refill on Cheritussin AC.)         History of Present Illness / Problem Focused Workflow     Victoria Delarosa presents to the clinic with Sinus Problem (Cough, nasal congestion, sore throat and fever since Tuesday. Requesting refill on Cheritussin AC.)     HPI    Review of Systems     Review of Systems   Constitutional:  Negative for activity change, appetite change, fatigue and fever.   HENT:  Positive for nasal congestion, sinus pressure/congestion and sore throat. Negative for ear pain and hearing loss.    Respiratory:  Positive for cough. Negative for chest tightness and shortness of breath.    Cardiovascular:  Negative for chest pain and palpitations.   Gastrointestinal:  Negative for abdominal pain and fecal incontinence.   Genitourinary:  Negative for bladder incontinence and difficulty urinating.   Musculoskeletal:  Negative for arthralgias.   Integumentary:  Negative for rash.   Neurological:  Negative for dizziness and headaches.      Medical / Social / Family History     Past Medical History:   Diagnosis Date    Anxiety state     BMI 32.0-32.9,adult     Bronchitis     CHF (congestive heart failure)     Cobalamin deficiency     Deep vein thrombosis     Depressive disorder     General anesthetics causing adverse effect in therapeutic use     GERD (gastroesophageal reflux disease)     Heart attack     20 yrs ago    Hypertension     Hypothyroidism     Lower extremity edema     Neuropathy     Osteoarthritis of both knees     Pain in left  leg     Pain in right leg     Peripheral edema     Pulmonary embolism     Stroke     Varicose veins of bilateral lower extremities with pain     Vitamin D deficiency        Past Surgical History:   Procedure Laterality Date    BILATERAL TUBAL LIGATION      CARDIAC CATHETERIZATION      COLONOSCOPY      knee scope Left     THYROIDECTOMY, PARTIAL      TONSILLECTOMY, ADENOIDECTOMY      TOTAL KNEE ARTHROPLASTY Right     TOTAL KNEE ARTHROPLASTY Right     TUBAL LIGATION      VAGINAL DELIVERY      x 3       Social History  Victoria Delarosa  reports that she has never smoked. She has never used smokeless tobacco. She reports that she does not drink alcohol and does not use drugs.    Family History  Victoria Delarosa  family history includes Arthritis in her brother and sister; Heart disease in her brother; Stroke in her brother.    Medications and Allergies     Medications  Outpatient Medications Marked as Taking for the 12/2/22 encounter (Office Visit) with Tim Burgos MD   Medication Sig Dispense Refill    guaiFENesin-codeine 100-10 mg/5 ml (TUSSI-ORGANIDIN NR)  mg/5 mL syrup Take 5 mLs by mouth every 4 (four) hours as needed for Cough.         Allergies  Review of patient's allergies indicates:   Allergen Reactions    Lisinopril      Cough      Statins-hmg-coa reductase inhibitors Hives and Itching       Physical Examination     Vitals:    12/02/22 1036   BP: 126/88   Pulse: 66   Temp: 98 °F (36.7 °C)     Physical Exam  Constitutional:       Appearance: Normal appearance.   HENT:      Head: Normocephalic and atraumatic.      Right Ear: Tympanic membrane normal.      Left Ear: Tympanic membrane normal.      Nose: Congestion and rhinorrhea present.      Mouth/Throat:      Pharynx: Posterior oropharyngeal erythema present.   Eyes:      Pupils: Pupils are equal, round, and reactive to light.   Cardiovascular:      Rate and Rhythm: Normal rate and regular rhythm.      Pulses: Normal pulses.       Heart sounds: Normal heart sounds.   Pulmonary:      Breath sounds: No wheezing or rhonchi.   Abdominal:      Palpations: Abdomen is soft.   Lymphadenopathy:      Cervical: Cervical adenopathy present.   Skin:     General: Skin is warm and dry.   Neurological:      Mental Status: She is alert.        Assessment and Plan (including Health Maintenance)   :    Plan:         Health Maintenance Due   Topic Date Due    Hepatitis C Screening  Never done    DEXA Scan  06/14/2021    COVID-19 Vaccine (4 - Booster for Moderna series) 02/25/2022    Influenza Vaccine (1) 09/01/2022       Problem List Items Addressed This Visit    None    There are no diagnoses linked to this encounter.   Health Maintenance Topics with due status: Not Due       Topic Last Completion Date    Mammogram 02/23/2022    Lipid Panel 09/08/2022    Colorectal Cancer Screening 11/28/2022       Procedures     Future Appointments   Date Time Provider Department Center   12/7/2022  1:00 PM [facility] GI ROOM 02 [facility]   2/1/2023  8:00 AM JAMES Velasco [facility] GASTR [facility]   3/29/2023 11:00 AM [facility] MAMMO1 [facility]   6/13/2023  1:00 PM JAMES Pandya [facility] ROHAN Rain        No follow-ups on file.       Signature:  Tim Burgos MD  [facility]  [address] [facility]   [place] [address]  [phone] Phone  [phone] Fax    Date of encounter: 12/2/22

## 2022-12-07 ENCOUNTER — HOSPITAL ENCOUNTER (OUTPATIENT)
Dept: GASTROENTEROLOGY | Facility: HOSPITAL | Age: 67
Discharge: HOME | End: 2022-12-07
Attending: NURSE PRACTITIONER
Payer: MEDICARE

## 2022-12-07 ENCOUNTER — ANESTHESIA (OUTPATIENT)
Dept: GASTROENTEROLOGY | Facility: HOSPITAL | Age: 67
End: 2022-12-07
Payer: MEDICARE

## 2022-12-07 ENCOUNTER — ANESTHESIA EVENT (OUTPATIENT)
Dept: GASTROENTEROLOGY | Facility: HOSPITAL | Age: 67
End: 2022-12-07
Payer: MEDICARE

## 2022-12-07 VITALS
HEIGHT: 67 IN | WEIGHT: 196 LBS | BODY MASS INDEX: 30.76 KG/M2 | RESPIRATION RATE: 15 BRPM | SYSTOLIC BLOOD PRESSURE: 152 MMHG | OXYGEN SATURATION: 95 % | DIASTOLIC BLOOD PRESSURE: 91 MMHG | HEART RATE: 82 BPM

## 2022-12-07 DIAGNOSIS — R11.0 NAUSEA: ICD-10-CM

## 2022-12-07 DIAGNOSIS — D3A.8 BENIGN NEUROENDOCRINE TUMOR OF STOMACH: ICD-10-CM

## 2022-12-07 DIAGNOSIS — R19.7 DIARRHEA, UNSPECIFIED TYPE: ICD-10-CM

## 2022-12-07 DIAGNOSIS — K29.00 ACUTE SUPERFICIAL GASTRITIS WITHOUT HEMORRHAGE: Primary | ICD-10-CM

## 2022-12-07 DIAGNOSIS — R10.11 RIGHT UPPER QUADRANT ABDOMINAL PAIN: ICD-10-CM

## 2022-12-07 PROCEDURE — D9220A PRA ANESTHESIA: Mod: ,,, | Performed by: NURSE ANESTHETIST, CERTIFIED REGISTERED

## 2022-12-07 PROCEDURE — 37000008 HC ANESTHESIA 1ST 15 MINUTES

## 2022-12-07 PROCEDURE — 88305 SURGICAL PATHOLOGY: ICD-10-PCS | Mod: 26,XU,, | Performed by: PATHOLOGY

## 2022-12-07 PROCEDURE — 88305 TISSUE EXAM BY PATHOLOGIST: CPT | Mod: TC,SUR,59 | Performed by: INTERNAL MEDICINE

## 2022-12-07 PROCEDURE — D9220A PRA ANESTHESIA: ICD-10-PCS | Mod: ,,, | Performed by: NURSE ANESTHETIST, CERTIFIED REGISTERED

## 2022-12-07 PROCEDURE — 88360 TUMOR IMMUNOHISTOCHEM/MANUAL: CPT | Mod: 26,GZ,, | Performed by: PATHOLOGY

## 2022-12-07 PROCEDURE — 63600175 PHARM REV CODE 636 W HCPCS: Performed by: NURSE ANESTHETIST, CERTIFIED REGISTERED

## 2022-12-07 PROCEDURE — 88360 SURGICAL PATHOLOGY: ICD-10-PCS | Mod: 26,GZ,, | Performed by: PATHOLOGY

## 2022-12-07 PROCEDURE — 43239 EGD BIOPSY SINGLE/MULTIPLE: CPT | Performed by: INTERNAL MEDICINE

## 2022-12-07 PROCEDURE — 88341 IMHCHEM/IMCYTCHM EA ADD ANTB: CPT | Mod: 26,GZ,59, | Performed by: PATHOLOGY

## 2022-12-07 PROCEDURE — 88342 SURGICAL PATHOLOGY: ICD-10-PCS | Mod: 26,XU,, | Performed by: PATHOLOGY

## 2022-12-07 PROCEDURE — 88305 TISSUE EXAM BY PATHOLOGIST: CPT | Mod: 26,XU,, | Performed by: PATHOLOGY

## 2022-12-07 PROCEDURE — 88341 SURGICAL PATHOLOGY: ICD-10-PCS | Mod: 26,GZ,59, | Performed by: PATHOLOGY

## 2022-12-07 PROCEDURE — 88342 IMHCHEM/IMCYTCHM 1ST ANTB: CPT | Mod: 26,XU,, | Performed by: PATHOLOGY

## 2022-12-07 PROCEDURE — 25000003 PHARM REV CODE 250: Performed by: NURSE ANESTHETIST, CERTIFIED REGISTERED

## 2022-12-07 RX ORDER — SODIUM CHLORIDE 0.9 % (FLUSH) 0.9 %
10 SYRINGE (ML) INJECTION
Status: DISCONTINUED | OUTPATIENT
Start: 2022-12-07 | End: 2022-12-08 | Stop reason: HOSPADM

## 2022-12-07 RX ORDER — PROPOFOL 10 MG/ML
INJECTION, EMULSION INTRAVENOUS
Status: DISCONTINUED | OUTPATIENT
Start: 2022-12-07 | End: 2022-12-07

## 2022-12-07 RX ORDER — LIDOCAINE HYDROCHLORIDE 20 MG/ML
INJECTION, SOLUTION EPIDURAL; INFILTRATION; INTRACAUDAL; PERINEURAL
Status: DISCONTINUED | OUTPATIENT
Start: 2022-12-07 | End: 2022-12-07

## 2022-12-07 RX ORDER — PANTOPRAZOLE SODIUM 40 MG/1
40 TABLET, DELAYED RELEASE ORAL DAILY
Qty: 30 TABLET | Refills: 11 | Status: SHIPPED | OUTPATIENT
Start: 2022-12-07 | End: 2023-08-08

## 2022-12-07 RX ADMIN — SODIUM CHLORIDE: 9 INJECTION, SOLUTION INTRAVENOUS at 02:12

## 2022-12-07 RX ADMIN — PROPOFOL 30 MG: 10 INJECTION, EMULSION INTRAVENOUS at 02:12

## 2022-12-07 RX ADMIN — PROPOFOL 70 MG: 10 INJECTION, EMULSION INTRAVENOUS at 02:12

## 2022-12-07 RX ADMIN — LIDOCAINE HYDROCHLORIDE 50 MG: 20 INJECTION, SOLUTION INTRAVENOUS at 02:12

## 2022-12-07 NOTE — DISCHARGE INSTRUCTIONS
Procedure Date  12/7/22     Impression  Overall Impression: Mucosa of the esophagus is normal, overlying a 3cm hiatus hernia. The distal esophagus was biopsied. The stomach has erythema and polypoid inflammation in the fundus, biopsies were obtained. Mucosa of the duodenum was normal, biopsies were obtained due to nausea, ruq pain.     Recommendation    Await pathology results      Avoid nsaids, keep appt (or re-schedule) HIDA with [facility]; PPI 1/AM  Disp: DC to home in stable condition. Resume diet and activity. No driving x 24 hours. F/U with PCP as scheduled. Dx: nausea, gastritis, ruq pain     Indication  Nausea, Right upper quadrant abdominal pain, Diarrhea, unspecified type

## 2022-12-07 NOTE — ANESTHESIA PREPROCEDURE EVALUATION
12/07/2022  Victoria Delarosa is a 66 y.o., female.      Pre-op Assessment    I have reviewed the Patient Summary Reports.     I have reviewed the Nursing Notes. I have reviewed the NPO Status.   I have reviewed the Medications.     Review of Systems  Anesthesia Hx:  No problems with previous Anesthesia  Family Hx of Anesthesia complications:  Personal Hx of Anesthesia complications   Social:  Non-Smoker    Hematology/Oncology:  Hematology Normal   Oncology Normal     EENT/Dental:EENT/Dental Normal   Cardiovascular:   Hypertension Past MI  CHF    Pulmonary:  Pulmonary Normal    Renal/:  Renal/ Normal     Hepatic/GI:   GERD    Musculoskeletal:   Arthritis     Neurological:   CVA Neuromuscular Disease,    Endocrine:   Hypothyroidism  Obesity / BMI > 30  Dermatological:  Skin Normal    Psych:   Psychiatric History depression          Physical Exam  General: Well nourished, Cooperative, Alert and Oriented    Airway:  Mallampati: II   Mouth Opening: Normal  TM Distance: Normal  Tongue: Normal  Neck ROM: Normal ROM        Anesthesia Plan  Type of Anesthesia, risks & benefits discussed:    Anesthesia Type: Gen Natural Airway  Intra-op Monitoring Plan: Standard ASA Monitors  Post Op Pain Control Plan: multimodal analgesia  Induction:  IV  Informed Consent: Informed consent signed with the Patient and all parties understand the risks and agree with anesthesia plan.  All questions answered. Patient consented to blood products? Yes  ASA Score: 3    Ready For Surgery From Anesthesia Perspective.     .

## 2022-12-07 NOTE — ANESTHESIA POSTPROCEDURE EVALUATION
Anesthesia Post Evaluation    Patient: Victoria Delarosa    Procedure(s) Performed: * No procedures listed *    Final Anesthesia Type: general      Patient location during evaluation: PACU  Patient participation: Yes- Able to Participate  Level of consciousness: awake and alert and oriented  Post-procedure vital signs: reviewed and stable  Pain management: adequate  Airway patency: patent    PONV status at discharge: No PONV  Anesthetic complications: no      Cardiovascular status: blood pressure returned to baseline and hemodynamically stable  Respiratory status: unassisted  Hydration status: euvolemic  Follow-up not needed.          Vitals Value Taken Time   /86 12/07/22 1432   Temp  12/07/22 1434   Pulse 79 12/07/22 1434   Resp 12 12/07/22 1434   SpO2 94 % 12/07/22 1434   Vitals shown include unvalidated device data.      No case tracking events are documented in the log.      Pain/Mira Score: Mira Score: 8 (12/7/2022  2:30 PM)

## 2022-12-07 NOTE — H&P
[facility]  Gastroenterology  H&P    Patient Name: Victoria Delarosa  MRN: [id number]  Admission Date: 12/7/2022  Code Status: Full Code    Attending Provider: JAMES Velasco   Primary Care Physician: Tim Burgos MD  Principal Problem:<principal problem not specified>    Subjective:     History of Present Illness: Pt c/o nausea and ruq pain; for egd.    Past Medical History:   Diagnosis Date    Anxiety state     BMI 32.0-32.9,adult     Bronchitis     CHF (congestive heart failure)     Cobalamin deficiency     Deep vein thrombosis     Depressive disorder     General anesthetics causing adverse effect in therapeutic use     GERD (gastroesophageal reflux disease)     Heart attack     20 yrs ago    Hypertension     Hypothyroidism     Lower extremity edema     Neuropathy     Osteoarthritis of both knees     Pain in left leg     Pain in right leg     Peripheral edema     Pulmonary embolism     Stroke     Varicose veins of bilateral lower extremities with pain     Vitamin D deficiency        Past Surgical History:   Procedure Laterality Date    BILATERAL TUBAL LIGATION      CARDIAC CATHETERIZATION      COLONOSCOPY      knee scope Left     THYROIDECTOMY, PARTIAL      TONSILLECTOMY, ADENOIDECTOMY      TOTAL KNEE ARTHROPLASTY Right     TOTAL KNEE ARTHROPLASTY Right     TUBAL LIGATION      VAGINAL DELIVERY      x 3       Review of patient's allergies indicates:   Allergen Reactions    Lisinopril      Cough      Statins-hmg-coa reductase inhibitors Hives and Itching     Family History       Problem Relation (Age of Onset)    Arthritis Sister, Brother    Heart disease Brother    Stroke Brother          Tobacco Use    Smoking status: Never    Smokeless tobacco: Never   Substance and Sexual Activity    Alcohol use: Never    Drug use: Never    Sexual activity: Yes     Partners: Male     Birth control/protection: Post-menopausal, Surgical     Comment: tubal     Review of Systems   Respiratory: Negative.      Cardiovascular: Negative.    Gastrointestinal:  Positive for abdominal pain and nausea.   Objective:     Vital Signs (Most Recent):  Pulse: 70 (12/07/22 1246)  Resp: 13 (12/07/22 1246)  BP: (!) 150/82 (12/07/22 1246)  SpO2: 96 % (12/07/22 1246)   Vital Signs (24h Range):  Pulse:  [70] 70  Resp:  [13] 13  SpO2:  [96 %] 96 %  BP: (150)/(82) 150/82     Weight: 88.9 kg (196 lb) (12/07/22 1246)  Body mass index is 30.7 kg/m².    No intake or output data in the 24 hours ending 12/07/22 1420    Lines/Drains/Airways       Peripheral Intravenous Line  Duration                  Peripheral IV - Single Lumen 12/07/22 1246 22 G Posterior;Right Hand <1 day                    Physical Exam  Vitals reviewed.   Constitutional:       General: She is not in acute distress.     Appearance: Normal appearance. She is well-developed. She is not ill-appearing.   HENT:      Head: Normocephalic and atraumatic.      Nose: Nose normal.   Eyes:      Pupils: Pupils are equal, round, and reactive to light.   Cardiovascular:      Rate and Rhythm: Normal rate and regular rhythm.   Pulmonary:      Effort: Pulmonary effort is normal.      Breath sounds: Normal breath sounds. No wheezing.   Abdominal:      General: Abdomen is flat. Bowel sounds are normal. There is no distension.      Palpations: Abdomen is soft.      Tenderness: There is no abdominal tenderness. There is no guarding.   Skin:     General: Skin is warm and dry.      Coloration: Skin is not jaundiced.   Neurological:      Mental Status: She is alert.   Psychiatric:         Attention and Perception: Attention normal.         Mood and Affect: Affect normal.         Speech: Speech normal.         Behavior: Behavior is cooperative.      Comments: Pt was calm while speaking.       Significant Labs:  CBC: No results for input(s): WBC, HGB, HCT, PLT in the last 48 hours.  CMP: No results for input(s): GLU, CALCIUM, ALBUMIN, PROT, NA, K, CO2, CL, BUN, CREATININE, ALKPHOS, ALT, AST, BILITOT in  the last 48 hours.    Significant Imaging:  Imaging results within the past 24 hours have been reviewed.    Assessment/Plan:     There are no hospital problems to display for this patient.        Imp: nausea, ruq pain  Plan: egd    Preet Rodriguez MD  Gastroenterology  [facility]

## 2022-12-07 NOTE — TRANSFER OF CARE
"Anesthesia Transfer of Care Note    Patient: Victoria Delarosa    Procedure(s) Performed: * No procedures listed *    Patient location: PACU    Anesthesia Type: general    Transport from OR: Transported from OR on room air with adequate spontaneous ventilation    Post pain: adequate analgesia    Post assessment: no apparent anesthetic complications    Post vital signs: stable    Level of consciousness: alert and responds to stimulation    Nausea/Vomiting: no nausea/vomiting    Complications: none    Transfer of care protocol was followed      Last vitals:   Visit Vitals  BP (!) 152/91   Pulse 70   Resp 15   Ht 5' 7" (1.702 m)   Wt 88.9 kg (196 lb)   SpO2 95%   Breastfeeding No   BMI 30.70 kg/m²     "

## 2022-12-09 PROBLEM — D3A.8: Status: ACTIVE | Noted: 2022-12-09

## 2022-12-09 LAB
DHEA SERPL-MCNC: NORMAL
ESTROGEN SERPL-MCNC: NORMAL PG/ML
INSULIN SERPL-ACNC: NORMAL U[IU]/ML
LAB AP GROSS DESCRIPTION: NORMAL
LAB AP LABORATORY NOTES: NORMAL
T3RU NFR SERPL: NORMAL %

## 2022-12-13 ENCOUNTER — TELEPHONE (OUTPATIENT)
Dept: GASTROENTEROLOGY | Facility: CLINIC | Age: 67
End: 2022-12-13
Payer: MEDICARE

## 2022-12-13 NOTE — TELEPHONE ENCOUNTER
Hello,    Your egd bx report shows reflux esophagitis, gastritis, which is inflammation of the stomach and esophagus ,and a neuroendocrine tumor of the stomach. Dr. Rodriguez wants you to repeat egd with polypectomy in 1/23. If you have any questions or concern please give us a call at our office.      ----- Message from Preet Rodriguez MD sent at 12/9/2022  5:56 PM CST -----  Schedule pt for repeat egd with polypectomy in 1/23. Biopsies show reflux esophagitis, gastritis and a neuroendocrine tumor of the stomach.

## 2022-12-14 ENCOUNTER — TELEPHONE (OUTPATIENT)
Dept: GASTROENTEROLOGY | Facility: CLINIC | Age: 67
End: 2022-12-14
Payer: MEDICARE

## 2022-12-14 DIAGNOSIS — D3A.8 BENIGN NEUROENDOCRINE TUMOR OF STOMACH: Primary | ICD-10-CM

## 2023-02-01 ENCOUNTER — HOSPITAL ENCOUNTER (OUTPATIENT)
Dept: GASTROENTEROLOGY | Facility: HOSPITAL | Age: 68
Discharge: HOME | End: 2023-02-01
Attending: INTERNAL MEDICINE
Payer: MEDICARE

## 2023-02-01 ENCOUNTER — ANESTHESIA EVENT (OUTPATIENT)
Dept: GASTROENTEROLOGY | Facility: HOSPITAL | Age: 68
End: 2023-02-01
Payer: MEDICARE

## 2023-02-01 ENCOUNTER — ANESTHESIA (OUTPATIENT)
Dept: GASTROENTEROLOGY | Facility: HOSPITAL | Age: 68
End: 2023-02-01
Payer: MEDICARE

## 2023-02-01 VITALS
HEART RATE: 62 BPM | SYSTOLIC BLOOD PRESSURE: 126 MMHG | DIASTOLIC BLOOD PRESSURE: 76 MMHG | TEMPERATURE: 97 F | OXYGEN SATURATION: 92 %

## 2023-02-01 VITALS
DIASTOLIC BLOOD PRESSURE: 65 MMHG | TEMPERATURE: 97 F | HEART RATE: 58 BPM | SYSTOLIC BLOOD PRESSURE: 132 MMHG | RESPIRATION RATE: 18 BRPM | OXYGEN SATURATION: 96 %

## 2023-02-01 DIAGNOSIS — R10.11 RUQ ABDOMINAL PAIN: ICD-10-CM

## 2023-02-01 DIAGNOSIS — K44.9 HH (HIATUS HERNIA): Primary | ICD-10-CM

## 2023-02-01 DIAGNOSIS — D3A.8 BENIGN NEUROENDOCRINE TUMOR OF STOMACH: ICD-10-CM

## 2023-02-01 PROCEDURE — 88305 TISSUE EXAM BY PATHOLOGIST: CPT | Mod: TC,SUR | Performed by: INTERNAL MEDICINE

## 2023-02-01 PROCEDURE — 25000003 PHARM REV CODE 250: Performed by: NURSE ANESTHETIST, CERTIFIED REGISTERED

## 2023-02-01 PROCEDURE — 88360 TUMOR IMMUNOHISTOCHEM/MANUAL: CPT | Mod: 26,XU,, | Performed by: PATHOLOGY

## 2023-02-01 PROCEDURE — D9220A PRA ANESTHESIA: ICD-10-PCS | Mod: ,,, | Performed by: NURSE ANESTHETIST, CERTIFIED REGISTERED

## 2023-02-01 PROCEDURE — 27000716 HC OXISENSOR PROBE, ANY SIZE: Performed by: NURSE ANESTHETIST, CERTIFIED REGISTERED

## 2023-02-01 PROCEDURE — 63600175 PHARM REV CODE 636 W HCPCS: Performed by: NURSE ANESTHETIST, CERTIFIED REGISTERED

## 2023-02-01 PROCEDURE — 37000008 HC ANESTHESIA 1ST 15 MINUTES

## 2023-02-01 PROCEDURE — 43270 EGD LESION ABLATION: CPT | Performed by: INTERNAL MEDICINE

## 2023-02-01 PROCEDURE — 88305 TISSUE EXAM BY PATHOLOGIST: CPT | Mod: 26,,, | Performed by: PATHOLOGY

## 2023-02-01 PROCEDURE — 43270 PR EGD, FLEX, W/ABLATION, TUMOR/POLYP/LESION(S), W/ DILATION: ICD-10-PCS | Mod: ,,, | Performed by: INTERNAL MEDICINE

## 2023-02-01 PROCEDURE — 43270 EGD LESION ABLATION: CPT | Mod: ,,, | Performed by: INTERNAL MEDICINE

## 2023-02-01 PROCEDURE — 43239 EGD BIOPSY SINGLE/MULTIPLE: CPT | Mod: 59,,, | Performed by: INTERNAL MEDICINE

## 2023-02-01 PROCEDURE — 88341 SURGICAL PATHOLOGY: ICD-10-PCS | Mod: 26,XU,, | Performed by: PATHOLOGY

## 2023-02-01 PROCEDURE — 88342 IMHCHEM/IMCYTCHM 1ST ANTB: CPT | Mod: 26,XU,, | Performed by: PATHOLOGY

## 2023-02-01 PROCEDURE — 88360 SURGICAL PATHOLOGY: ICD-10-PCS | Mod: 26,XU,, | Performed by: PATHOLOGY

## 2023-02-01 PROCEDURE — 37000009 HC ANESTHESIA EA ADD 15 MINS

## 2023-02-01 PROCEDURE — 88342 SURGICAL PATHOLOGY: ICD-10-PCS | Mod: 26,XU,, | Performed by: PATHOLOGY

## 2023-02-01 PROCEDURE — 45380 COLONOSCOPY AND BIOPSY: CPT | Mod: PT | Performed by: INTERNAL MEDICINE

## 2023-02-01 PROCEDURE — 43239 EGD BIOPSY SINGLE/MULTIPLE: CPT | Mod: 59 | Performed by: INTERNAL MEDICINE

## 2023-02-01 PROCEDURE — D9220A PRA ANESTHESIA: Mod: ,,, | Performed by: NURSE ANESTHETIST, CERTIFIED REGISTERED

## 2023-02-01 PROCEDURE — 27000284 HC CANNULA NASAL: Performed by: NURSE ANESTHETIST, CERTIFIED REGISTERED

## 2023-02-01 PROCEDURE — 27201423 OPTIME MED/SURG SUP & DEVICES STERILE SUPPLY

## 2023-02-01 PROCEDURE — 88341 IMHCHEM/IMCYTCHM EA ADD ANTB: CPT | Mod: 26,XU,, | Performed by: PATHOLOGY

## 2023-02-01 PROCEDURE — 43239 PR EGD, FLEX, W/BIOPSY, SGL/MULTI: ICD-10-PCS | Mod: 59,,, | Performed by: INTERNAL MEDICINE

## 2023-02-01 PROCEDURE — 88305 SURGICAL PATHOLOGY: ICD-10-PCS | Mod: 26,,, | Performed by: PATHOLOGY

## 2023-02-01 RX ORDER — LIDOCAINE HYDROCHLORIDE 20 MG/ML
INJECTION, SOLUTION EPIDURAL; INFILTRATION; INTRACAUDAL; PERINEURAL
Status: DISCONTINUED | OUTPATIENT
Start: 2023-02-01 | End: 2023-02-01

## 2023-02-01 RX ORDER — SODIUM CHLORIDE 0.9 % (FLUSH) 0.9 %
10 SYRINGE (ML) INJECTION
Status: CANCELLED | OUTPATIENT
Start: 2023-02-01

## 2023-02-01 RX ORDER — PROPOFOL 10 MG/ML
VIAL (ML) INTRAVENOUS CONTINUOUS PRN
Status: DISCONTINUED | OUTPATIENT
Start: 2023-02-01 | End: 2023-02-01

## 2023-02-01 RX ADMIN — SODIUM CHLORIDE: 9 INJECTION, SOLUTION INTRAVENOUS at 07:02

## 2023-02-01 RX ADMIN — PROPOFOL 175 MCG/KG/MIN: 10 INJECTION, EMULSION INTRAVENOUS at 07:02

## 2023-02-01 RX ADMIN — LIDOCAINE HYDROCHLORIDE 60 MG: 20 INJECTION, SOLUTION INTRAVENOUS at 07:02

## 2023-02-01 NOTE — TRANSFER OF CARE
Anesthesia Transfer of Care Note    Patient: Victoria Delarosa    Procedure(s) Performed: * No procedures listed *    Patient location: GI    Anesthesia Type: general    Transport from OR: Transported from OR on room air with adequate spontaneous ventilation. Continuous ECG monitoring in transport. Continuous SpO2 monitoring in transport    Post pain: adequate analgesia    Post assessment: no apparent anesthetic complications    Post vital signs: stable    Level of consciousness: sedated and responds to stimulation    Nausea/Vomiting: no nausea/vomiting    Complications: none    Transfer of care protocol was followedComments: Good SV continue, NAD, VSS, RTRN      Last vitals:   Visit Vitals  /76 (BP Location: Left arm, Patient Position: Lying)   Pulse (!) 56   Temp 36.1 °C (97 °F) (Skin)   Resp 18   SpO2 95%   Breastfeeding No

## 2023-02-01 NOTE — ANESTHESIA PREPROCEDURE EVALUATION
02/01/2023  Victoria Delarosa is a 67 y.o., female.    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    BMI 32.0-32.9,adult     Bronchitis     CHF (congestive heart failure)     Cobalamin deficiency     Deep vein thrombosis     Depressive disorder     General anesthetics causing adverse effect in therapeutic use     GERD (gastroesophageal reflux disease)     Heart attack     20 yrs ago    Hypertension     Hypothyroidism     Lower extremity edema     Neuropathy     Osteoarthritis of both knees     Pain in left leg     Pain in right leg     Peripheral edema     Pulmonary embolism     Stroke     Varicose veins of bilateral lower extremities with pain     Vitamin D deficiency        Past Surgical History:   Procedure Laterality Date    BILATERAL TUBAL LIGATION      CARDIAC CATHETERIZATION      COLONOSCOPY      knee scope Left     THYROIDECTOMY, PARTIAL      TONSILLECTOMY, ADENOIDECTOMY      TOTAL KNEE ARTHROPLASTY Right     TOTAL KNEE ARTHROPLASTY Right     TUBAL LIGATION      VAGINAL DELIVERY      x 3       Family History   Problem Relation Age of Onset    Arthritis Sister     Arthritis Brother     Heart disease Brother     Stroke Brother        Social History     Socioeconomic History    Marital status:     Number of children: 3   Occupational History    Occupation: retired   Tobacco Use    Smoking status: Never    Smokeless tobacco: Never   Substance and Sexual Activity    Alcohol use: Never    Drug use: Never    Sexual activity: Yes     Partners: Male     Birth control/protection: Post-menopausal, Surgical     Comment: tubal       Current Outpatient Medications   Medication Sig Dispense Refill    azithromycin (Z-FABRICIO) 250 MG tablet Take 2 tablets by mouth on day 1; Take 1 tablet by mouth on days 2-5  6 tablet 0    cyanocobalamin 1,000 mcg/mL injection INJECT 1 ML EVERY MONTH 1 mL 11    ezetimibe (ZETIA) 10 mg tablet Take 10 mg by mouth once daily.      furosemide (LASIX) 20 MG tablet Take 1 tablet (20 mg total) by mouth once daily. 90 tablet 1    guaiFENesin-codeine 100-10 mg/5 ml (TUSSI-ORGANIDIN NR)  mg/5 mL syrup Take 5 mLs by mouth every 4 (four) hours as needed for Cough. 120 mL 0    levothyroxine (SYNTHROID) 25 MCG tablet Take 1 tablet (25 mcg total) by mouth once daily. 90 tablet 1    losartan (COZAAR) 50 MG tablet Take 1 tablet (50 mg total) by mouth once daily. 90 tablet 1    magnesium oxide (MAG-OX) 400 mg (241.3 mg magnesium) tablet Take 1 tablet by mouth once daily.      oxybutynin (DITROPAN) 5 MG Tab Take 1 tablet by mouth 2 (two) times daily.      oxybutynin (DITROPAN) 5 MG Tab Take 1 tablet (5 mg total) by mouth 2 (two) times daily. 180 tablet 2    pantoprazole (PROTONIX) 40 MG tablet Take 1 tablet (40 mg total) by mouth once daily. 30 tablet 11    potassium chloride (KLOR-CON) 10 MEQ TbSR Take 1 tablet (10 mEq total) by mouth once daily. 90 tablet 1    potassium chloride SA (K-DUR,KLOR-CON M) 10 MEQ tablet Take 1 tablet (10 mEq total) by mouth once daily. (Patient not taking: Reported on 11/23/2022) 90 tablet 1    prednisoLONE acetate (PRED FORTE) 1 % DrpS Place 1 drop into the left eye once daily.      ranolazine (RANEXA) 500 MG Tb12 Take 500 mg by mouth 2 (two) times daily.      sertraline (ZOLOFT) 100 MG tablet Take 1 tablet (100 mg total) by mouth once daily. 90 tablet 1    spironolactone (ALDACTONE) 25 MG tablet Take 1 tablet (25 mg total) by mouth once daily. 90 tablet 1    sulfamethoxazole-trimethoprim 800-160mg (BACTRIM DS) 800-160 mg Tab Take 1 tablet by mouth 2 (two) times daily. 14 tablet 0    warfarin (COUMADIN) 5 MG tablet Take 5 mg by mouth Daily.       No current facility-administered medications for this encounter.       Review of patient's allergies  indicates:   Allergen Reactions    Lisinopril      Cough      Statins-hmg-coa reductase inhibitors Hives and Itching       Pre-op Assessment    I have reviewed the Patient Summary Reports.     I have reviewed the Nursing Notes. I have reviewed the NPO Status.   I have reviewed the Medications.     Review of Systems  Anesthesia Hx:  No problems with previous Anesthesia  Denies Family Hx of Anesthesia complications.   Denies Personal Hx of Anesthesia complications.   Hematology/Oncology:     Oncology Normal     EENT/Dental:EENT/Dental Normal   Cardiovascular:   Hypertension Past MI  CHF hyperlipidemia    Renal/:  Renal/ Normal     Hepatic/GI:   GERD    Musculoskeletal:   Arthritis     Neurological:   CVA Neuromuscular Disease,    Endocrine:   Hypothyroidism  Obesity / BMI > 30  Dermatological:  Skin Normal    Psych:   Psychiatric History          Physical Exam  General: Well nourished, Alert, Oriented and Cooperative    Airway:  Mouth Opening: Normal  Neck ROM: Normal ROM    Chest/Lungs:  Normal Respiratory Rate    Heart:  Rate: Normal        Anesthesia Plan  Type of Anesthesia, risks & benefits discussed:    Anesthesia Type: Gen Natural Airway, MAC  Intra-op Monitoring Plan: Standard ASA Monitors  Post Op Pain Control Plan: multimodal analgesia and IV/PO Opioids PRN  Induction:  IV  Informed Consent: Informed consent signed with the Patient and all parties understand the risks and agree with anesthesia plan.  All questions answered. Patient consented to blood products? Yes  ASA Score: 3  Day of Surgery Review of History & Physical: I have interviewed and examined the patient. I have reviewed the patient's H&P dated: There are no significant changes.     Ready For Surgery From Anesthesia Perspective.     .

## 2023-02-01 NOTE — H&P
[facility]  Gastroenterology  H&P    Patient Name: Victoria Delarosa  MRN: [id number]  Admission Date: 2/1/2023  Code Status: Prior    Attending Provider: Preet Rodriguez MD   Primary Care Physician: Tim Burgos MD  Principal Problem:<principal problem not specified>    Subjective:     History of Present Illness: Pt has neuroendocrine tumor of stomach and ruq pain; for egd.    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    BMI 32.0-32.9,adult     Bronchitis     CHF (congestive heart failure)     Cobalamin deficiency     Deep vein thrombosis     Depressive disorder     General anesthetics causing adverse effect in therapeutic use     GERD (gastroesophageal reflux disease)     Heart attack     20 yrs ago    Hypertension     Hypothyroidism     Lower extremity edema     Neuropathy     Osteoarthritis of both knees     Pain in left leg     Pain in right leg     Peripheral edema     Pulmonary embolism     Stroke     Varicose veins of bilateral lower extremities with pain     Vitamin D deficiency        Past Surgical History:   Procedure Laterality Date    BILATERAL TUBAL LIGATION      CARDIAC CATHETERIZATION      COLONOSCOPY      knee scope Left     THYROIDECTOMY, PARTIAL      TONSILLECTOMY, ADENOIDECTOMY      TOTAL KNEE ARTHROPLASTY Right     TOTAL KNEE ARTHROPLASTY Right     TUBAL LIGATION      VAGINAL DELIVERY      x 3       Review of patient's allergies indicates:   Allergen Reactions    Lisinopril      Cough      Statins-hmg-coa reductase inhibitors Hives and Itching     Family History       Problem Relation (Age of Onset)    Arthritis Sister, Brother    Heart disease Brother    Stroke Brother          Tobacco Use    Smoking status: Never    Smokeless tobacco: Never   Substance and Sexual Activity    Alcohol use: Never    Drug use: Never    Sexual activity: Yes     Partners: Male     Birth control/protection: Post-menopausal, Surgical      Comment: tubal     Review of Systems   Respiratory: Negative.     Cardiovascular: Negative.    Gastrointestinal:  Positive for abdominal pain.   Objective:     Vital Signs (Most Recent):    Vital Signs (24h Range):           There is no height or weight on file to calculate BMI.    No intake or output data in the 24 hours ending 02/01/23 0744    Lines/Drains/Airways       Peripheral Intravenous Line  Duration                  Peripheral IV - Single Lumen 02/01/23 0722 22 G Right Antecubital <1 day                    Physical Exam  Vitals reviewed.   Constitutional:       General: She is not in acute distress.     Appearance: Normal appearance. She is well-developed. She is not ill-appearing.   HENT:      Head: Normocephalic and atraumatic.      Nose: Nose normal.   Eyes:      Pupils: Pupils are equal, round, and reactive to light.   Cardiovascular:      Rate and Rhythm: Normal rate and regular rhythm.   Pulmonary:      Effort: Pulmonary effort is normal.      Breath sounds: Normal breath sounds. No wheezing.   Abdominal:      General: Abdomen is flat. Bowel sounds are normal. There is no distension.      Palpations: Abdomen is soft.      Tenderness: There is no abdominal tenderness. There is no guarding.   Skin:     General: Skin is warm and dry.      Coloration: Skin is not jaundiced.   Neurological:      Mental Status: She is alert.   Psychiatric:         Attention and Perception: Attention normal.         Mood and Affect: Affect normal.         Speech: Speech normal.         Behavior: Behavior is cooperative.      Comments: Pt was calm while speaking.       Significant Labs:  CBC: No results for input(s): WBC, HGB, HCT, PLT in the last 48 hours.  CMP: No results for input(s): GLU, CALCIUM, ALBUMIN, PROT, NA, K, CO2, CL, BUN, CREATININE, ALKPHOS, ALT, AST, BILITOT in the last 48 hours.    Significant Imaging:  Imaging results within the past 24 hours have been reviewed.    Assessment/Plan:     There are no  hospital problems to display for this patient.        Imp: gastric tumor, ruq pain  Plan: egd    Preet Rodriguez MD  Gastroenterology  [facility]

## 2023-02-01 NOTE — ANESTHESIA POSTPROCEDURE EVALUATION
Anesthesia Post Evaluation    Patient: Victoria Delarosa    Procedure(s) Performed: * No procedures listed *    Final Anesthesia Type: general      Patient location during evaluation: GI PACU  Patient participation: Yes- Able to Participate  Level of consciousness: awake and alert  Post-procedure vital signs: reviewed and stable  Pain management: adequate  Airway patency: patent    PONV status at discharge: No PONV  Anesthetic complications: no      Cardiovascular status: blood pressure returned to baseline and hemodynamically stable  Respiratory status: spontaneous ventilation  Hydration status: euvolemic  Follow-up not needed.  Comments: Pt voices appreciation for care          Vitals Value Taken Time   /65 02/01/23 0844   Temp 97 F 02/01/23 1124   Pulse 57 02/01/23 0848   Resp 20 02/01/23 0848   SpO2 98 % 02/01/23 0848   Vitals shown include unvalidated device data.      Event Time   Out of Recovery 08:59:08         Pain/Mira Score: Mira Score: 9 (2/1/2023  7:56 AM)

## 2023-02-01 NOTE — DISCHARGE INSTRUCTIONS
2/1/23     Impression  Overall Impression: Mucosa of the esophagus is normal, overlying a 3cm hiatus hernia.  Two diminutive polypoid lesions were noted in the fundus and both were biopsied. The larger was ablated with APC and treated with a hemostasis clip.  Mucosa of the duodenum was normal.     Recommendation    Await pathology results      Schedule CT abdomen for ruq pain  - [facility]    and return to GI clinic with ENID Calvert; - March 23, 20223 @ 8:30 am     schedule repeat EGD in 6 months. - August 1, 2023 @ 7:30 am    Disp: DC to home in stable condition. Resume diet and meds. No driving x 24 hours. F/U as above.   Dx; hiatus hernia, ruq pain, gastric neuroendocrine tumor.    No driving today, no operating heavy machinery, no signing any legal documents until tomorrow.    Drink lots of fluids, resume regular diet.  Take your normal medications.

## 2023-02-07 ENCOUNTER — TELEPHONE (OUTPATIENT)
Dept: GASTROENTEROLOGY | Facility: CLINIC | Age: 68
End: 2023-02-07
Payer: MEDICARE

## 2023-02-07 NOTE — TELEPHONE ENCOUNTER
Called patient to discuss results and verbalized understanding. Pt will call back to schedule for repeat of egd in 6mo.        ----- Message from Preet Rodriguez MD sent at 2/2/2023  5:42 PM CST -----  Schedule repeat EGD in 6 months. Pt still has a neuroendocrine tumor on biopsy.

## 2023-02-10 ENCOUNTER — HOSPITAL ENCOUNTER (OUTPATIENT)
Dept: RADIOLOGY | Facility: HOSPITAL | Age: 68
Discharge: HOME | End: 2023-02-10
Attending: INTERNAL MEDICINE
Payer: MEDICARE

## 2023-02-10 DIAGNOSIS — R10.11 RUQ ABDOMINAL PAIN: ICD-10-CM

## 2023-02-10 LAB — CREAT SERPL-MCNC: 1.2 MG/DL (ref 0.6–1.3)

## 2023-02-10 PROCEDURE — 74177 CT ABD & PELVIS W/CONTRAST: CPT | Mod: 26,,, | Performed by: RADIOLOGY

## 2023-02-10 PROCEDURE — 74177 CT ABD & PELVIS W/CONTRAST: CPT | Mod: TC

## 2023-02-10 PROCEDURE — 82565 ASSAY OF CREATININE: CPT

## 2023-02-10 PROCEDURE — 74177 CT ABDOMEN PELVIS WITH CONTRAST: ICD-10-PCS | Mod: 26,,, | Performed by: RADIOLOGY

## 2023-02-10 PROCEDURE — 25500020 PHARM REV CODE 255: Performed by: INTERNAL MEDICINE

## 2023-02-10 RX ADMIN — IOPAMIDOL 100 ML: 755 INJECTION, SOLUTION INTRAVENOUS at 09:02

## 2023-02-14 ENCOUNTER — TELEPHONE (OUTPATIENT)
Dept: GASTROENTEROLOGY | Facility: CLINIC | Age: 68
End: 2023-02-14
Payer: MEDICARE

## 2023-02-14 NOTE — TELEPHONE ENCOUNTER
Attempted to call patient about results and was not able leave vm.    ----- Message from Preet Rodriguez MD sent at 2/13/2023  9:38 AM CST -----  Spine DJD is noted on CT. It was otherwise read as being normal.

## 2023-02-20 ENCOUNTER — OFFICE VISIT (OUTPATIENT)
Dept: FAMILY MEDICINE | Facility: CLINIC | Age: 68
End: 2023-02-20
Payer: MEDICARE

## 2023-02-20 VITALS
BODY MASS INDEX: 32.12 KG/M2 | WEIGHT: 204.63 LBS | DIASTOLIC BLOOD PRESSURE: 86 MMHG | SYSTOLIC BLOOD PRESSURE: 120 MMHG | OXYGEN SATURATION: 97 % | HEIGHT: 67 IN | TEMPERATURE: 97 F | HEART RATE: 64 BPM

## 2023-02-20 DIAGNOSIS — M62.830 LUMBAR PARASPINAL MUSCLE SPASM: ICD-10-CM

## 2023-02-20 DIAGNOSIS — B35.1 ONYCHOMYCOSIS: Primary | ICD-10-CM

## 2023-02-20 PROCEDURE — 99213 OFFICE O/P EST LOW 20 MIN: CPT | Mod: ,,, | Performed by: FAMILY MEDICINE

## 2023-02-20 PROCEDURE — 99213 PR OFFICE/OUTPT VISIT, EST, LEVL III, 20-29 MIN: ICD-10-PCS | Mod: ,,, | Performed by: FAMILY MEDICINE

## 2023-02-20 RX ORDER — TERBINAFINE HYDROCHLORIDE 250 MG/1
250 TABLET ORAL DAILY
Qty: 30 TABLET | Refills: 1 | Status: SHIPPED | OUTPATIENT
Start: 2023-02-20 | End: 2023-03-22

## 2023-02-20 RX ORDER — CYCLOBENZAPRINE HCL 10 MG
10 TABLET ORAL 3 TIMES DAILY PRN
Qty: 30 TABLET | Refills: 0 | Status: SHIPPED | OUTPATIENT
Start: 2023-02-20 | End: 2023-03-02

## 2023-02-20 NOTE — PROGRESS NOTES
Tim Burgos MD   [facility]  [address] [place] [address]     PATIENT NAME: Victoria Delarosa  : 1955  DATE: 23  MRN: [id number]      Billing Provider: Tim Burgos MD  Level of Service:   Patient PCP Information       Provider PCP Type    Tim Burgos MD General            Reason for Visit / Chief Complaint: Low-back Pain ( Reports lower back pain that started Friday.  reports a bruise to right hip but patient states it hurts all the way across. Patient reports pain is affecting her walking. ) and Nail Problem (Patient reports painful thickening yellow toenail to right great toe.  )         History of Present Illness / Problem Focused Workflow     Victoria Delarosa presents to the clinic with Low-back Pain ( Reports lower back pain that started Friday.  reports a bruise to right hip but patient states it hurts all the way across. Patient reports pain is affecting her walking. ) and Nail Problem (Patient reports painful thickening yellow toenail to right great toe.  )     HPI    Review of Systems     Review of Systems   Constitutional:  Negative for activity change, appetite change, fatigue and fever.   HENT:  Negative for nasal congestion, ear pain, hearing loss, sinus pressure/congestion and sore throat.    Respiratory:  Negative for cough, chest tightness and shortness of breath.    Cardiovascular:  Negative for chest pain and palpitations.   Gastrointestinal:  Negative for abdominal pain and fecal incontinence.   Genitourinary:  Negative for bladder incontinence and difficulty urinating.   Musculoskeletal:  Negative for arthralgias.   Integumentary:  Negative for rash.   Neurological:  Negative for dizziness and headaches.      Medical / Social / Family History     Past Medical History:   Diagnosis Date    Anxiety state     Benign neuroendocrine tumor of stomach 2022    BMI 32.0-32.9,adult     Bronchitis     CHF (congestive heart  failure)     Cobalamin deficiency     Deep vein thrombosis     Depressive disorder     General anesthetics causing adverse effect in therapeutic use     GERD (gastroesophageal reflux disease)     Heart attack     20 yrs ago    Hypertension     Hypothyroidism     Lower extremity edema     Neuropathy     Osteoarthritis of both knees     Pain in left leg     Pain in right leg     Peripheral edema     Pulmonary embolism     Stroke     Varicose veins of bilateral lower extremities with pain     Vitamin D deficiency        Past Surgical History:   Procedure Laterality Date    BILATERAL TUBAL LIGATION      CARDIAC CATHETERIZATION      COLONOSCOPY      knee scope Left     THYROIDECTOMY, PARTIAL      TONSILLECTOMY, ADENOIDECTOMY      TOTAL KNEE ARTHROPLASTY Right     TOTAL KNEE ARTHROPLASTY Right     TUBAL LIGATION      VAGINAL DELIVERY      x 3       Social History  Victoria Delarosa  reports that she has never smoked. She has never used smokeless tobacco. She reports that she does not drink alcohol and does not use drugs.    Family History  Victoria Delarosa  family history includes Arthritis in her brother and sister; Heart disease in her brother; Stroke in her brother.    Medications and Allergies     Medications  Outpatient Medications Marked as Taking for the 2/20/23 encounter (Office Visit) with Tim Burgos MD   Medication Sig Dispense Refill    cyanocobalamin 1,000 mcg/mL injection INJECT 1 ML EVERY MONTH 1 mL 11    ezetimibe (ZETIA) 10 mg tablet Take 10 mg by mouth once daily.      furosemide (LASIX) 20 MG tablet Take 1 tablet (20 mg total) by mouth once daily. 90 tablet 1    levothyroxine (SYNTHROID) 25 MCG tablet Take 1 tablet (25 mcg total) by mouth once daily. 90 tablet 1    losartan (COZAAR) 50 MG tablet Take 1 tablet (50 mg total) by mouth once daily. 90 tablet 1    magnesium oxide (MAG-OX) 400 mg (241.3 mg magnesium) tablet Take 1 tablet by mouth once daily.      oxybutynin (DITROPAN) 5 MG Tab Take 1 tablet  by mouth 2 (two) times daily.      oxybutynin (DITROPAN) 5 MG Tab Take 1 tablet (5 mg total) by mouth 2 (two) times daily. 180 tablet 2    potassium chloride (KLOR-CON) 10 MEQ TbSR Take 1 tablet (10 mEq total) by mouth once daily. 90 tablet 1    prednisoLONE acetate (PRED FORTE) 1 % DrpS Place 1 drop into the left eye once daily.      ranolazine (RANEXA) 500 MG Tb12 Take 500 mg by mouth 2 (two) times daily.      sertraline (ZOLOFT) 100 MG tablet Take 1 tablet (100 mg total) by mouth once daily. 90 tablet 1    spironolactone (ALDACTONE) 25 MG tablet Take 1 tablet (25 mg total) by mouth once daily. 90 tablet 1    warfarin (COUMADIN) 5 MG tablet Take 5 mg by mouth Daily.         Allergies  Review of patient's allergies indicates:   Allergen Reactions    Lisinopril      Cough      Statins-hmg-coa reductase inhibitors Hives and Itching       Physical Examination     Vitals:    02/20/23 1339   BP: 120/86   Pulse:    Temp:      Physical Exam  Constitutional:       General: She is not in acute distress.     Appearance: She is not ill-appearing.   HENT:      Head: Normocephalic and atraumatic.      Right Ear: Tympanic membrane and ear canal normal.      Left Ear: Tympanic membrane and ear canal normal.      Nose: Nose normal. No congestion or rhinorrhea.   Eyes:      Pupils: Pupils are equal, round, and reactive to light.   Cardiovascular:      Rate and Rhythm: Normal rate and regular rhythm.      Pulses: Normal pulses.      Heart sounds: No murmur heard.  Pulmonary:      Effort: No respiratory distress.      Breath sounds: No wheezing, rhonchi or rales.   Abdominal:      General: Bowel sounds are normal.      Palpations: Abdomen is soft.      Tenderness: There is no abdominal tenderness.      Hernia: No hernia is present.   Musculoskeletal:      Cervical back: Normal range of motion and neck supple.      Lumbar back: Spasms and tenderness present. Decreased range of motion.   Lymphadenopathy:      Cervical: No cervical  adenopathy.   Skin:     General: Skin is warm and dry.      Findings: Lesion (right great toenail hick ingrown) present.   Neurological:      Mental Status: She is alert.   Psychiatric:         Behavior: Behavior normal.         Thought Content: Thought content normal.        Assessment and Plan (including Health Maintenance)   :    Plan:         Health Maintenance Due   Topic Date Due    Hepatitis C Screening  Never done    Hemoglobin A1c (Diabetic Prevention Screening)  Never done    DEXA Scan  06/14/2021    COVID-19 Vaccine (4 - Booster for Moderna series) 02/25/2022    Influenza Vaccine (1) 09/01/2022    Mammogram  02/23/2023       Problem List Items Addressed This Visit    None    There are no diagnoses linked to this encounter.   Health Maintenance Topics with due status: Not Due       Topic Last Completion Date    Lipid Panel 09/08/2022    Colorectal Cancer Screening 11/28/2022       Procedures     Future Appointments   Date Time Provider Department Center   3/23/2023  8:30 AM JAMES Randle [facility]   3/29/2023 11:00 AM [facility] MAMMO1 [facility]   6/13/2023  1:00 PM [facility] [place]   8/1/2023  7:30 AM [facility] GI ROOM 02 [facility]        No follow-ups on file.       Signature:  Tim Burgos MD  [facility]  [address] [facility] [place] [address]  [phone] Phone  [phone] Fax    Date of encounter: 2/20/23

## 2023-03-06 ENCOUNTER — TELEPHONE (OUTPATIENT)
Dept: GASTROENTEROLOGY | Facility: CLINIC | Age: 68
End: 2023-03-06
Payer: MEDICARE

## 2023-03-06 NOTE — TELEPHONE ENCOUNTER
Your ct of abdomen was read normal and if you have any questions or concerns please give us a call at our office.       ----- Message from Preet Rodriguez MD sent at 2/13/2023  9:38 AM CST -----  Spine DJD is noted on CT. It was otherwise read as being normal.

## 2023-03-29 ENCOUNTER — HOSPITAL ENCOUNTER (OUTPATIENT)
Dept: RADIOLOGY | Facility: HOSPITAL | Age: 68
Discharge: HOME | End: 2023-03-29
Payer: MEDICARE

## 2023-03-29 DIAGNOSIS — Z12.31 VISIT FOR SCREENING MAMMOGRAM: ICD-10-CM

## 2023-03-29 PROCEDURE — 77067 SCR MAMMO BI INCL CAD: CPT | Mod: 26,,, | Performed by: RADIOLOGY

## 2023-03-29 PROCEDURE — 77067 SCR MAMMO BI INCL CAD: CPT | Mod: TC

## 2023-03-29 PROCEDURE — 77067 MAMMO DIGITAL SCREENING BILAT: ICD-10-PCS | Mod: 26,,, | Performed by: RADIOLOGY

## 2023-05-09 DIAGNOSIS — Z71.89 COMPLEX CARE COORDINATION: ICD-10-CM

## 2023-05-22 ENCOUNTER — OFFICE VISIT (OUTPATIENT)
Dept: GASTROENTEROLOGY | Facility: CLINIC | Age: 68
End: 2023-05-22
Payer: MEDICARE

## 2023-05-22 VITALS
DIASTOLIC BLOOD PRESSURE: 84 MMHG | WEIGHT: 201.38 LBS | SYSTOLIC BLOOD PRESSURE: 128 MMHG | BODY MASS INDEX: 31.61 KG/M2 | HEIGHT: 67 IN

## 2023-05-22 DIAGNOSIS — K44.9 HH (HIATUS HERNIA): ICD-10-CM

## 2023-05-22 DIAGNOSIS — R11.0 NAUSEA: ICD-10-CM

## 2023-05-22 DIAGNOSIS — K59.00 CONSTIPATION, UNSPECIFIED CONSTIPATION TYPE: ICD-10-CM

## 2023-05-22 DIAGNOSIS — R10.11 RIGHT UPPER QUADRANT ABDOMINAL PAIN: ICD-10-CM

## 2023-05-22 DIAGNOSIS — D3A.8 BENIGN NEUROENDOCRINE TUMOR OF STOMACH: Primary | ICD-10-CM

## 2023-05-22 PROCEDURE — 99214 OFFICE O/P EST MOD 30 MIN: CPT | Mod: S$PBB,,, | Performed by: NURSE PRACTITIONER

## 2023-05-22 PROCEDURE — 99214 OFFICE O/P EST MOD 30 MIN: CPT | Mod: PBBFAC | Performed by: NURSE PRACTITIONER

## 2023-05-22 PROCEDURE — 99214 PR OFFICE/OUTPT VISIT, EST, LEVL IV, 30-39 MIN: ICD-10-PCS | Mod: S$PBB,,, | Performed by: NURSE PRACTITIONER

## 2023-05-22 RX ORDER — KETOROLAC TROMETHAMINE 5 MG/ML
SOLUTION OPHTHALMIC
COMMUNITY
Start: 2023-05-11

## 2023-05-22 RX ORDER — MOXIFLOXACIN 5 MG/ML
SOLUTION/ DROPS OPHTHALMIC
COMMUNITY
Start: 2023-05-11

## 2023-05-22 NOTE — PROGRESS NOTES
Victoria Delarosa is a 67 y.o. female here for No chief complaint on file.        PCP: Tim Burgos  Referring Provider: No referring provider defined for this encounter.     HPI:  Presents for follow-up after EGD.  EGD on 02/01/2023, well-differentiated neuroendocrine tumor grade 2, moderate gastritis with intestinal metaplasia.  The polypoid lesions in the fundus were ablated and also clipped.  She is scheduled for repeat EGD on August 1st.  She also had a CT abdomen and pelvis, report reviewed, DJD in the spine noted.  The gallbladder appears normal.  Liver appears normal.  Patient does report intermittent right upper quadrant pain that has been present for at least 2-3 years.  She did have a HIDA scan in November of 2022 that was read as normal. Endorses early satiety and also to frequent nausea.  States that she does have intermittent vomiting if she eats a heavy meal.  We did discuss gastric emptying study.  Patient declines to schedule at this time prefers to wait until after the repeat EGD if needed.  We did discuss low residue and small frequent meal diet to reduce upper GI symptoms. Colonoscopy 11/28/2022, report reviewed, hyperplastic polyp in the rectum.        ROS:  Review of Systems   Constitutional:  Positive for appetite change (early satiety). Negative for fatigue, fever and unexpected weight change.   HENT:  Negative for trouble swallowing.    Respiratory:  Negative for shortness of breath.    Cardiovascular:  Negative for chest pain.   Gastrointestinal:  Positive for abdominal pain, constipation, nausea and vomiting. Negative for blood in stool, change in bowel habit, diarrhea, reflux and change in bowel habit.   Musculoskeletal:  Negative for gait problem.   Integumentary:  Negative for pallor.   Psychiatric/Behavioral:  The patient is not nervous/anxious.         PMHX:  has a past medical history of Anxiety state, Benign neuroendocrine tumor of stomach (12/9/2022), BMI 32.0-32.9,adult,  Bronchitis, CHF (congestive heart failure), Cobalamin deficiency, Deep vein thrombosis, Depressive disorder, General anesthetics causing adverse effect in therapeutic use, GERD (gastroesophageal reflux disease), Heart attack, Hypertension, Hypothyroidism, Lower extremity edema, Neuropathy, Osteoarthritis of both knees, Pain in left leg, Pain in right leg, Peripheral edema, Pulmonary embolism, Stroke, Varicose veins of bilateral lower extremities with pain, and Vitamin D deficiency.    PSHX:  has a past surgical history that includes Cardiac catheterization; Colonoscopy; knee scope (Left); Total knee arthroplasty (Right); TONSILLECTOMY, ADENOIDECTOMY; Thyroidectomy, partial; Total knee arthroplasty (Right); Bilateral tubal ligation; Vaginal delivery; and Tubal ligation.    PFHX: family history includes Arthritis in her brother and sister; Heart disease in her brother; Stroke in her brother.    PSlHX:  reports that she has never smoked. She has never used smokeless tobacco. She reports that she does not drink alcohol and does not use drugs.        Review of patient's allergies indicates:   Allergen Reactions    Lisinopril      Cough      Statins-hmg-coa reductase inhibitors Hives and Itching       Medication List with Changes/Refills   Current Medications    AZITHROMYCIN (Z-FABRICIO) 250 MG TABLET    Take 2 tablets by mouth on day 1; Take 1 tablet by mouth on days 2-5    CYANOCOBALAMIN 1,000 MCG/ML INJECTION    INJECT 1 ML EVERY MONTH    EZETIMIBE (ZETIA) 10 MG TABLET    Take 10 mg by mouth once daily.    FUROSEMIDE (LASIX) 20 MG TABLET    Take 1 tablet (20 mg total) by mouth once daily.    GUAIFENESIN-CODEINE 100-10 MG/5 ML (TUSSI-ORGANIDIN NR)  MG/5 ML SYRUP    Take 5 mLs by mouth every 4 (four) hours as needed for Cough.    KETOROLAC 0.5% (ACULAR) 0.5 % DROP        LEVOTHYROXINE (SYNTHROID) 25 MCG TABLET    Take 1 tablet (25 mcg total) by mouth once daily.    LOSARTAN (COZAAR) 50 MG TABLET    Take 1 tablet (50 mg  "total) by mouth once daily.    MAGNESIUM OXIDE (MAG-OX) 400 MG (241.3 MG MAGNESIUM) TABLET    Take 1 tablet by mouth once daily.    MOXIFLOXACIN (VIGAMOX) 0.5 % OPHTHALMIC SOLUTION        OXYBUTYNIN (DITROPAN) 5 MG TAB    Take 1 tablet by mouth 2 (two) times daily.    OXYBUTYNIN (DITROPAN) 5 MG TAB    Take 1 tablet (5 mg total) by mouth 2 (two) times daily.    PANTOPRAZOLE (PROTONIX) 40 MG TABLET    Take 1 tablet (40 mg total) by mouth once daily.    POTASSIUM CHLORIDE (KLOR-CON) 10 MEQ TBSR    Take 1 tablet (10 mEq total) by mouth once daily.    POTASSIUM CHLORIDE SA (K-DUR,KLOR-CON M) 10 MEQ TABLET    Take 1 tablet (10 mEq total) by mouth once daily.    PREDNISOLONE ACETATE (PRED FORTE) 1 % DRPS    Place 1 drop into the left eye once daily.    RANOLAZINE (RANEXA) 500 MG TB12    Take 500 mg by mouth 2 (two) times daily.    SERTRALINE (ZOLOFT) 100 MG TABLET    Take 1 tablet (100 mg total) by mouth once daily.    SPIRONOLACTONE (ALDACTONE) 25 MG TABLET    Take 1 tablet (25 mg total) by mouth once daily.    SULFAMETHOXAZOLE-TRIMETHOPRIM 800-160MG (BACTRIM DS) 800-160 MG TAB    Take 1 tablet by mouth 2 (two) times daily.    WARFARIN (COUMADIN) 5 MG TABLET    Take 5 mg by mouth Daily.        Objective Findings:  Vital Signs:  /84   Ht 5' 7" (1.702 m)   Wt 91.4 kg (201 lb 6.4 oz)   BMI 31.54 kg/m²  Body mass index is 31.54 kg/m².    Physical Exam:  Physical Exam  Vitals and nursing note reviewed.   Constitutional:       General: She is not in acute distress.     Appearance: Normal appearance.   HENT:      Mouth/Throat:      Mouth: Mucous membranes are moist.   Cardiovascular:      Rate and Rhythm: Normal rate.   Pulmonary:      Effort: Pulmonary effort is normal.      Breath sounds: No wheezing, rhonchi or rales.   Abdominal:      General: Bowel sounds are normal. There is no distension.      Palpations: Abdomen is soft. There is no mass.      Tenderness: There is abdominal tenderness in the right upper " quadrant. There is no guarding.   Skin:     General: Skin is warm and dry.      Coloration: Skin is not jaundiced or pale.   Neurological:      Mental Status: She is alert and oriented to person, place, and time.   Psychiatric:         Mood and Affect: Mood normal.        Labs:  Lab Results   Component Value Date    WBC 8.21 09/08/2022    HGB 14.3 09/08/2022    HCT 41.7 09/08/2022    MCV 95.0 09/08/2022    RDW 13.0 09/08/2022     09/08/2022    LYMPH 30.5 09/08/2022    LYMPH 2.50 09/08/2022    MONO 9.9 (H) 09/08/2022    EOS 0.09 09/08/2022    BASO 0.03 09/08/2022     Lab Results   Component Value Date     09/08/2022    K 4.0 09/08/2022     09/08/2022    CO2 29 09/08/2022     (H) 09/08/2022    BUN 10 09/08/2022    CREATININE 1.17 (H) 09/08/2022    CALCIUM 9.3 09/08/2022    PROT 6.7 09/08/2022    ALBUMIN 3.7 09/08/2022    BILITOT 1.0 09/08/2022    ALKPHOS 107 09/08/2022    AST 19 09/08/2022    ALT 18 09/08/2022         Imaging: No results found.      Assessment:  Victoria Delarosa is a 67 y.o. female here with:  1. Benign neuroendocrine tumor of stomach    2. HH (hiatus hernia)    3. Nausea    4. Right upper quadrant abdominal pain    5. Constipation, unspecified constipation type          Recommendations:  1. Patient is already scheduled for EGD on August 1st, she is aware of the appointment  2. Do not lay down within 3 hours of eating.  Avoid spicy, greasy foods  Eat 6-8 small meals per day.  Exercise 150 minutes per week  Avoid raw fruits and vegetables  Small amount of protein and fiber at one time  3. Offered gastric emptying study, declines at this time to schedule  4. Consider repeat HIDA scan    Follow up in about 3 months (around 8/22/2023).      Order summary:       Thank you for allowing me to participate in the care of Victoria Delarosa.      MALACHI Vu

## 2023-05-25 ENCOUNTER — HOSPITAL ENCOUNTER (OUTPATIENT)
Dept: RADIOLOGY | Facility: HOSPITAL | Age: 68
Discharge: HOME | End: 2023-05-25
Attending: FAMILY MEDICINE
Payer: MEDICARE

## 2023-05-25 ENCOUNTER — OFFICE VISIT (OUTPATIENT)
Dept: FAMILY MEDICINE | Facility: CLINIC | Age: 68
End: 2023-05-25
Payer: MEDICARE

## 2023-05-25 VITALS
HEART RATE: 69 BPM | OXYGEN SATURATION: 97 % | HEIGHT: 67 IN | SYSTOLIC BLOOD PRESSURE: 102 MMHG | DIASTOLIC BLOOD PRESSURE: 78 MMHG | WEIGHT: 201 LBS | BODY MASS INDEX: 31.55 KG/M2 | TEMPERATURE: 98 F

## 2023-05-25 DIAGNOSIS — R53.1 WEAKNESS: ICD-10-CM

## 2023-05-25 DIAGNOSIS — R29.818 OTHER SYMPTOMS AND SIGNS INVOLVING THE NERVOUS SYSTEM: ICD-10-CM

## 2023-05-25 DIAGNOSIS — R29.818 OTHER SYMPTOMS AND SIGNS INVOLVING THE NERVOUS SYSTEM: Primary | ICD-10-CM

## 2023-05-25 LAB
EKG 12-LEAD: NORMAL
PR INTERVAL: 212
PRT AXES: NORMAL
QRS DURATION: 86
QT/QTC: NORMAL
VENTRICULAR RATE: 59

## 2023-05-25 PROCEDURE — 99213 PR OFFICE/OUTPT VISIT, EST, LEVL III, 20-29 MIN: ICD-10-PCS | Mod: ,,, | Performed by: FAMILY MEDICINE

## 2023-05-25 PROCEDURE — 93010 PR ELECTROCARDIOGRAM REPORT: ICD-10-PCS | Mod: ,,, | Performed by: FAMILY MEDICINE

## 2023-05-25 PROCEDURE — 93005 ELECTROCARDIOGRAM TRACING: CPT | Mod: RHCUB | Performed by: FAMILY MEDICINE

## 2023-05-25 PROCEDURE — 99213 OFFICE O/P EST LOW 20 MIN: CPT | Mod: ,,, | Performed by: FAMILY MEDICINE

## 2023-05-25 PROCEDURE — 70450 CT HEAD/BRAIN W/O DYE: CPT | Mod: TC

## 2023-05-25 PROCEDURE — 93010 ELECTROCARDIOGRAM REPORT: CPT | Mod: ,,, | Performed by: FAMILY MEDICINE

## 2023-05-25 NOTE — PROGRESS NOTES
Tim Burgos MD   [facility]  [address] [place] [address]     PATIENT NAME: Vitcoria Delarosa  : 1955  DATE: 23  MRN: [id number]      Billing Provider: Tim Burgos MD  Level of Service: GA OFFICE/OUTPT VISIT, EST, LEVL III, 20-29 MIN  Patient PCP Information       Provider PCP Type    Tim Burgos MD General            Reason for Visit / Chief Complaint: Numbness (Numbness to right arm and right side of face states its started this am. Weak and tiredness kept small child since last wednesday and has had vacation bible school all week.)         History of Present Illness / Problem Focused Workflow     Victoria Delarosa presents to the clinic with Numbness (Numbness to right arm and right side of face states its started this am. Weak and tiredness kept small child since last wednesday and has had vacation bible school all week.)     HPI    Review of Systems     Review of Systems   Constitutional:  Negative for activity change, appetite change, fatigue and fever.   HENT:  Negative for nasal congestion, ear pain, hearing loss, sinus pressure/congestion and sore throat.    Respiratory:  Negative for cough, chest tightness and shortness of breath.    Cardiovascular:  Negative for chest pain and palpitations.   Gastrointestinal:  Negative for abdominal pain and fecal incontinence.   Genitourinary:  Negative for bladder incontinence and difficulty urinating.   Musculoskeletal:  Negative for arthralgias.   Integumentary:  Negative for rash.   Neurological:  Positive for numbness (right arm). Negative for dizziness, weakness and headaches.      Medical / Social / Family History     Past Medical History:   Diagnosis Date    Anxiety state     Benign neuroendocrine tumor of stomach 2022    BMI 32.0-32.9,adult     Bronchitis     CHF (congestive heart failure)     Cobalamin deficiency     Deep vein thrombosis     Depressive disorder     General anesthetics  causing adverse effect in therapeutic use     GERD (gastroesophageal reflux disease)     Heart attack     20 yrs ago    Hypertension     Hypothyroidism     Lower extremity edema     Neuropathy     Osteoarthritis of both knees     Pain in left leg     Pain in right leg     Peripheral edema     Pulmonary embolism     Stroke     Varicose veins of bilateral lower extremities with pain     Vitamin D deficiency        Past Surgical History:   Procedure Laterality Date    BILATERAL TUBAL LIGATION      CARDIAC CATHETERIZATION      COLONOSCOPY      knee scope Left     THYROIDECTOMY, PARTIAL      TONSILLECTOMY, ADENOIDECTOMY      TOTAL KNEE ARTHROPLASTY Right     TOTAL KNEE ARTHROPLASTY Right     TUBAL LIGATION      VAGINAL DELIVERY      x 3       Social History  Victoria Delarosa  reports that she has never smoked. She has never used smokeless tobacco. She reports that she does not drink alcohol and does not use drugs.    Family History  Victoria Delarosa  family history includes Arthritis in her brother and sister; Heart disease in her brother; Stroke in her brother.    Medications and Allergies     Medications  Outpatient Medications Marked as Taking for the 5/25/23 encounter (Office Visit) with Tim Burgos MD   Medication Sig Dispense Refill    cyanocobalamin 1,000 mcg/mL injection INJECT 1 ML EVERY MONTH 1 mL 11    ezetimibe (ZETIA) 10 mg tablet Take 10 mg by mouth once daily.      furosemide (LASIX) 20 MG tablet Take 1 tablet (20 mg total) by mouth once daily. 90 tablet 1    ketorolac 0.5% (ACULAR) 0.5 % Drop       levothyroxine (SYNTHROID) 25 MCG tablet Take 1 tablet (25 mcg total) by mouth once daily. 90 tablet 1    losartan (COZAAR) 50 MG tablet Take 1 tablet (50 mg total) by mouth once daily. 90 tablet 1    magnesium oxide (MAG-OX) 400 mg (241.3 mg magnesium) tablet Take 1 tablet by mouth once daily.      moxifloxacin (VIGAMOX) 0.5 % ophthalmic solution       oxybutynin (DITROPAN) 5 MG Tab Take 1 tablet by mouth 2  (two) times daily.      oxybutynin (DITROPAN) 5 MG Tab Take 1 tablet (5 mg total) by mouth 2 (two) times daily. 180 tablet 2    potassium chloride (KLOR-CON) 10 MEQ TbSR Take 1 tablet (10 mEq total) by mouth once daily. 90 tablet 1    prednisoLONE acetate (PRED FORTE) 1 % DrpS Place 1 drop into the left eye once daily.      ranolazine (RANEXA) 500 MG Tb12 Take 500 mg by mouth 2 (two) times daily.      sertraline (ZOLOFT) 100 MG tablet Take 1 tablet (100 mg total) by mouth once daily. 90 tablet 1    spironolactone (ALDACTONE) 25 MG tablet Take 1 tablet (25 mg total) by mouth once daily. 90 tablet 1    warfarin (COUMADIN) 5 MG tablet Take 5 mg by mouth Daily.         Allergies  Review of patient's allergies indicates:   Allergen Reactions    Lisinopril      Cough      Statins-hmg-coa reductase inhibitors Hives and Itching       Physical Examination     Vitals:    05/25/23 1333   BP: 102/78   Pulse: 69   Temp: 97.8 °F (36.6 °C)     Physical Exam  Constitutional:       General: She is not in acute distress.     Appearance: She is not ill-appearing.   HENT:      Head: Normocephalic and atraumatic.      Right Ear: Tympanic membrane and ear canal normal.      Left Ear: Tympanic membrane and ear canal normal.      Nose: Nose normal. No congestion or rhinorrhea.   Eyes:      Pupils: Pupils are equal, round, and reactive to light.   Cardiovascular:      Rate and Rhythm: Normal rate and regular rhythm.      Pulses: Normal pulses.      Heart sounds: No murmur heard.  Pulmonary:      Effort: No respiratory distress.      Breath sounds: No wheezing, rhonchi or rales.   Abdominal:      General: Bowel sounds are normal.      Palpations: Abdomen is soft.      Tenderness: There is no abdominal tenderness.      Hernia: No hernia is present.   Musculoskeletal:      Cervical back: Normal range of motion and neck supple.   Lymphadenopathy:      Cervical: No cervical adenopathy.   Skin:     General: Skin is warm and dry.   Neurological:       Mental Status: She is alert.      Cranial Nerves: No cranial nerve deficit (right hand and face. strength intact grossly).      Sensory: Sensory deficit present.   Psychiatric:         Behavior: Behavior normal.         Thought Content: Thought content normal.        Assessment and Plan (including Health Maintenance)   :    Plan:         Health Maintenance Due   Topic Date Due    Hepatitis C Screening  Never done    Hemoglobin A1c (Diabetic Prevention Screening)  Never done    DEXA Scan  06/14/2021    COVID-19 Vaccine (4 - Booster for Moderna series) 02/25/2022    Colorectal Cancer Screening  05/28/2023       Problem List Items Addressed This Visit    None  Visit Diagnoses       Other symptoms and signs involving the nervous system    -  Primary    Relevant Orders    CT Head Without Contrast          Other symptoms and signs involving the nervous system  -     CT Head Without Contrast; Future; Expected date: 05/25/2023       Health Maintenance Topics with due status: Not Due       Topic Last Completion Date    Influenza Vaccine 12/31/2021    Lipid Panel 09/08/2022    Mammogram 03/29/2023       Procedures     Future Appointments   Date Time Provider Department Center   6/13/2023  1:00 PM JAMES Pandya [facility]   8/1/2023  7:30 AM [facility] GI ROOM 02 [facility]   8/7/2023  8:00 AM JAMES Randle [facility] GASTR [facility]   6/3/2024 11:20 AM [facility] MAMMO1 [facility]MO [facility]        No follow-ups on file.  Given the large homunculs distribution involved and minimal if any motor involement, I feel this is more likely atypical migraine type symptoms, but definitely warrants a CT.  EKG done today -grossly normal    Signature:  Tim Burgos MD  [facility]  [address] [place] [address]  [phone] Phone  [phone] Fax    Date of encounter: 5/25/23

## 2023-07-07 NOTE — TELEPHONE ENCOUNTER
----- Message from Isaura Martinez sent at 7/7/2023 10:12 AM CDT -----  Losartan refill. Elizabeth  PT# [phone]

## 2023-07-10 RX ORDER — LOSARTAN POTASSIUM 50 MG/1
50 TABLET ORAL DAILY
Qty: 30 TABLET | Refills: 0 | Status: SHIPPED | OUTPATIENT
Start: 2023-07-10 | End: 2023-07-17 | Stop reason: SDUPTHER

## 2023-07-17 ENCOUNTER — OFFICE VISIT (OUTPATIENT)
Dept: FAMILY MEDICINE | Facility: CLINIC | Age: 68
End: 2023-07-17
Payer: MEDICARE

## 2023-07-17 VITALS
HEIGHT: 67 IN | TEMPERATURE: 98 F | WEIGHT: 202.19 LBS | HEART RATE: 64 BPM | DIASTOLIC BLOOD PRESSURE: 74 MMHG | BODY MASS INDEX: 31.73 KG/M2 | SYSTOLIC BLOOD PRESSURE: 110 MMHG | OXYGEN SATURATION: 98 %

## 2023-07-17 DIAGNOSIS — G89.29 CHRONIC BILATERAL LOW BACK PAIN WITH LEFT-SIDED SCIATICA: ICD-10-CM

## 2023-07-17 DIAGNOSIS — I10 HYPERTENSION, UNSPECIFIED TYPE: Primary | ICD-10-CM

## 2023-07-17 DIAGNOSIS — E03.9 HYPOTHYROIDISM, UNSPECIFIED TYPE: ICD-10-CM

## 2023-07-17 DIAGNOSIS — M54.42 CHRONIC BILATERAL LOW BACK PAIN WITH LEFT-SIDED SCIATICA: ICD-10-CM

## 2023-07-17 DIAGNOSIS — Z11.59 SCREENING FOR VIRAL DISEASE: ICD-10-CM

## 2023-07-17 LAB
ALBUMIN SERPL BCP-MCNC: 3.5 G/DL (ref 3.5–5)
ALBUMIN/GLOB SERPL: 1.2 {RATIO}
ALP SERPL-CCNC: 87 U/L (ref 55–142)
ALT SERPL W P-5'-P-CCNC: 20 U/L (ref 13–56)
ANION GAP SERPL CALCULATED.3IONS-SCNC: 6 MMOL/L (ref 7–16)
AST SERPL W P-5'-P-CCNC: 26 U/L (ref 15–37)
BASOPHILS # BLD AUTO: 0.03 K/UL (ref 0–0.2)
BASOPHILS NFR BLD AUTO: 0.4 % (ref 0–1)
BILIRUB SERPL-MCNC: 1 MG/DL (ref ?–1.2)
BUN SERPL-MCNC: 13 MG/DL (ref 7–18)
BUN/CREAT SERPL: 12 (ref 6–20)
CALCIUM SERPL-MCNC: 9.1 MG/DL (ref 8.5–10.1)
CHLORIDE SERPL-SCNC: 109 MMOL/L (ref 98–107)
CHOLEST SERPL-MCNC: 185 MG/DL (ref 0–200)
CHOLEST/HDLC SERPL: 5 {RATIO}
CO2 SERPL-SCNC: 31 MMOL/L (ref 21–32)
CREAT SERPL-MCNC: 1.11 MG/DL (ref 0.55–1.02)
DIFFERENTIAL METHOD BLD: ABNORMAL
EGFR (NO RACE VARIABLE) (RUSH/TITUS): 54 ML/MIN/1.73M2
EOSINOPHIL # BLD AUTO: 0.12 K/UL (ref 0–0.5)
EOSINOPHIL NFR BLD AUTO: 1.4 % (ref 1–4)
ERYTHROCYTE [DISTWIDTH] IN BLOOD BY AUTOMATED COUNT: 12.9 % (ref 11.5–14.5)
GLOBULIN SER-MCNC: 2.9 G/DL (ref 2–4)
GLUCOSE SERPL-MCNC: 106 MG/DL (ref 74–106)
HCT VFR BLD AUTO: 39.5 % (ref 38–47)
HCV AB SER QL: NORMAL
HDLC SERPL-MCNC: 37 MG/DL (ref 40–60)
HGB BLD-MCNC: 13.4 G/DL (ref 12–16)
IMM GRANULOCYTES # BLD AUTO: 0.03 K/UL (ref 0–0.04)
IMM GRANULOCYTES NFR BLD: 0.4 % (ref 0–0.4)
LDLC SERPL CALC-MCNC: 109 MG/DL
LDLC/HDLC SERPL: 2.9 {RATIO}
LYMPHOCYTES # BLD AUTO: 2.33 K/UL (ref 1–4.8)
LYMPHOCYTES NFR BLD AUTO: 27.9 % (ref 27–41)
MCH RBC QN AUTO: 31.8 PG (ref 27–31)
MCHC RBC AUTO-ENTMCNC: 33.9 G/DL (ref 32–36)
MCV RBC AUTO: 93.8 FL (ref 80–96)
MONOCYTES # BLD AUTO: 0.65 K/UL (ref 0–0.8)
MONOCYTES NFR BLD AUTO: 7.8 % (ref 2–6)
MPC BLD CALC-MCNC: 9.5 FL (ref 9.4–12.4)
NEUTROPHILS # BLD AUTO: 5.19 K/UL (ref 1.8–7.7)
NEUTROPHILS NFR BLD AUTO: 62.1 % (ref 53–65)
NONHDLC SERPL-MCNC: 148 MG/DL
NRBC # BLD AUTO: 0 X10E3/UL
NRBC, AUTO (.00): 0 %
PLATELET # BLD AUTO: 159 K/UL (ref 150–400)
POTASSIUM SERPL-SCNC: 4 MMOL/L (ref 3.5–5.1)
PROT SERPL-MCNC: 6.4 G/DL (ref 6.4–8.2)
RBC # BLD AUTO: 4.21 M/UL (ref 4.2–5.4)
SODIUM SERPL-SCNC: 142 MMOL/L (ref 136–145)
T4 FREE SERPL-MCNC: 1.11 NG/DL (ref 0.76–1.46)
TRIGL SERPL-MCNC: 193 MG/DL (ref 35–150)
TSH SERPL DL<=0.005 MIU/L-ACNC: 1.51 UIU/ML (ref 0.36–3.74)
VLDLC SERPL-MCNC: 39 MG/DL
WBC # BLD AUTO: 8.35 K/UL (ref 4.5–11)

## 2023-07-17 PROCEDURE — 99214 PR OFFICE/OUTPT VISIT, EST, LEVL IV, 30-39 MIN: ICD-10-PCS | Mod: ,,, | Performed by: FAMILY MEDICINE

## 2023-07-17 PROCEDURE — 84443 TSH: ICD-10-PCS | Mod: ,,, | Performed by: CLINICAL MEDICAL LABORATORY

## 2023-07-17 PROCEDURE — 80061 LIPID PANEL: CPT | Mod: ,,, | Performed by: CLINICAL MEDICAL LABORATORY

## 2023-07-17 PROCEDURE — 84439 T4, FREE: ICD-10-PCS | Mod: ,,, | Performed by: CLINICAL MEDICAL LABORATORY

## 2023-07-17 PROCEDURE — 85025 COMPLETE CBC W/AUTO DIFF WBC: CPT | Mod: ,,, | Performed by: CLINICAL MEDICAL LABORATORY

## 2023-07-17 PROCEDURE — 80053 COMPREHEN METABOLIC PANEL: CPT | Mod: ,,, | Performed by: CLINICAL MEDICAL LABORATORY

## 2023-07-17 PROCEDURE — 80061 LIPID PANEL: ICD-10-PCS | Mod: ,,, | Performed by: CLINICAL MEDICAL LABORATORY

## 2023-07-17 PROCEDURE — 85025 CBC WITH DIFFERENTIAL: ICD-10-PCS | Mod: ,,, | Performed by: CLINICAL MEDICAL LABORATORY

## 2023-07-17 PROCEDURE — 80053 COMPREHENSIVE METABOLIC PANEL: ICD-10-PCS | Mod: ,,, | Performed by: CLINICAL MEDICAL LABORATORY

## 2023-07-17 PROCEDURE — 84439 ASSAY OF FREE THYROXINE: CPT | Mod: ,,, | Performed by: CLINICAL MEDICAL LABORATORY

## 2023-07-17 PROCEDURE — 99214 OFFICE O/P EST MOD 30 MIN: CPT | Mod: ,,, | Performed by: FAMILY MEDICINE

## 2023-07-17 PROCEDURE — 86803 HEPATITIS C ANTIBODY: ICD-10-PCS | Mod: ,,, | Performed by: CLINICAL MEDICAL LABORATORY

## 2023-07-17 PROCEDURE — 84443 ASSAY THYROID STIM HORMONE: CPT | Mod: ,,, | Performed by: CLINICAL MEDICAL LABORATORY

## 2023-07-17 PROCEDURE — 96372 PR INJECTION,THERAP/PROPH/DIAG2ST, IM OR SUBCUT: ICD-10-PCS | Mod: ,,, | Performed by: FAMILY MEDICINE

## 2023-07-17 PROCEDURE — 86803 HEPATITIS C AB TEST: CPT | Mod: ,,, | Performed by: CLINICAL MEDICAL LABORATORY

## 2023-07-17 PROCEDURE — 96372 THER/PROPH/DIAG INJ SC/IM: CPT | Mod: ,,, | Performed by: FAMILY MEDICINE

## 2023-07-17 RX ORDER — LOSARTAN POTASSIUM 50 MG/1
50 TABLET ORAL DAILY
Qty: 90 TABLET | Refills: 1 | Status: SHIPPED | OUTPATIENT
Start: 2023-07-17 | End: 2024-02-21 | Stop reason: SDUPTHER

## 2023-07-17 RX ORDER — POTASSIUM CHLORIDE 750 MG/1
10 TABLET, EXTENDED RELEASE ORAL DAILY
Qty: 90 TABLET | Refills: 1 | Status: SHIPPED | OUTPATIENT
Start: 2023-07-17 | End: 2024-02-21 | Stop reason: SDUPTHER

## 2023-07-17 RX ORDER — FUROSEMIDE 20 MG/1
20 TABLET ORAL DAILY
Qty: 90 TABLET | Refills: 1 | Status: SHIPPED | OUTPATIENT
Start: 2023-07-17 | End: 2024-02-21 | Stop reason: SDUPTHER

## 2023-07-17 RX ORDER — DEXAMETHASONE SODIUM PHOSPHATE 4 MG/ML
4 INJECTION, SOLUTION INTRA-ARTICULAR; INTRALESIONAL; INTRAMUSCULAR; INTRAVENOUS; SOFT TISSUE
Status: COMPLETED | OUTPATIENT
Start: 2023-07-17 | End: 2023-07-17

## 2023-07-17 RX ORDER — SERTRALINE HYDROCHLORIDE 100 MG/1
100 TABLET, FILM COATED ORAL DAILY
Qty: 90 TABLET | Refills: 1 | Status: SHIPPED | OUTPATIENT
Start: 2023-07-17 | End: 2024-01-22

## 2023-07-17 RX ORDER — LEVOTHYROXINE SODIUM 25 UG/1
25 TABLET ORAL DAILY
Qty: 90 TABLET | Refills: 1 | Status: SHIPPED | OUTPATIENT
Start: 2023-07-17 | End: 2024-02-21 | Stop reason: SDUPTHER

## 2023-07-17 RX ORDER — METHYLPREDNISOLONE ACETATE 80 MG/ML
40 INJECTION, SUSPENSION INTRA-ARTICULAR; INTRALESIONAL; INTRAMUSCULAR; SOFT TISSUE
Status: COMPLETED | OUTPATIENT
Start: 2023-07-17 | End: 2023-07-17

## 2023-07-17 RX ORDER — SPIRONOLACTONE 25 MG/1
25 TABLET ORAL DAILY
Qty: 90 TABLET | Refills: 1 | Status: SHIPPED | OUTPATIENT
Start: 2023-07-17 | End: 2024-01-22 | Stop reason: SDUPTHER

## 2023-07-17 RX ORDER — KETOROLAC TROMETHAMINE 30 MG/ML
30 INJECTION, SOLUTION INTRAMUSCULAR; INTRAVENOUS
Status: COMPLETED | OUTPATIENT
Start: 2023-07-17 | End: 2023-07-17

## 2023-07-17 RX ADMIN — DEXAMETHASONE SODIUM PHOSPHATE 4 MG: 4 INJECTION, SOLUTION INTRA-ARTICULAR; INTRALESIONAL; INTRAMUSCULAR; INTRAVENOUS; SOFT TISSUE at 11:07

## 2023-07-17 RX ADMIN — METHYLPREDNISOLONE ACETATE 40 MG: 80 INJECTION, SUSPENSION INTRA-ARTICULAR; INTRALESIONAL; INTRAMUSCULAR; SOFT TISSUE at 11:07

## 2023-07-17 RX ADMIN — KETOROLAC TROMETHAMINE 30 MG: 30 INJECTION, SOLUTION INTRAMUSCULAR; INTRAVENOUS at 11:07

## 2023-07-17 NOTE — PROGRESS NOTES
Tim Burgos MD   [facility]  [address] [place] [address]     PATIENT NAME: Victoria Delarosa  : 1955  DATE: 23  MRN: [id number]      Billing Provider: Tim Burgos MD  Level of Service: MI OFFICE/OUTPT VISIT, EST, LEVL IV, 30-39 MIN  Patient PCP Information       Provider PCP Type    Tim Burgos MD General            Reason for Visit / Chief Complaint: Headache (Headaches off and on for about a week. Left side mostly starts in the front and goes to the back ), Neck Pain (Chronic neck pain ), Low-back Pain (Low back pain to both sides. Patient states she feels like the right side is catching, and just painful to the left . ), and Hypertension (Check up, labs, refills. No energy)         History of Present Illness / Problem Focused Workflow     Victoria Dlearosa presents to the clinic with Headache (Headaches off and on for about a week. Left side mostly starts in the front and goes to the back ), Neck Pain (Chronic neck pain ), Low-back Pain (Low back pain to both sides. Patient states she feels like the right side is catching, and just painful to the left . ), and Hypertension (Check up, labs, refills. No energy)     HPI    Review of Systems     Review of Systems   Constitutional:  Positive for fatigue. Negative for activity change, appetite change and fever.   HENT:  Negative for nasal congestion, ear pain, hearing loss, sinus pressure/congestion and sore throat.    Respiratory:  Negative for cough, chest tightness and shortness of breath.    Cardiovascular:  Negative for chest pain and palpitations.   Gastrointestinal:  Negative for abdominal pain and fecal incontinence.   Genitourinary:  Negative for bladder incontinence and difficulty urinating.   Musculoskeletal:  Positive for back pain, leg pain, neck pain and neck stiffness. Negative for arthralgias.   Integumentary:  Negative for rash.   Neurological:  Negative for dizziness and headaches.       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    BMI 32.0-32.9,adult     Bronchitis     CHF (congestive heart failure)     Cobalamin deficiency     Deep vein thrombosis     Depressive disorder     General anesthetics causing adverse effect in therapeutic use     GERD (gastroesophageal reflux disease)     Heart attack     20 yrs ago    Hypertension     Hypothyroidism     Lower extremity edema     Neuropathy     Osteoarthritis of both knees     Pain in left leg     Pain in right leg     Peripheral edema     Pulmonary embolism     Stroke     Varicose veins of bilateral lower extremities with pain     Vitamin D deficiency        Past Surgical History:   Procedure Laterality Date    BILATERAL TUBAL LIGATION      CARDIAC CATHETERIZATION      COLONOSCOPY      knee scope Left     THYROIDECTOMY, PARTIAL      TONSILLECTOMY, ADENOIDECTOMY      TOTAL KNEE ARTHROPLASTY Right     TOTAL KNEE ARTHROPLASTY Right     TUBAL LIGATION      VAGINAL DELIVERY      x 3       Social History  Victoria Delarosa  reports that she has never smoked. She has never used smokeless tobacco. She reports that she does not drink alcohol and does not use drugs.    Family History  Victoria Delarosa  family history includes Arthritis in her brother and sister; Heart disease in her brother; Stroke in her brother.    Medications and Allergies     Medications  Outpatient Medications Marked as Taking for the 7/17/23 encounter (Office Visit) with Tim Burgos MD   Medication Sig Dispense Refill    ezetimibe (ZETIA) 10 mg tablet Take 10 mg by mouth once daily.      magnesium oxide (MAG-OX) 400 mg (241.3 mg magnesium) tablet Take 1 tablet by mouth once daily.      oxybutynin (DITROPAN) 5 MG Tab Take 1 tablet by mouth 2 (two) times daily.      oxybutynin (DITROPAN) 5 MG Tab Take 1 tablet (5 mg total) by mouth 2 (two) times daily. 180 tablet 2    potassium  chloride SA (K-DUR,KLOR-CON M) 10 MEQ tablet Take 1 tablet (10 mEq total) by mouth once daily. 90 tablet 1    prednisoLONE acetate (PRED FORTE) 1 % DrpS Place 1 drop into the left eye once daily.      ranolazine (RANEXA) 500 MG Tb12 Take 500 mg by mouth 2 (two) times daily.      warfarin (COUMADIN) 5 MG tablet Take 5 mg by mouth Daily.      [DISCONTINUED] furosemide (LASIX) 20 MG tablet Take 1 tablet (20 mg total) by mouth once daily. 90 tablet 1    [DISCONTINUED] levothyroxine (SYNTHROID) 25 MCG tablet Take 1 tablet (25 mcg total) by mouth once daily. 90 tablet 1    [DISCONTINUED] losartan (COZAAR) 50 MG tablet Take 1 tablet (50 mg total) by mouth once daily. 30 tablet 0    [DISCONTINUED] potassium chloride (KLOR-CON) 10 MEQ TbSR Take 1 tablet (10 mEq total) by mouth once daily. 90 tablet 1    [DISCONTINUED] sertraline (ZOLOFT) 100 MG tablet Take 1 tablet (100 mg total) by mouth once daily. 90 tablet 1    [DISCONTINUED] spironolactone (ALDACTONE) 25 MG tablet Take 1 tablet (25 mg total) by mouth once daily. 90 tablet 1     Current Facility-Administered Medications for the 7/17/23 encounter (Office Visit) with Tim Burgos MD   Medication Dose Route Frequency Provider Last Rate Last Admin    [COMPLETED] dexAMETHasone injection 4 mg  4 mg Intramuscular 1 time in Clinic/HOD Tim Burgos MD   4 mg at 07/17/23 1122    [COMPLETED] ketorolac injection 30 mg  30 mg Intramuscular 1 time in Clinic/HOD Tim Burgos MD   30 mg at 07/17/23 1122    [COMPLETED] methylPREDNISolone acetate injection 40 mg  40 mg Intramuscular 1 time in Clinic/HOD iTm Burgos MD   40 mg at 07/17/23 1122       Allergies  Review of patient's allergies indicates:   Allergen Reactions    Lisinopril      Cough      Statins-hmg-coa reductase inhibitors Hives and Itching       Physical Examination     Vitals:    07/17/23 1057   BP: 110/74   Pulse: 64   Temp: 97.7 °F (36.5 °C)     Physical Exam  Constitutional:        General: She is not in acute distress.     Appearance: She is not ill-appearing.   HENT:      Head: Normocephalic and atraumatic.      Right Ear: Tympanic membrane and ear canal normal.      Left Ear: Tympanic membrane and ear canal normal.      Nose: Nose normal. No congestion or rhinorrhea.   Eyes:      Pupils: Pupils are equal, round, and reactive to light.   Cardiovascular:      Rate and Rhythm: Normal rate and regular rhythm.      Pulses: Normal pulses.      Heart sounds: No murmur heard.  Pulmonary:      Effort: No respiratory distress.      Breath sounds: No wheezing, rhonchi or rales.   Abdominal:      General: Bowel sounds are normal.      Palpations: Abdomen is soft.      Tenderness: There is no abdominal tenderness.      Hernia: No hernia is present.   Musculoskeletal:         General: Tenderness present.      Cervical back: Normal range of motion and neck supple.      Lumbar back: Tenderness present. Decreased range of motion. Positive left straight leg raise test.   Lymphadenopathy:      Cervical: No cervical adenopathy.   Skin:     General: Skin is warm and dry.   Neurological:      Mental Status: She is alert.   Psychiatric:         Behavior: Behavior normal.         Thought Content: Thought content normal.        Assessment and Plan (including Health Maintenance)   :    Plan:         Health Maintenance Due   Topic Date Due    Hepatitis C Screening  Never done    TETANUS VACCINE  Never done    Hemoglobin A1c (Diabetic Prevention Screening)  Never done    Shingles Vaccine (1 of 2) Never done    Pneumococcal Vaccines (Age 65+) (3 - PPSV23 if available, else PCV20) 06/04/2020    DEXA Scan  06/14/2021    COVID-19 Vaccine (4 - Moderna series) 02/25/2022       Problem List Items Addressed This Visit          Cardiac/Vascular    Hypertension - Primary    Relevant Orders    CBC Auto Differential    Comprehensive Metabolic Panel    Lipid Panel       Endocrine    Hypothyroidism    Relevant Orders    T4, Free     TSH     Other Visit Diagnoses       Screening for viral disease        Chronic bilateral low back pain with left-sided sciatica        Relevant Medications    dexAMETHasone injection 4 mg (Completed)    methylPREDNISolone acetate injection 40 mg (Completed)    ketorolac injection 30 mg (Completed)    Other Relevant Orders    Ambulatory referral/consult to Physical/Occupational Therapy          Hypertension, unspecified type  -     CBC Auto Differential; Future; Expected date: 07/17/2023  -     Comprehensive Metabolic Panel; Future; Expected date: 07/17/2023  -     Lipid Panel; Future; Expected date: 07/17/2023    Hypothyroidism, unspecified type  -     T4, Free; Future; Expected date: 07/17/2023  -     TSH; Future; Expected date: 07/17/2023    Screening for viral disease  -     Hepatitis C Antibody    Chronic bilateral low back pain with left-sided sciatica  -     dexAMETHasone injection 4 mg  -     methylPREDNISolone acetate injection 40 mg  -     ketorolac injection 30 mg  -     Ambulatory referral/consult to Physical/Occupational Therapy; Future; Expected date: 07/24/2023    Other orders  -     furosemide (LASIX) 20 MG tablet; Take 1 tablet (20 mg total) by mouth once daily.  Dispense: 90 tablet; Refill: 1  -     levothyroxine (SYNTHROID) 25 MCG tablet; Take 1 tablet (25 mcg total) by mouth once daily.  Dispense: 90 tablet; Refill: 1  -     losartan (COZAAR) 50 MG tablet; Take 1 tablet (50 mg total) by mouth once daily.  Dispense: 90 tablet; Refill: 1  -     potassium chloride (KLOR-CON) 10 MEQ TbSR; Take 1 tablet (10 mEq total) by mouth once daily.  Dispense: 90 tablet; Refill: 1  -     sertraline (ZOLOFT) 100 MG tablet; Take 1 tablet (100 mg total) by mouth once daily.  Dispense: 90 tablet; Refill: 1  -     spironolactone (ALDACTONE) 25 MG tablet; Take 1 tablet (25 mg total) by mouth once daily.  Dispense: 90 tablet; Refill: 1       Health Maintenance Topics with due status: Not Due       Topic Last Completion  Date    Influenza Vaccine 12/31/2021    Lipid Panel 09/08/2022    Colorectal Cancer Screening 11/28/2022    Mammogram 03/29/2023       Procedures     Future Appointments   Date Time Provider Department Center   8/1/2023  7:30 AM [facility] GI ROOM 02 [facility] ENDO [facility]   8/7/2023  8:00 AM JAMES Randle [facility] GASTR [facility]   6/3/2024 11:20 AM [facility] MAMMO1 [facility] MAMMO [facility]        No follow-ups on file.       Signature:  Tim Burgos MD  [facility]  [address] [place] [address]  [phone] Phone  [phone] Fax    Date of encounter: 7/17/23

## 2023-07-25 DIAGNOSIS — R10.11 RIGHT UPPER QUADRANT ABDOMINAL PAIN: ICD-10-CM

## 2023-08-01 ENCOUNTER — ANESTHESIA EVENT (OUTPATIENT)
Dept: GASTROENTEROLOGY | Facility: HOSPITAL | Age: 68
End: 2023-08-01
Payer: MEDICARE

## 2023-08-01 ENCOUNTER — ANESTHESIA (OUTPATIENT)
Dept: GASTROENTEROLOGY | Facility: HOSPITAL | Age: 68
End: 2023-08-01
Payer: MEDICARE

## 2023-08-01 ENCOUNTER — HOSPITAL ENCOUNTER (OUTPATIENT)
Dept: GASTROENTEROLOGY | Facility: HOSPITAL | Age: 68
Discharge: HOME | End: 2023-08-01
Attending: INTERNAL MEDICINE
Payer: MEDICARE

## 2023-08-01 VITALS
OXYGEN SATURATION: 96 % | HEART RATE: 56 BPM | BODY MASS INDEX: 30.61 KG/M2 | TEMPERATURE: 98 F | HEIGHT: 67 IN | SYSTOLIC BLOOD PRESSURE: 110 MMHG | WEIGHT: 195 LBS | RESPIRATION RATE: 14 BRPM | DIASTOLIC BLOOD PRESSURE: 60 MMHG

## 2023-08-01 DIAGNOSIS — D3A.8 BENIGN NEUROENDOCRINE TUMOR OF STOMACH: ICD-10-CM

## 2023-08-01 DIAGNOSIS — I85.00 IDIOPATHIC ESOPHAGEAL VARICES WITHOUT BLEEDING: Primary | ICD-10-CM

## 2023-08-01 DIAGNOSIS — K44.9 HH (HIATUS HERNIA): ICD-10-CM

## 2023-08-01 DIAGNOSIS — R10.11 RIGHT UPPER QUADRANT ABDOMINAL PAIN: ICD-10-CM

## 2023-08-01 PROCEDURE — 88360 TUMOR IMMUNOHISTOCHEM/MANUAL: CPT | Mod: 26,GZ,, | Performed by: PATHOLOGY

## 2023-08-01 PROCEDURE — 37000008 HC ANESTHESIA 1ST 15 MINUTES

## 2023-08-01 PROCEDURE — 88341 IMHCHEM/IMCYTCHM EA ADD ANTB: CPT | Mod: 26,GZ,59, | Performed by: PATHOLOGY

## 2023-08-01 PROCEDURE — D9220A PRA ANESTHESIA: ICD-10-PCS | Mod: ,,, | Performed by: NURSE ANESTHETIST, CERTIFIED REGISTERED

## 2023-08-01 PROCEDURE — 88342 SURGICAL PATHOLOGY: ICD-10-PCS | Mod: 26,XU,, | Performed by: PATHOLOGY

## 2023-08-01 PROCEDURE — 88305 TISSUE EXAM BY PATHOLOGIST: CPT | Mod: 26,,, | Performed by: PATHOLOGY

## 2023-08-01 PROCEDURE — 88305 SURGICAL PATHOLOGY: ICD-10-PCS | Mod: 26,,, | Performed by: PATHOLOGY

## 2023-08-01 PROCEDURE — 27201423 OPTIME MED/SURG SUP & DEVICES STERILE SUPPLY

## 2023-08-01 PROCEDURE — 63600175 PHARM REV CODE 636 W HCPCS: Performed by: NURSE ANESTHETIST, CERTIFIED REGISTERED

## 2023-08-01 PROCEDURE — 27000284 HC CANNULA NASAL: Performed by: NURSE ANESTHETIST, CERTIFIED REGISTERED

## 2023-08-01 PROCEDURE — 88360 SURGICAL PATHOLOGY: ICD-10-PCS | Mod: 26,GZ,, | Performed by: PATHOLOGY

## 2023-08-01 PROCEDURE — 88305 TISSUE EXAM BY PATHOLOGIST: CPT | Mod: TC,SUR | Performed by: INTERNAL MEDICINE

## 2023-08-01 PROCEDURE — 25000003 PHARM REV CODE 250: Performed by: NURSE ANESTHETIST, CERTIFIED REGISTERED

## 2023-08-01 PROCEDURE — 43239 EGD BIOPSY SINGLE/MULTIPLE: CPT | Mod: ,,, | Performed by: INTERNAL MEDICINE

## 2023-08-01 PROCEDURE — 88341 SURGICAL PATHOLOGY: ICD-10-PCS | Mod: 26,GZ,59, | Performed by: PATHOLOGY

## 2023-08-01 PROCEDURE — 88342 IMHCHEM/IMCYTCHM 1ST ANTB: CPT | Mod: 26,XU,, | Performed by: PATHOLOGY

## 2023-08-01 PROCEDURE — D9220A PRA ANESTHESIA: Mod: ,,, | Performed by: NURSE ANESTHETIST, CERTIFIED REGISTERED

## 2023-08-01 PROCEDURE — 43239 PR EGD, FLEX, W/BIOPSY, SGL/MULTI: ICD-10-PCS | Mod: ,,, | Performed by: INTERNAL MEDICINE

## 2023-08-01 PROCEDURE — 43239 EGD BIOPSY SINGLE/MULTIPLE: CPT | Performed by: INTERNAL MEDICINE

## 2023-08-01 RX ORDER — PROPOFOL 10 MG/ML
VIAL (ML) INTRAVENOUS
Status: DISCONTINUED | OUTPATIENT
Start: 2023-08-01 | End: 2023-08-01

## 2023-08-01 RX ORDER — OXYBUTYNIN CHLORIDE 5 MG/1
5 TABLET ORAL 2 TIMES DAILY
Qty: 90 TABLET | Refills: 1 | Status: SHIPPED | OUTPATIENT
Start: 2023-08-01 | End: 2024-07-31

## 2023-08-01 RX ORDER — LIDOCAINE HYDROCHLORIDE 20 MG/ML
INJECTION INTRAVENOUS
Status: DISCONTINUED | OUTPATIENT
Start: 2023-08-01 | End: 2023-08-01

## 2023-08-01 RX ORDER — SODIUM CHLORIDE 0.9 % (FLUSH) 0.9 %
10 SYRINGE (ML) INJECTION
Status: CANCELLED | OUTPATIENT
Start: 2023-08-01

## 2023-08-01 RX ADMIN — PROPOFOL 50 MG: 10 INJECTION, EMULSION INTRAVENOUS at 09:08

## 2023-08-01 RX ADMIN — PROPOFOL 120 MG: 10 INJECTION, EMULSION INTRAVENOUS at 09:08

## 2023-08-01 RX ADMIN — SODIUM CHLORIDE: 9 INJECTION, SOLUTION INTRAVENOUS at 09:08

## 2023-08-01 RX ADMIN — LIDOCAINE HYDROCHLORIDE 75 MG: 20 INJECTION, SOLUTION INTRAVENOUS at 09:08

## 2023-08-01 NOTE — DISCHARGE INSTRUCTIONS
Procedure Date  8/1/23     Impression  Overall Impression:   Performed forceps biopsies in the antrum  Performed forceps biopsies in the fundus of the stomach  4 cm hiatal hernia  Downhill non-bleeding grade I varices were noted in the proximal esophagus. A 4cm hiatus hernia was noted. Gastric polypoid tissue at a hemostasis clip was biopsied in the fundus and mild erythematous tissue was biopsied in the antrum. The pyloric channel is patent. Mucosa of the duodenum is normal.     Recommendation    Await pathology results      Avoid nsaids; repeat EGD in 1 year; schedule CT chest to r/o partial svc obstruction and return to GI clinic with ENID Calvert.  Discharge:  Disp: DC to home. Resume diet. No driving x 24 hours. F/U with PCP as scheduled and GI clinic as above.  Dx: NET of stomach, proximal esophageal varices, hiatus hernia     Indication  Benign neuroendocrine tumor of stomach

## 2023-08-01 NOTE — H&P
[facility] - Endoscopy  Gastroenterology  H&P    Patient Name: Victoria Delarosa  MRN: [id number]  Admission Date: 8/1/2023  Code Status: Prior    Attending Provider: Preet Rodriguez MD   Primary Care Physician: Tim Bugros MD  Principal Problem:<principal problem not specified>    Subjective:     History of Present Illness: Pt has history of neuroendocrine tumors of the stomach; now for repeat egd.    Past Medical History:   Diagnosis Date    Anxiety state     Benign neuroendocrine tumor of stomach 12/09/2022    right side    BMI 32.0-32.9,adult     Bronchitis     CHF (congestive heart failure)     Cobalamin deficiency     Deep vein thrombosis     Depressive disorder     General anesthetics causing adverse effect in therapeutic use     GERD (gastroesophageal reflux disease)     Heart attack     20 yrs ago    Hypertension     Hypothyroidism     Lower extremity edema     Neuropathy     Osteoarthritis of both knees     Pain in left leg     Pain in right leg     Peripheral edema     Pulmonary embolism 2017    Stroke     Varicose veins of bilateral lower extremities with pain     Vitamin D deficiency        Past Surgical History:   Procedure Laterality Date    BILATERAL TUBAL LIGATION      CARDIAC CATHETERIZATION      COLONOSCOPY      knee scope Left     THYROIDECTOMY, PARTIAL      TONSILLECTOMY, ADENOIDECTOMY      TOTAL KNEE ARTHROPLASTY Right     TOTAL KNEE ARTHROPLASTY Right     TUBAL LIGATION      VAGINAL DELIVERY      x 3       Review of patient's allergies indicates:   Allergen Reactions    Lisinopril      Cough      Statins-hmg-coa reductase inhibitors Hives and Itching     Family History       Problem Relation (Age of Onset)    Arthritis Sister, Brother    Heart disease Brother    Stroke Brother          Tobacco Use    Smoking status: Never    Smokeless tobacco: Never   Substance and Sexual Activity    Alcohol use: Never    Drug use: Never    Sexual activity: Yes     Partners: Male     Birth  "control/protection: Post-menopausal, Surgical     Comment: tubal     Review of Systems   Respiratory: Negative.     Cardiovascular: Negative.    Gastrointestinal: Negative.      Objective:     Vital Signs (Most Recent):  Pulse: 62 (08/01/23 0846)  Resp: 16 (08/01/23 0846)  BP: 118/70 (08/01/23 0846)  SpO2: 97 % (08/01/23 0846) Vital Signs (24h Range):  Pulse:  [62] 62  Resp:  [16] 16  SpO2:  [97 %] 97 %  BP: (118)/(70) 118/70     Weight: 88.5 kg (195 lb) (08/01/23 0846)  Body mass index is 30.54 kg/m².    No intake or output data in the 24 hours ending 08/01/23 0900    Lines/Drains/Airways       Peripheral Intravenous Line  Duration                  Peripheral IV - Single Lumen 08/01/23 0846 22 G Right Antecubital <1 day                    Physical Exam  Vitals reviewed.   Constitutional:       General: She is not in acute distress.     Appearance: Normal appearance. She is well-developed. She is not ill-appearing.   HENT:      Head: Normocephalic and atraumatic.      Nose: Nose normal.   Eyes:      Pupils: Pupils are equal, round, and reactive to light.   Cardiovascular:      Rate and Rhythm: Normal rate and regular rhythm.   Pulmonary:      Effort: Pulmonary effort is normal.      Breath sounds: Normal breath sounds. No wheezing.   Abdominal:      General: Abdomen is flat. Bowel sounds are normal. There is no distension.      Palpations: Abdomen is soft.      Tenderness: There is no abdominal tenderness. There is no guarding.   Skin:     General: Skin is warm and dry.      Coloration: Skin is not jaundiced.   Neurological:      Mental Status: She is alert.   Psychiatric:         Attention and Perception: Attention normal.         Mood and Affect: Affect normal.         Speech: Speech normal.         Behavior: Behavior is cooperative.      Comments: Pt was calm while speaking.         Significant Labs:  CBC: No results for input(s): "WBC", "HGB", "HCT", "PLT" in the last 48 hours.  CMP: No results for input(s): " ""GLU", "CALCIUM", "ALBUMIN", "PROT", "NA", "K", "CO2", "CL", "BUN", "CREATININE", "ALKPHOS", "ALT", "AST", "BILITOT" in the last 48 hours.    Significant Imaging:  Imaging results within the past 24 hours have been reviewed.    Assessment/Plan:     There are no hospital problems to display for this patient.        Imp: gastric neuroendocrine tumors  Plan: egd    Preet Rodriguez MD  Gastroenterology  [facility] - Endoscopy  "

## 2023-08-01 NOTE — ANESTHESIA POSTPROCEDURE EVALUATION
Anesthesia Post Evaluation    Patient: Victoria Delarosa    Procedure(s) Performed: * No procedures listed *    Final Anesthesia Type: general      Patient location during evaluation: GI PACU  Patient participation: Yes- Able to Participate  Level of consciousness: awake and alert  Post-procedure vital signs: reviewed and stable  Pain management: adequate  Airway patency: patent    PONV status at discharge: No PONV  Anesthetic complications: no      Cardiovascular status: blood pressure returned to baseline and hemodynamically stable  Respiratory status: spontaneous ventilation  Hydration status: euvolemic  Follow-up not needed.  Comments: Pt voices appreciation for care          Vitals Value Taken Time   /97 08/01/23 0939   Temp 98.2 08/01/23 1012   Pulse 54 08/01/23 0941   Resp 14 08/01/23 0941   SpO2 96 % 08/01/23 0933   Vitals shown include unvalidated device data.      Event Time   Out of Recovery 09:50:05         Pain/Mira Score: Mira Score: 10 (8/1/2023  9:34 AM)

## 2023-08-01 NOTE — ANESTHESIA PREPROCEDURE EVALUATION
08/01/2023  Victoria Delarosa is a 67 y.o., female.    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    BMI 32.0-32.9,adult     Bronchitis     CHF (congestive heart failure)     Cobalamin deficiency     Deep vein thrombosis     Depressive disorder     General anesthetics causing adverse effect in therapeutic use     GERD (gastroesophageal reflux disease)     Heart attack     20 yrs ago    Hypertension     Hypothyroidism     Lower extremity edema     Neuropathy     Osteoarthritis of both knees     Pain in left leg     Pain in right leg     Peripheral edema     Pulmonary embolism     Stroke     Varicose veins of bilateral lower extremities with pain     Vitamin D deficiency        Past Surgical History:   Procedure Laterality Date    BILATERAL TUBAL LIGATION      CARDIAC CATHETERIZATION      COLONOSCOPY      knee scope Left     THYROIDECTOMY, PARTIAL      TONSILLECTOMY, ADENOIDECTOMY      TOTAL KNEE ARTHROPLASTY Right     TOTAL KNEE ARTHROPLASTY Right     TUBAL LIGATION      VAGINAL DELIVERY      x 3       Family History   Problem Relation Age of Onset    Arthritis Sister     Arthritis Brother     Heart disease Brother     Stroke Brother        Social History     Socioeconomic History    Marital status:     Number of children: 3   Occupational History    Occupation: retired   Tobacco Use    Smoking status: Never    Smokeless tobacco: Never   Substance and Sexual Activity    Alcohol use: Never    Drug use: Never    Sexual activity: Yes     Partners: Male     Birth control/protection: Post-menopausal, Surgical     Comment: tubal       Current Outpatient Medications   Medication Sig Dispense Refill    azithromycin (Z-FABRICIO) 250 MG tablet Take 2 tablets by mouth on day 1; Take 1 tablet by mouth on days 2-5  6 tablet 0    cyanocobalamin 1,000 mcg/mL injection INJECT 1 ML EVERY MONTH (Patient not taking: Reported on 7/17/2023) 1 mL 11    ezetimibe (ZETIA) 10 mg tablet Take 10 mg by mouth once daily.      furosemide (LASIX) 20 MG tablet Take 1 tablet (20 mg total) by mouth once daily. 90 tablet 1    guaiFENesin-codeine 100-10 mg/5 ml (TUSSI-ORGANIDIN NR)  mg/5 mL syrup Take 5 mLs by mouth every 4 (four) hours as needed for Cough. (Patient not taking: Reported on 2/20/2023) 120 mL 0    ketorolac 0.5% (ACULAR) 0.5 % Drop       levothyroxine (SYNTHROID) 25 MCG tablet Take 1 tablet (25 mcg total) by mouth once daily. 90 tablet 1    losartan (COZAAR) 50 MG tablet Take 1 tablet (50 mg total) by mouth once daily. 90 tablet 1    magnesium oxide (MAG-OX) 400 mg (241.3 mg magnesium) tablet Take 1 tablet by mouth once daily.      moxifloxacin (VIGAMOX) 0.5 % ophthalmic solution       oxybutynin (DITROPAN) 5 MG Tab Take 1 tablet by mouth 2 (two) times daily.      oxybutynin (DITROPAN) 5 MG Tab Take 1 tablet (5 mg total) by mouth 2 (two) times daily. 180 tablet 2    pantoprazole (PROTONIX) 40 MG tablet Take 1 tablet (40 mg total) by mouth once daily. (Patient not taking: Reported on 2/20/2023) 30 tablet 11    potassium chloride (KLOR-CON) 10 MEQ TbSR Take 1 tablet (10 mEq total) by mouth once daily. 90 tablet 1    potassium chloride SA (K-DUR,KLOR-CON M) 10 MEQ tablet Take 1 tablet (10 mEq total) by mouth once daily. 90 tablet 1    prednisoLONE acetate (PRED FORTE) 1 % DrpS Place 1 drop into the left eye once daily.      ranolazine (RANEXA) 500 MG Tb12 Take 500 mg by mouth 2 (two) times daily.      sertraline (ZOLOFT) 100 MG tablet Take 1 tablet (100 mg total) by mouth once daily. 90 tablet 1    spironolactone (ALDACTONE) 25 MG tablet Take 1 tablet (25 mg total) by mouth once daily. 90 tablet 1    sulfamethoxazole-trimethoprim 800-160mg (BACTRIM DS) 800-160 mg Tab Take 1 tablet by mouth 2 (two) times daily.  (Patient not taking: Reported on 2/20/2023) 14 tablet 0    warfarin (COUMADIN) 5 MG tablet Take 5 mg by mouth Daily.       No current facility-administered medications for this encounter.       Review of patient's allergies indicates:   Allergen Reactions    Lisinopril      Cough      Statins-hmg-coa reductase inhibitors Hives and Itching       Pre-op Assessment    I have reviewed the Patient Summary Reports.     I have reviewed the Nursing Notes. I have reviewed the NPO Status.   I have reviewed the Medications.     Review of Systems  Anesthesia Hx:  No previous Anesthesia  Denies Family Hx of Anesthesia complications.   Denies Personal Hx of Anesthesia complications.   Social:  Non-Smoker, No Alcohol Use    Cardiovascular:   Hypertension Past MI  CHF    Hepatic/GI:   Bowel Prep. Hiatal Hernia, GERD    Musculoskeletal:   Arthritis     Neurological:   CVA Neuromuscular Disease,    Endocrine:   Hypothyroidism    Psych:   Psychiatric History          Physical Exam  General: Well nourished, Cooperative, Alert and Oriented    Airway:  Mallampati: II   Mouth Opening: Normal  TM Distance: Normal  Tongue: Normal  Neck ROM: Normal ROM    Dental:  Intact        Anesthesia Plan  Type of Anesthesia, risks & benefits discussed:    Anesthesia Type: Gen Natural Airway  Intra-op Monitoring Plan: Standard ASA Monitors  Post Op Pain Control Plan: multimodal analgesia  Induction:  IV  Informed Consent: Informed consent signed with the Patient and all parties understand the risks and agree with anesthesia plan.  All questions answered. Patient consented to blood products? Yes  ASA Score: 3  Day of Surgery Review of History & Physical: I have interviewed and examined the patient. I have reviewed the patient's H&P dated: There are no significant changes.     Ready For Surgery From Anesthesia Perspective.     .

## 2023-08-01 NOTE — TRANSFER OF CARE
"Anesthesia Transfer of Care Note    Patient: Victoria Delarosa    Procedure(s) Performed: * No procedures listed *    Patient location: GI    Anesthesia Type: general    Transport from OR: Transported from OR on room air with adequate spontaneous ventilation    Post pain: adequate analgesia    Post assessment: no apparent anesthetic complications    Post vital signs: stable    Level of consciousness: awake    Nausea/Vomiting: no nausea/vomiting    Complications: none    Transfer of care protocol was followed      Last vitals:   Visit Vitals  /60   Pulse (!) 56   Temp 36.8 °C (98.3 °F)   Resp 20   Ht 5' 7" (1.702 m)   Wt 88.5 kg (195 lb)   SpO2 98%   Breastfeeding No   BMI 30.54 kg/m²     "

## 2023-08-02 ENCOUNTER — OFFICE VISIT (OUTPATIENT)
Dept: FAMILY MEDICINE | Facility: CLINIC | Age: 68
End: 2023-08-02
Payer: MEDICARE

## 2023-08-02 VITALS
WEIGHT: 201.81 LBS | BODY MASS INDEX: 31.67 KG/M2 | HEART RATE: 73 BPM | DIASTOLIC BLOOD PRESSURE: 81 MMHG | HEIGHT: 67 IN | RESPIRATION RATE: 20 BRPM | OXYGEN SATURATION: 96 % | TEMPERATURE: 98 F | SYSTOLIC BLOOD PRESSURE: 127 MMHG

## 2023-08-02 DIAGNOSIS — K29.50 CHRONIC GASTRITIS WITHOUT BLEEDING, UNSPECIFIED GASTRITIS TYPE: ICD-10-CM

## 2023-08-02 DIAGNOSIS — Z78.0 POSTMENOPAUSAL: ICD-10-CM

## 2023-08-02 DIAGNOSIS — Z79.01 LONG TERM CURRENT USE OF ANTICOAGULANT: ICD-10-CM

## 2023-08-02 DIAGNOSIS — Z86.718 PERSONAL HISTORY OF VENOUS THROMBOSIS AND EMBOLISM: ICD-10-CM

## 2023-08-02 DIAGNOSIS — I85.00 IDIOPATHIC ESOPHAGEAL VARICES WITHOUT BLEEDING: ICD-10-CM

## 2023-08-02 DIAGNOSIS — Z00.00 ENCOUNTER FOR SUBSEQUENT ANNUAL WELLNESS VISIT (AWV) IN MEDICARE PATIENT: Primary | ICD-10-CM

## 2023-08-02 DIAGNOSIS — R29.6 FREQUENT FALLS: ICD-10-CM

## 2023-08-02 DIAGNOSIS — I10 HYPERTENSION, UNSPECIFIED TYPE: ICD-10-CM

## 2023-08-02 DIAGNOSIS — K57.30 COLON, DIVERTICULOSIS: ICD-10-CM

## 2023-08-02 DIAGNOSIS — Z00.00 ENCOUNTER FOR PREVENTIVE HEALTH EXAMINATION: ICD-10-CM

## 2023-08-02 DIAGNOSIS — F32.A DEPRESSIVE DISORDER: ICD-10-CM

## 2023-08-02 DIAGNOSIS — D3A.8 BENIGN NEUROENDOCRINE TUMOR OF STOMACH: ICD-10-CM

## 2023-08-02 DIAGNOSIS — Z86.711 HX OF PULMONARY EMBOLUS: ICD-10-CM

## 2023-08-02 DIAGNOSIS — K44.9 HH (HIATUS HERNIA): ICD-10-CM

## 2023-08-02 DIAGNOSIS — E03.9 HYPOTHYROIDISM, UNSPECIFIED TYPE: ICD-10-CM

## 2023-08-02 PROCEDURE — G0439 PR MEDICARE ANNUAL WELLNESS SUBSEQUENT VISIT: ICD-10-PCS | Mod: ,,, | Performed by: NURSE PRACTITIONER

## 2023-08-02 PROCEDURE — G0439 PPPS, SUBSEQ VISIT: HCPCS | Mod: ,,, | Performed by: NURSE PRACTITIONER

## 2023-08-02 NOTE — PATIENT INSTRUCTIONS
Counseling and Referral of Other Preventative  (Italic type indicates deductible and co-insurance are waived)    Patient Name: Victoria Delarosa  Today's Date: 8/2/2023    Health Maintenance       Date Due Completion Date    TETANUS VACCINE Never done ---    Hemoglobin A1c (Diabetic Prevention Screening) Never done ---    Shingles Vaccine (1 of 2) Never done ---    Pneumococcal Vaccines (Age 65+) (3 - PPSV23 or PCV20) 06/04/2020 6/4/2019    DEXA Scan 06/14/2021 6/14/2019    COVID-19 Vaccine (4 - Moderna series) 02/25/2022 12/31/2021    Influenza Vaccine (1) 09/01/2023 12/31/2021 (Done)    Override on 12/31/2021: Done (HAD AT HOMEW PHARM[facility]Y)    Mammogram 03/29/2024 3/29/2023    Colorectal Cancer Screening 11/28/2027 11/28/2022    Lipid Panel 07/17/2028 7/17/2023        No orders of the defined types were placed in this encounter.    The following information is provided to all patients.  This information is to help you find resources for any of the problems found today that may be affecting your health:                Living healthy guide: www.[facility].[place].gov      Understanding Diabetes: www.diabetes.org      Eating healthy: www.cdc.gov/healthyweight      CDC home safety checklist: www.cdc.gov/steadi/patient.html      Agency on Aging: www.goea.[place].[facility]      Alcoholics anonymous (AA): www.aa.org      Physical Activity: www.shannon.nih.gov/qv4mkxa      Tobacco use: www.quitwithusla.org

## 2023-08-02 NOTE — PROGRESS NOTES
EGD biopsies show gastritis and gastric metaplasia. A neuro-endocrine tumor is present at the site of prior biopsy and clip placement. Pt should be scheduled for CT chest and return to clinic due to enlarged veins in the proximal esophagus.   Recommend: Keep CT chest appt; schedule egd in 1 year.

## 2023-08-03 RX ORDER — OXYBUTYNIN CHLORIDE 5 MG/1
5 TABLET ORAL 2 TIMES DAILY
Qty: 180 TABLET | Refills: 2 | Status: SHIPPED | OUTPATIENT
Start: 2023-08-03 | End: 2024-08-02

## 2023-08-04 ENCOUNTER — TELEPHONE (OUTPATIENT)
Dept: FAMILY MEDICINE | Facility: CLINIC | Age: 68
End: 2023-08-04
Payer: MEDICARE

## 2023-08-04 RX ORDER — CYANOCOBALAMIN 1000 UG/ML
INJECTION, SOLUTION INTRAMUSCULAR; SUBCUTANEOUS
Qty: 1 ML | Refills: 4 | Status: CANCELLED | OUTPATIENT
Start: 2023-08-04

## 2023-08-04 NOTE — TELEPHONE ENCOUNTER
----- Message from Isaura Martinez sent at 8/4/2023  8:43 AM CDT -----  B12 injection. [place]  PT# [phone]

## 2023-08-08 PROBLEM — Z79.01 CURRENT LONG-TERM USE OF ANTICOAGULANT MEDICATION WITH HISTORY OF DEEP VENOUS THROMBOSIS (DVT): Status: ACTIVE | Noted: 2023-08-08

## 2023-08-08 PROBLEM — Z78.0 POSTMENOPAUSAL: Status: ACTIVE | Noted: 2023-08-08

## 2023-08-08 PROBLEM — K59.00 CONSTIPATION: Status: RESOLVED | Noted: 2023-05-22 | Resolved: 2023-08-08

## 2023-08-08 PROBLEM — F32.A DEPRESSIVE DISORDER: Status: ACTIVE | Noted: 2023-08-08

## 2023-08-08 PROBLEM — Z86.711 HX OF PULMONARY EMBOLUS: Status: ACTIVE | Noted: 2023-08-08

## 2023-08-08 PROBLEM — R19.7 DIARRHEA: Status: RESOLVED | Noted: 2022-12-07 | Resolved: 2023-08-08

## 2023-08-08 PROBLEM — Z86.718 PERSONAL HISTORY OF VENOUS THROMBOSIS AND EMBOLISM: Status: ACTIVE | Noted: 2023-08-08

## 2023-08-08 PROBLEM — I50.9 CONGESTIVE HEART FAILURE: Status: RESOLVED | Noted: 2022-06-08 | Resolved: 2023-08-08

## 2023-08-08 PROBLEM — K29.50 CHRONIC GASTRITIS WITHOUT BLEEDING: Status: ACTIVE | Noted: 2022-12-07

## 2023-08-08 PROBLEM — S80.01XA CONTUSION OF RIGHT KNEE: Status: RESOLVED | Noted: 2022-08-12 | Resolved: 2023-08-08

## 2023-08-08 PROBLEM — Z79.01 LONG TERM CURRENT USE OF ANTICOAGULANT: Status: ACTIVE | Noted: 2023-08-08

## 2023-08-08 PROBLEM — S40.022A CONTUSION OF LEFT ARM: Status: RESOLVED | Noted: 2022-08-12 | Resolved: 2023-08-08
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
## 2023-08-08 PROBLEM — M62.838 TRAPEZIUS MUSCLE SPASM: Status: RESOLVED | Noted: 2022-05-29 | Resolved: 2023-08-08

## 2023-08-08 PROBLEM — K29.50 CHRONIC GASTRITIS WITHOUT BLEEDING: Status: ACTIVE | Noted: 2023-08-01

## 2023-08-08 PROBLEM — S80.02XA CONTUSION OF LEFT KNEE: Status: RESOLVED | Noted: 2022-08-12 | Resolved: 2023-08-08

## 2023-08-08 PROBLEM — Z88.8 ALLERGY TO STATIN MEDICATION: Status: RESOLVED | Noted: 2022-06-08 | Resolved: 2023-08-08

## 2023-08-08 NOTE — PROGRESS NOTES
.     [facility]     PATIENT NAME: Victoria Delarosa   : 1955    AGE: 67 y.o. DATE: 2023   MRN: [id number]        Reason for Visit / Chief Complaint: Medicare AWV Follow Up (Medicare Subsequent)        Victoria Delarosa presents for a subsequent Medicare AWV today.     The following components were reviewed and updated:    Medical/Social/Family History:  Past Medical History:   Diagnosis Date    Allergy to statin medication 2022    Anxiety state     Benign neuroendocrine tumor of stomach 2022    right side    BMI 32.0-32.9,adult     Bronchitis     CHF (congestive heart failure)     Cobalamin deficiency     Constipation 2023    Contusion of left knee 2022    Contusion of right knee 2022    Deep vein thrombosis     Depressive disorder     Diarrhea 2022    Fall 2022    General anesthetics causing adverse effect in therapeutic use     GERD (gastroesophageal reflux disease)     Heart attack     20 yrs ago    Hypertension     Hypothyroidism     Lower extremity edema     Neuropathy     Osteoarthritis of both knees     Pain in left leg     Pain in right leg     Peripheral edema     Pulmonary embolism 2017    Stroke     Varicose veins of bilateral lower extremities with pain     Vitamin D deficiency         Family History   Problem Relation Age of Onset    Arthritis Sister     Arthritis Brother     Heart disease Brother     Stroke Brother         Social History     Tobacco Use   Smoking Status Never    Passive exposure: Past (brothers and sisters.)   Smokeless Tobacco Never      Social History     Substance and Sexual Activity   Alcohol Use Never       Family History   Problem Relation Age of Onset    Arthritis Sister     Arthritis Brother     Heart disease Brother     Stroke Brother        Past Surgical History:   Procedure Laterality Date    BILATERAL TUBAL LIGATION      CARDIAC CATHETERIZATION      COLONOSCOPY      EYE SURGERY       knee scope Left     THYROIDECTOMY, PARTIAL      TONSILLECTOMY, ADENOIDECTOMY      TOTAL KNEE ARTHROPLASTY Bilateral     TOTAL KNEE ARTHROPLASTY Right     TUBAL LIGATION      VAGINAL DELIVERY      x 3         Allergies and Current Medications     Review of patient's allergies indicates:   Allergen Reactions    Lisinopril      Cough      Statins-hmg-coa reductase inhibitors Hives and Itching       Current Outpatient Medications:     cyanocobalamin 1,000 mcg/mL injection, INJECT 1 ML EVERY MONTH, Disp: 1 mL, Rfl: 11    ezetimibe (ZETIA) 10 mg tablet, Take 10 mg by mouth once daily., Disp: , Rfl:     furosemide (LASIX) 20 MG tablet, Take 1 tablet (20 mg total) by mouth once daily., Disp: 90 tablet, Rfl: 1    levothyroxine (SYNTHROID) 25 MCG tablet, Take 1 tablet (25 mcg total) by mouth once daily., Disp: 90 tablet, Rfl: 1    losartan (COZAAR) 50 MG tablet, Take 1 tablet (50 mg total) by mouth once daily., Disp: 90 tablet, Rfl: 1    magnesium oxide (MAG-OX) 400 mg (241.3 mg magnesium) tablet, Take 1 tablet by mouth once daily., Disp: , Rfl:     oxybutynin (DITROPAN) 5 MG Tab, Take 1 tablet (5 mg total) by mouth 2 (two) times daily., Disp: 90 tablet, Rfl: 1    potassium chloride (KLOR-CON) 10 MEQ TbSR, Take 1 tablet (10 mEq total) by mouth once daily., Disp: 90 tablet, Rfl: 1    ranolazine (RANEXA) 500 MG Tb12, Take 500 mg by mouth 2 (two) times daily., Disp: , Rfl:     sertraline (ZOLOFT) 100 MG tablet, Take 1 tablet (100 mg total) by mouth once daily., Disp: 90 tablet, Rfl: 1    spironolactone (ALDACTONE) 25 MG tablet, Take 1 tablet (25 mg total) by mouth once daily., Disp: 90 tablet, Rfl: 1    warfarin (COUMADIN) 5 MG tablet, Take 5 mg by mouth Daily., Disp: , Rfl:     ketorolac 0.5% (ACULAR) 0.5 % Drop, , Disp: , Rfl:     moxifloxacin (VIGAMOX) 0.5 % ophthalmic solution, , Disp: , Rfl:     oxybutynin (DITROPAN) 5 MG Tab, Take 1 tablet by mouth 2 (two) times daily., Disp: , Rfl:     oxybutynin (DITROPAN) 5 MG Tab, TAKE  1 TABLET (5 MG TOTAL) BY MOUTH 2 (TWO) TIMES DAILY., Disp: 180 tablet, Rfl: 2    prednisoLONE acetate (PRED FORTE) 1 % DrpS, Place 1 drop into the left eye once daily., Disp: , Rfl:     Health Risk Assessment   Fall Risk: yes   Obesity: BMI 31.61     Advance Directive: no Does not have an advanced directive. Verbal education and written education included in today's AVS.   Depression: phq9 = 9    HTN: yes If yes, DASH diet, exercise, weight management, med compliance, home BP monitoring, and follow-up discussed.   T2DM: no If yes, diabetic diet, glucose monitoring, activity level, weight management, med compliance, and follow-up discussed.   Tobacco use: no  STI: no    Alcohol misuse: no   Statin Use: no    Health Maintenance   Last eye exam: 5/2023   Last CV screen with lipids: 9/8/2023   Diabetes screening with fasting glucose or A1c: 9/8/2023   Colonoscopy: 11/28/22 - repeat in 2 years   Flu Vaccine: no   Pneumonia vaccines: pneumovax 11/2/2006; prevnar 13 6/4/2019   COVID vaccine: yes   DEXA: ordered   Last pap/pelvic: n/a   Last Mammogram: 3/29/23     Incontinence  Bowel: no  Bladder: yes    Lab results available in Epic or see dates from [facility] above:   Lab Results   Component Value Date    CHOL 185 07/17/2023    CHOL 186 09/08/2022     Lab Results   Component Value Date    HDL 37 (L) 07/17/2023    HDL 33 (L) 09/08/2022     Lab Results   Component Value Date    LDLCALC 109 07/17/2023    LDLCALC 108 09/08/2022     Lab Results   Component Value Date    TRIG 193 (H) 07/17/2023    TRIG 225 (H) 09/08/2022     Lab Results   Component Value Date    CHOLHDL 5.0 07/17/2023    CHOLHDL 5.6 09/08/2022       Sodium   Date Value Ref Range Status   07/17/2023 142 136 - 145 mmol/L Final     Potassium   Date Value Ref Range Status   07/17/2023 4.0 3.5 - 5.1 mmol/L Final     Chloride   Date Value Ref Range Status   07/17/2023 109 (H) 98 - 107 mmol/L Final     CO2   Date Value Ref Range Status   07/17/2023 31 21 - 32 mmol/L Final  "    Glucose   Date Value Ref Range Status   07/17/2023 106 74 - 106 mg/dL Final     BUN   Date Value Ref Range Status   07/17/2023 13 7 - 18 mg/dL Final     Creatinine   Date Value Ref Range Status   07/17/2023 1.11 (H) 0.55 - 1.02 mg/dL Final     Calcium   Date Value Ref Range Status   07/17/2023 9.1 8.5 - 10.1 mg/dL Final     Total Protein   Date Value Ref Range Status   07/17/2023 6.4 6.4 - 8.2 g/dL Final     Albumin   Date Value Ref Range Status   07/17/2023 3.5 3.5 - 5.0 g/dL Final     Bilirubin, Total   Date Value Ref Range Status   07/17/2023 1.0 >0.0 - 1.2 mg/dL Final     Alk Phos   Date Value Ref Range Status   07/17/2023 87 55 - 142 U/L Final     AST   Date Value Ref Range Status   07/17/2023 26 15 - 37 U/L Final     ALT   Date Value Ref Range Status   07/17/2023 20 13 - 56 U/L Final     Anion Gap   Date Value Ref Range Status   07/17/2023 6 (L) 7 - 16 mmol/L Final     eGFR   Date Value Ref Range Status   10/27/2021 48 (L) >=60 mL/min/1.73m² Final       Care Team   PCP: Dr. Burgos    Eye specialist:    GI: Dr. Rodriguez   Cardiologist: Dr. Miguelito Reed        **See Completed Assessments for Annual Wellness visit within the encounter summary    The following assessments were completed & reviewed:  Depression Screening  Cognitive function Screening  Timed Get Up Test  Whisper Test  Vision Screen  Health Risk Assessment  Checklist of ADLs and IADLs      Objective  /81 (BP Location: Right arm, Patient Position: Sitting, BP Method: Large (Automatic))   Pulse 73   Temp 97.9 °F (36.6 °C) (Oral)   Resp 20   Ht 5' 7" (1.702 m)   Wt 91.5 kg (201 lb 12.8 oz)   SpO2 96%   BMI 31.61 kg/m²        Physical Exam  Constitutional:       Appearance: Normal appearance.   HENT:      Head: Normocephalic.      Right Ear: Tympanic membrane normal.      Left Ear: Tympanic membrane normal.      Mouth/Throat:      Mouth: Mucous membranes are moist.   Cardiovascular:      Rate and Rhythm: Normal rate and regular " rhythm.      Heart sounds: Normal heart sounds. No murmur heard.  Pulmonary:      Effort: Pulmonary effort is normal. No respiratory distress.      Breath sounds: Normal breath sounds.   Skin:     General: Skin is warm and dry.   Neurological:      Mental Status: She is alert.   Psychiatric:         Mood and Affect: Mood normal.         Assessment:     1. Encounter for subsequent annual wellness visit (AWV) in Medicare patient    2. Encounter for preventive health examination    3. BMI 31.0-31.9,adult    4. Hypothyroidism, unspecified type    5. Hypertension, unspecified type    6. Depressive disorder    7. Idiopathic esophageal varices without bleeding    8. Postmenopausal  - DXA Bone Density Axial Skeleton 1 or more sites; Future    9. Benign neuroendocrine tumor of stomach    10. Frequent falls    11. Chronic gastritis without bleeding, unspecified gastritis type    12. HH (hiatus hernia)    13. Colon, diverticulosis    14. Hx of pulmonary embolus    15. Personal history of venous thrombosis and embolism    16. Long term current use of anticoagulant         Plan:    Referrals:   DEXA     Advised to call office if does not hear from anyone with referral appt within 2-3 weeks to check on status of referral. Voiced understanding.      Discussed and provided with a screening schedule and personal prevention plan in accordance with USPSTF age appropriate recommendations and Medicare screening guidelines.   Education, counseling, and referrals were provided as needed.  After Visit Summary printed and given to patient which includes written education and a list of any referrals if indicated.         F/u plan for yearly AWV.    Signature: COCO Mcguire  I offered to discuss advanced care planning, including how to pick a person who would make decisions for you if you were unable to make them for yourself, called a health care power of , and what kind of decisions you might make such as use of life  sustaining treatments such as ventilators and tube feeding when faced with a life limiting illness recorded on a living will that they will need to know. (How you want to be cared for as you near the end of your natural life)     X Patient is interested in learning more about how to make advanced directives.  I provided them paperwork and offered to discuss this with them.

## 2023-08-10 ENCOUNTER — HOSPITAL ENCOUNTER (OUTPATIENT)
Dept: RADIOLOGY | Facility: HOSPITAL | Age: 68
Discharge: HOME | End: 2023-08-10
Attending: INTERNAL MEDICINE
Payer: MEDICARE

## 2023-08-10 ENCOUNTER — OFFICE VISIT (OUTPATIENT)
Dept: GASTROENTEROLOGY | Facility: CLINIC | Age: 68
End: 2023-08-10
Payer: MEDICARE

## 2023-08-10 ENCOUNTER — HOSPITAL ENCOUNTER (OUTPATIENT)
Dept: RADIOLOGY | Facility: HOSPITAL | Age: 68
Discharge: HOME | End: 2023-08-10
Attending: NURSE PRACTITIONER
Payer: MEDICARE

## 2023-08-10 VITALS
DIASTOLIC BLOOD PRESSURE: 68 MMHG | HEIGHT: 67 IN | SYSTOLIC BLOOD PRESSURE: 107 MMHG | BODY MASS INDEX: 31.29 KG/M2 | WEIGHT: 199.38 LBS

## 2023-08-10 DIAGNOSIS — Z78.0 POSTMENOPAUSAL: ICD-10-CM

## 2023-08-10 DIAGNOSIS — I85.00 IDIOPATHIC ESOPHAGEAL VARICES WITHOUT BLEEDING: Primary | ICD-10-CM

## 2023-08-10 DIAGNOSIS — D3A.8 BENIGN NEUROENDOCRINE TUMOR OF STOMACH: ICD-10-CM

## 2023-08-10 DIAGNOSIS — K59.00 CONSTIPATION, UNSPECIFIED CONSTIPATION TYPE: ICD-10-CM

## 2023-08-10 DIAGNOSIS — I85.00 IDIOPATHIC ESOPHAGEAL VARICES WITHOUT BLEEDING: ICD-10-CM

## 2023-08-10 PROCEDURE — 25500020 PHARM REV CODE 255: Performed by: INTERNAL MEDICINE

## 2023-08-10 PROCEDURE — 99214 OFFICE O/P EST MOD 30 MIN: CPT | Mod: PBBFAC,25 | Performed by: NURSE PRACTITIONER

## 2023-08-10 PROCEDURE — 77080 DXA BONE DENSITY AXIAL SKELETON 1 OR MORE SITES: ICD-10-PCS | Mod: 26,,, | Performed by: RADIOLOGY

## 2023-08-10 PROCEDURE — 71260 CT THORAX DX C+: CPT | Mod: 26,,, | Performed by: RADIOLOGY

## 2023-08-10 PROCEDURE — 77080 DXA BONE DENSITY AXIAL: CPT | Mod: 26,,, | Performed by: RADIOLOGY

## 2023-08-10 PROCEDURE — 99214 PR OFFICE/OUTPT VISIT, EST, LEVL IV, 30-39 MIN: ICD-10-PCS | Mod: S$PBB,,, | Performed by: NURSE PRACTITIONER

## 2023-08-10 PROCEDURE — 77080 DXA BONE DENSITY AXIAL: CPT | Mod: TC

## 2023-08-10 PROCEDURE — 71260 CT CHEST WITH CONTRAST: ICD-10-PCS | Mod: 26,,, | Performed by: RADIOLOGY

## 2023-08-10 PROCEDURE — 99214 OFFICE O/P EST MOD 30 MIN: CPT | Mod: S$PBB,,, | Performed by: NURSE PRACTITIONER

## 2023-08-10 PROCEDURE — 71260 CT THORAX DX C+: CPT | Mod: TC

## 2023-08-10 RX ADMIN — IOPAMIDOL 100 ML: 755 INJECTION, SOLUTION INTRAVENOUS at 08:08

## 2023-08-10 NOTE — PROGRESS NOTES
Victoria Delarosa is a 67 y.o. female here for Follow-up        PCP: Tim Burgos  Referring Provider: No referring provider defined for this encounter.     HPI:  Presents for follow-up after CT chest this a.m..  CT chest reviewed with the patient, no acute abnormalities.  Patient had an EGD on 08/01/2023, nonbleeding downhill  varices noted.  4 cm hiatal hernia.  Recommendation was made for the CT chest to rule out SVC partial obstruction.  None noted on CT chest.  Patient does have a well-differentiated neuroendocrine tumor in the fundus.  Moderate to severe gastritis with intestinal metaplasia also noted in the fundus.  Recommendation to repeat EGD in 1 year.  She did have a CT abdomen and pelvis on 02/10 that showed degenerative joint disease of the spine.  Liver is read as normal on CT in February as well as abdominal ultrasound in 11/22.  No other abnormality noted in the abdomen or pelvis.  Patient has intermittent right lower quadrant pain.  Endorses constipation.  States that she sometimes goes 3-4 days without a bowel movement.  She is not taking any laxative.  We did discuss adding MiraLax powder 17 g daily to see if this does improve constipation and reduces abdominal discomfort.  Reports increased gas bloating.  Colonoscopy on 11/28/2022, hyperplastic polyp.    Follow-up  Associated symptoms include abdominal pain and arthralgias. Pertinent negatives include no change in bowel habit, chest pain, fatigue, fever, nausea or vomiting.         ROS:  Review of Systems   Constitutional:  Negative for appetite change, fatigue, fever and unexpected weight change.   HENT:  Negative for trouble swallowing.    Respiratory:  Negative for shortness of breath.    Cardiovascular:  Negative for chest pain.   Gastrointestinal:  Positive for abdominal pain and constipation. Negative for blood in stool, change in bowel habit, diarrhea, nausea, vomiting, reflux and change in bowel habit.   Musculoskeletal:  Positive  for arthralgias. Negative for gait problem.   Integumentary:  Negative for pallor.   Psychiatric/Behavioral:  The patient is not nervous/anxious.           PMHX:  has a past medical history of Allergy to statin medication (6/8/2022), Anxiety state, Benign neuroendocrine tumor of stomach (12/09/2022), BMI 32.0-32.9,adult, Bronchitis, CHF (congestive heart failure), Cobalamin deficiency, Constipation (5/22/2023), Contusion of left knee (8/12/2022), Contusion of right knee (8/12/2022), Deep vein thrombosis, Depressive disorder, Diarrhea (12/7/2022), Fall (8/12/2022), General anesthetics causing adverse effect in therapeutic use, GERD (gastroesophageal reflux disease), Heart attack, Hypertension, Hypothyroidism, Lower extremity edema, Neuropathy, Osteoarthritis of both knees, Pain in left leg, Pain in right leg, Peripheral edema, Pulmonary embolism (2017), Stroke, Varicose veins of bilateral lower extremities with pain, and Vitamin D deficiency.    PSHX:  has a past surgical history that includes Cardiac catheterization; Colonoscopy; knee scope (Left); Total knee arthroplasty (Bilateral); TONSILLECTOMY, ADENOIDECTOMY; Thyroidectomy, partial; Total knee arthroplasty (Right); Bilateral tubal ligation; Vaginal delivery; Tubal ligation; and Eye surgery.    PFHX: family history includes Arthritis in her brother and sister; Heart disease in her brother; Stroke in her brother.    PSlHX:  reports that she has never smoked. She has been exposed to tobacco smoke. She has never used smokeless tobacco. She reports that she does not drink alcohol and does not use drugs.        Review of patient's allergies indicates:   Allergen Reactions    Lisinopril      Cough      Statins-hmg-coa reductase inhibitors Hives and Itching       Medication List with Changes/Refills   Current Medications    CYANOCOBALAMIN 1,000 MCG/ML INJECTION    INJECT 1 ML EVERY MONTH    EZETIMIBE (ZETIA) 10 MG TABLET    Take 10 mg by mouth once daily.    FUROSEMIDE  "(LASIX) 20 MG TABLET    Take 1 tablet (20 mg total) by mouth once daily.    KETOROLAC 0.5% (ACULAR) 0.5 % DROP        LEVOTHYROXINE (SYNTHROID) 25 MCG TABLET    Take 1 tablet (25 mcg total) by mouth once daily.    LOSARTAN (COZAAR) 50 MG TABLET    Take 1 tablet (50 mg total) by mouth once daily.    MAGNESIUM OXIDE (MAG-OX) 400 MG (241.3 MG MAGNESIUM) TABLET    Take 1 tablet by mouth once daily.    MOXIFLOXACIN (VIGAMOX) 0.5 % OPHTHALMIC SOLUTION        OXYBUTYNIN (DITROPAN) 5 MG TAB    Take 1 tablet by mouth 2 (two) times daily.    OXYBUTYNIN (DITROPAN) 5 MG TAB    TAKE 1 TABLET (5 MG TOTAL) BY MOUTH 2 (TWO) TIMES DAILY.    OXYBUTYNIN (DITROPAN) 5 MG TAB    Take 1 tablet (5 mg total) by mouth 2 (two) times daily.    POTASSIUM CHLORIDE (KLOR-CON) 10 MEQ TBSR    Take 1 tablet (10 mEq total) by mouth once daily.    PREDNISOLONE ACETATE (PRED FORTE) 1 % DRPS    Place 1 drop into the left eye once daily.    RANOLAZINE (RANEXA) 500 MG TB12    Take 500 mg by mouth 2 (two) times daily.    SERTRALINE (ZOLOFT) 100 MG TABLET    Take 1 tablet (100 mg total) by mouth once daily.    SPIRONOLACTONE (ALDACTONE) 25 MG TABLET    Take 1 tablet (25 mg total) by mouth once daily.    WARFARIN (COUMADIN) 5 MG TABLET    Take 5 mg by mouth Daily.        Objective Findings:  Vital Signs:  /68   Ht 5' 7" (1.702 m)   Wt 90.4 kg (199 lb 6.4 oz)   BMI 31.23 kg/m²  Body mass index is 31.23 kg/m².    Physical Exam:  Physical Exam  Vitals and nursing note reviewed.   Constitutional:       General: She is not in acute distress.     Appearance: Normal appearance.   HENT:      Mouth/Throat:      Mouth: Mucous membranes are moist.   Cardiovascular:      Rate and Rhythm: Normal rate.   Pulmonary:      Effort: Pulmonary effort is normal.      Breath sounds: No wheezing, rhonchi or rales.   Abdominal:      General: Bowel sounds are normal. There is no distension.      Palpations: Abdomen is soft. There is no mass.      Tenderness: There is no " abdominal tenderness. There is no guarding.   Skin:     General: Skin is warm and dry.      Coloration: Skin is not jaundiced or pale.   Neurological:      Mental Status: She is alert and oriented to person, place, and time.   Psychiatric:         Mood and Affect: Mood normal.          Labs:  Lab Results   Component Value Date    WBC 8.35 07/17/2023    HGB 13.4 07/17/2023    HCT 39.5 07/17/2023    MCV 93.8 07/17/2023    RDW 12.9 07/17/2023     07/17/2023    LYMPH 27.9 07/17/2023    LYMPH 2.33 07/17/2023    MONO 7.8 (H) 07/17/2023    EOS 0.12 07/17/2023    BASO 0.03 07/17/2023     Lab Results   Component Value Date     07/17/2023    K 4.0 07/17/2023     (H) 07/17/2023    CO2 31 07/17/2023     07/17/2023    BUN 13 07/17/2023    CREATININE 1.11 (H) 07/17/2023    CALCIUM 9.1 07/17/2023    PROT 6.4 07/17/2023    ALBUMIN 3.5 07/17/2023    BILITOT 1.0 07/17/2023    ALKPHOS 87 07/17/2023    AST 26 07/17/2023    ALT 20 07/17/2023         Imaging: DXA Bone Density Axial Skeleton 1 or more sites    Result Date: 8/10/2023  EXAMINATION: Bone density study CLINICAL HISTORY: screening;Asymptomatic menopausal state TECHNIQUE: Bone densitometry performed with evaluation of the lumbar spine and left hip. COMPARISON: None FINDINGS: L2-L4: BMD (g/cm2): 0.564 T score: -5 Z score: -2.9 Bone mineral density: Osteoporosis Left total femur: BMD (g/cm2): 0.680 T score: -2.1 Z score: -0.7 Bone mineral density: Osteopenia Left femoral neck: BMD (g/cm2): 0.675 T score: -1.6 Z score: 0.2 Bone mineral density: Osteopenia Ten year fracture risk assessment without prior fracture: Major osteoporotic fracture 15%, hip fracture 2.6%.     Osteoporosis Recommendations: 1. Encourage adequate intake of calcium and vitamin D. 2. Encourage regular weight-bearing and muscle strengthening exercises. 3. Consider hormone replacement therapy if clinically appropriate. 4. Consider antiresorptive therapy. 5. Followup BMD every 1-2 years.  Electronically signed by: Jefferson Seals Date:    08/10/2023 Time:    09:07    CT Chest With Contrast    Result Date: 8/10/2023  EXAMINATION: CT CHEST WITH CONTRAST CLINICAL HISTORY: Proximal esophageal varices;Esophageal varices without bleeding TECHNIQUE: Low dose axial images, sagittal and coronal reformations were obtained from the thoracic inlet to the lung bases following the IV administration of 100 mL of Isovue 370. The CT examination was performed using one or more of the following dose reduction techniques: Automated exposure control, adjustment of the mA and kV according to patient's size, use of acute or iterative reconstruction techniques. COMPARISON: Chest radiograph 12/06/2019 FINDINGS: The heart is normal in size.  A few coronary calcifications are identified.  The exam is not a gated exam but there does appear to be fatty infiltration of the myocardium along the left lateral aspect of the left ventricular apex.  Thoracic aortic calcifications.  Thoracic aorta normal in course and caliber.  The pulmonary arteries enhance normally with no filling defect.  No adenopathy in the chest. The lungs are clear bilaterally.  No pneumothorax.  No pleural effusion. There is a small tracheal diverticulum in the upper chest. The esophagus has a normal contour.  There is a small hiatal hernia. No acute abnormality of the upper abdomen.     No acute abnormality in the chest. The history of esophageal varices noted with no appreciable there is sees seen on this study. Electronically signed by: Jefferson Seals Date:    08/10/2023 Time:    09:05    EGD    Result Date: 8/1/2023  Table formatting from the original result was not included. Procedure Date 8/1/23 Impression Overall      Performed forceps biopsies in the antrum Performed forceps biopsies in the fundus of the stomach 4 cm hiatal hernia [place] non-bleeding grade I varices were noted in the proximal esophagus. A 4cm hiatus hernia was noted. Gastric polypoid tissue at a  hemostasis clip was biopsied in the fundus and mild erythematous tissue was biopsied in the antrum. The pyloric channel is patent. Mucosa of the duodenum is normal. Recommendation  Await pathology results Avoid nsaids; repeat EGD in 1 year; schedule CT chest to r/o partial svc obstruction and return to GI clinic with ENID Calvert. Discharge: Disp: DC to home. Resume diet. No driving x 24 hours. F/U with PCP as scheduled and GI clinic as above. Dx: NET of stomach, proximal esophageal varices, hiatus hernia Indication Benign neuroendocrine tumor of stomach Providers Nevaeh Tony Technician Saúl Long, RN Registered Nurse Emma Anders, FIDEL Rodriguez MD Proceduralist Medications Moderate sedation administered by anesthesia staff - See anesthesia record. Preprocedure A history and physical has been performed, and patient medication allergies have been reviewed. The patient's tolerance of previous anesthesia has been reviewed. The risks and benefits of the procedure and the sedation options and risks were discussed with the patient. All questions were answered and informed consent obtained. ASA Score: ASA 2 - Patient with mild systemic disease with no functional limitations Mallampati Airway Score: II (hard and soft palate, upper portion of tonsils anduvula visible) Details of the Procedure The patient underwent monitored anesthesia care, which was administered by an anesthesia professional. The patient's blood pressure, heart rate, level of consciousness, oxygen, respirations, ECG and ETCO2 were monitored throughout the procedure. The scope was introduced through the mouth and advanced to the third part of the duodenum. Retroflexion was performed in the cardia. Prior to the procedure, the patient's H. Pylori status was unknown. The patient's estimated blood loss was minimal (<5 mL). The procedure was not difficult. The patient tolerated the procedure well. There were no apparent adverse  events. Scope: Gastroscope Scope Serial: [id number] Events Procedure Events Event Event Time Procedure Events Event Event Time SCOPE IN 8/1/2023  9:08 AM SCOPE OUT 8/1/2023  9:14 AM Findings Performed multiple forceps biopsies in the antrum Performed forceps biopsies in the fundus of the stomach 4 cm hiatal hernia         Assessment:  Victoria Delarosa is a 67 y.o. female here with:  1. Idiopathic esophageal varices without bleeding    2. Benign neuroendocrine tumor of stomach    3. Constipation, unspecified constipation type          Recommendations:  1. Repeat EGD in 1 year. Avoid NSAID's  2. Increase fiber to 35 grams daily  Drink 64 ounces of water daily  Exercise 150 minutes per week  Miralax powder 1 capful 17 grams in 8 ounces of liquid daily      Follow up in about 6 weeks (around 9/21/2023).      Order summary:       Thank you for allowing me to participate in the care of Victoria Delarosa.      MALACHI Vu

## 2023-08-15 ENCOUNTER — OFFICE VISIT (OUTPATIENT)
Dept: FAMILY MEDICINE | Facility: CLINIC | Age: 68
End: 2023-08-15
Payer: MEDICARE

## 2023-08-15 VITALS
SYSTOLIC BLOOD PRESSURE: 130 MMHG | HEIGHT: 67 IN | HEART RATE: 76 BPM | OXYGEN SATURATION: 96 % | DIASTOLIC BLOOD PRESSURE: 88 MMHG | TEMPERATURE: 98 F | BODY MASS INDEX: 31.33 KG/M2 | WEIGHT: 199.63 LBS

## 2023-08-15 DIAGNOSIS — R35.0 URINARY FREQUENCY: Primary | ICD-10-CM

## 2023-08-15 DIAGNOSIS — R82.90 ABNORMAL URINE ODOR: ICD-10-CM

## 2023-08-15 DIAGNOSIS — M54.50 LOW BACK PAIN WITHOUT SCIATICA, UNSPECIFIED BACK PAIN LATERALITY, UNSPECIFIED CHRONICITY: ICD-10-CM

## 2023-08-15 LAB
BILIRUB SERPL-MCNC: NEGATIVE MG/DL
BLOOD URINE, POC: ABNORMAL
COLOR, POC UA: YELLOW
GLUCOSE UR QL STRIP: NEGATIVE
KETONES UR QL STRIP: NEGATIVE
LEUKOCYTE ESTERASE URINE, POC: ABNORMAL
NITRITE, POC UA: NEGATIVE
PH, POC UA: 7
PROTEIN, POC: NEGATIVE
SPECIFIC GRAVITY, POC UA: 1.01
UROBILINOGEN, POC UA: 0.2

## 2023-08-15 PROCEDURE — 99213 OFFICE O/P EST LOW 20 MIN: CPT | Mod: ,,, | Performed by: FAMILY MEDICINE

## 2023-08-15 PROCEDURE — 99213 PR OFFICE/OUTPT VISIT, EST, LEVL III, 20-29 MIN: ICD-10-PCS | Mod: ,,, | Performed by: FAMILY MEDICINE

## 2023-08-15 PROCEDURE — 96372 PR INJECTION,THERAP/PROPH/DIAG2ST, IM OR SUBCUT: ICD-10-PCS | Mod: ,,, | Performed by: FAMILY MEDICINE

## 2023-08-15 PROCEDURE — 81003 URINALYSIS AUTO W/O SCOPE: CPT | Mod: RHCUB | Performed by: FAMILY MEDICINE

## 2023-08-15 PROCEDURE — 96372 THER/PROPH/DIAG INJ SC/IM: CPT | Mod: ,,, | Performed by: FAMILY MEDICINE

## 2023-08-15 RX ORDER — CYANOCOBALAMIN 1000 UG/ML
INJECTION, SOLUTION INTRAMUSCULAR; SUBCUTANEOUS
Qty: 1 ML | Refills: 11 | Status: SHIPPED | OUTPATIENT
Start: 2023-08-15

## 2023-08-15 RX ORDER — CEFTRIAXONE 1 G/1
1 INJECTION, POWDER, FOR SOLUTION INTRAMUSCULAR; INTRAVENOUS
Status: COMPLETED | OUTPATIENT
Start: 2023-08-15 | End: 2023-08-15

## 2023-08-15 RX ORDER — SULFAMETHOXAZOLE AND TRIMETHOPRIM 800; 160 MG/1; MG/1
1 TABLET ORAL 2 TIMES DAILY
Qty: 14 TABLET | Refills: 0 | Status: SHIPPED | OUTPATIENT
Start: 2023-08-15

## 2023-08-15 RX ADMIN — CEFTRIAXONE 1 G: 1 INJECTION, POWDER, FOR SOLUTION INTRAMUSCULAR; INTRAVENOUS at 11:08

## 2023-08-15 NOTE — PROGRESS NOTES
Tim Burgos MD   [facility]  [address] [place] [address]     PATIENT NAME: Victoria Delarosa  : 1955  DATE: 8/15/23  MRN: [id number]      Billing Provider: Tim Burgos MD  Level of Service: CO OFFICE/OUTPT VISIT, EST, LEVL III, 20-29 MIN  Patient PCP Information       Provider PCP Type    Tim Burgos MD General            Reason for Visit / Chief Complaint: Urinary Frequency (Burning with urination, low back pain, odor, urinary frequency x 1 week)         History of Present Illness / Problem Focused Workflow     Victoria Delarosa presents to the clinic with Urinary Frequency (Burning with urination, low back pain, odor, urinary frequency x 1 week)     HPI    Review of Systems     Review of Systems   Constitutional:  Negative for activity change, appetite change, fatigue and fever.   HENT:  Positive for nasal congestion, sinus pressure/congestion and sore throat. Negative for ear pain and hearing loss.    Respiratory:  Positive for cough. Negative for chest tightness and shortness of breath.    Cardiovascular:  Negative for chest pain and palpitations.   Gastrointestinal:  Negative for abdominal pain and fecal incontinence.   Genitourinary:  Negative for bladder incontinence and difficulty urinating.   Musculoskeletal:  Negative for arthralgias.   Integumentary:  Negative for rash.   Neurological:  Negative for dizziness and headaches.        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    BMI 32.0-32.9,adult     Bronchitis     CHF (congestive heart failure)     Cobalamin deficiency     Constipation 2023    Contusion of left knee 2022    Contusion of right knee 2022    Deep vein thrombosis     Depressive disorder     Diarrhea 2022    Fall 2022    General anesthetics causing  adverse effect in therapeutic use     GERD (gastroesophageal reflux disease)     Heart attack     20 yrs ago    Hypertension     Hypothyroidism     Lower extremity edema     Neuropathy     Osteoarthritis of both knees     Pain in left leg     Pain in right leg     Peripheral edema     Pulmonary embolism 2017    Stroke     Varicose veins of bilateral lower extremities with pain     Vitamin D deficiency        Past Surgical History:   Procedure Laterality Date    BILATERAL TUBAL LIGATION      CARDIAC CATHETERIZATION      COLONOSCOPY      EYE SURGERY      knee scope Left     THYROIDECTOMY, PARTIAL      TONSILLECTOMY, ADENOIDECTOMY      TOTAL KNEE ARTHROPLASTY Bilateral     TOTAL KNEE ARTHROPLASTY Right     TUBAL LIGATION      VAGINAL DELIVERY      x 3       Social History  Victoria Delarosa  reports that she has never smoked. She has been exposed to tobacco smoke. She has never used smokeless tobacco. She reports that she does not drink alcohol and does not use drugs.    Family History  Victoria Delarosa  family history includes Arthritis in her brother and sister; Heart disease in her brother; Stroke in her brother.    Medications and Allergies     Medications  Outpatient Medications Marked as Taking for the 8/15/23 encounter (Office Visit) with Tim Burgos MD   Medication Sig Dispense Refill    ezetimibe (ZETIA) 10 mg tablet Take 10 mg by mouth once daily.      furosemide (LASIX) 20 MG tablet Take 1 tablet (20 mg total) by mouth once daily. 90 tablet 1    ketorolac 0.5% (ACULAR) 0.5 % Drop       levothyroxine (SYNTHROID) 25 MCG tablet Take 1 tablet (25 mcg total) by mouth once daily. 90 tablet 1    losartan (COZAAR) 50 MG tablet Take 1 tablet (50 mg total) by mouth once daily. 90 tablet 1    magnesium oxide (MAG-OX) 400 mg (241.3 mg magnesium) tablet Take 1 tablet by mouth once daily.      moxifloxacin (VIGAMOX) 0.5 % ophthalmic solution       oxybutynin (DITROPAN) 5 MG Tab Take 1 tablet by mouth 2 (two) times  daily.      oxybutynin (DITROPAN) 5 MG Tab TAKE 1 TABLET (5 MG TOTAL) BY MOUTH 2 (TWO) TIMES DAILY. 180 tablet 2    oxybutynin (DITROPAN) 5 MG Tab Take 1 tablet (5 mg total) by mouth 2 (two) times daily. 90 tablet 1    potassium chloride (KLOR-CON) 10 MEQ TbSR Take 1 tablet (10 mEq total) by mouth once daily. 90 tablet 1    prednisoLONE acetate (PRED FORTE) 1 % DrpS Place 1 drop into the left eye once daily.      ranolazine (RANEXA) 500 MG Tb12 Take 500 mg by mouth 2 (two) times daily.      sertraline (ZOLOFT) 100 MG tablet Take 1 tablet (100 mg total) by mouth once daily. 90 tablet 1    spironolactone (ALDACTONE) 25 MG tablet Take 1 tablet (25 mg total) by mouth once daily. 90 tablet 1    warfarin (COUMADIN) 5 MG tablet Take 5 mg by mouth Daily.      [DISCONTINUED] cyanocobalamin 1,000 mcg/mL injection INJECT 1 ML EVERY MONTH 1 mL 11       Allergies  Review of patient's allergies indicates:   Allergen Reactions    Lisinopril      Cough      Statins-hmg-coa reductase inhibitors Hives and Itching       Physical Examination     Vitals:    08/15/23 1026   BP: 130/88   Pulse: 76   Temp: 98 °F (36.7 °C)     Physical Exam  Constitutional:       General: She is not in acute distress.     Appearance: She is not ill-appearing.   HENT:      Head: Normocephalic and atraumatic.      Right Ear: Tympanic membrane and ear canal normal.      Left Ear: Tympanic membrane and ear canal normal.      Nose: Nose normal. No congestion or rhinorrhea.   Eyes:      Pupils: Pupils are equal, round, and reactive to light.   Cardiovascular:      Rate and Rhythm: Normal rate and regular rhythm.      Pulses: Normal pulses.      Heart sounds: No murmur heard.  Pulmonary:      Effort: No respiratory distress.      Breath sounds: No wheezing, rhonchi or rales.   Abdominal:      General: Bowel sounds are normal.      Palpations: Abdomen is soft.      Tenderness: There is no abdominal tenderness.      Hernia: No hernia is present.   Musculoskeletal:       Cervical back: Normal range of motion and neck supple.      Lumbar back: Spasms, tenderness and bony tenderness present. Decreased range of motion. Negative right straight leg raise test.   Lymphadenopathy:      Cervical: No cervical adenopathy.   Skin:     General: Skin is warm and dry.   Neurological:      Mental Status: She is alert.   Psychiatric:         Behavior: Behavior normal.         Thought Content: Thought content normal.          Assessment and Plan (including Health Maintenance)   :    Plan:         Health Maintenance Due   Topic Date Due    TETANUS VACCINE  Never done    Hemoglobin A1c (Diabetic Prevention Screening)  Never done    Shingles Vaccine (1 of 2) Never done    Pneumococcal Vaccines (Age 65+) (3 - PPSV23 or PCV20) 06/04/2020    COVID-19 Vaccine (4 - Moderna series) 02/25/2022       Problem List Items Addressed This Visit    None  Visit Diagnoses       Urinary frequency    -  Primary    Relevant Orders    POCT URINALYSIS W/O SCOPE (Completed)    Abnormal urine odor        Relevant Orders    POCT URINALYSIS W/O SCOPE (Completed)    Low back pain without sciatica, unspecified back pain laterality, unspecified chronicity        Relevant Orders    POCT URINALYSIS W/O SCOPE (Completed)          Urinary frequency  -     POCT URINALYSIS W/O SCOPE    Abnormal urine odor  -     POCT URINALYSIS W/O SCOPE    Low back pain without sciatica, unspecified back pain laterality, unspecified chronicity  -     POCT URINALYSIS W/O SCOPE    Other orders  -     cyanocobalamin 1,000 mcg/mL injection; INJECT 1 ML EVERY MONTH  Dispense: 1 mL; Refill: 11  -     sulfamethoxazole-trimethoprim 800-160mg (BACTRIM DS) 800-160 mg Tab; Take 1 tablet by mouth 2 (two) times daily.  Dispense: 14 tablet; Refill: 0  -     cefTRIAXone injection 1 g       Health Maintenance Topics with due status: Not Due       Topic Last Completion Date    Influenza Vaccine 12/31/2021    Colorectal Cancer Screening 11/28/2022    Mammogram  03/29/2023    Lipid Panel 07/17/2023    DEXA Scan 08/10/2023       Procedures     Future Appointments   Date Time Provider Department Center   9/21/2023  9:45 AM Lizeth Rao FNP [facility]   6/3/2024 11:20 AM [facility]        No follow-ups on file.       Signature:  Tim Burgos MD  [facility]  [address] [place] [address]  [phone] Phone  [phone] Fax    Date of encounter: 8/15/23

## 2023-08-17 NOTE — PROGRESS NOTES
Tried to contact patient regarding dexa but no answer and unable to leave a message on her voice mail. Will try again later.

## 2023-09-07 DIAGNOSIS — M81.0 OSTEOPOROSIS WITHOUT CURRENT PATHOLOGICAL FRACTURE, UNSPECIFIED OSTEOPOROSIS TYPE: Primary | ICD-10-CM

## 2023-09-07 RX ORDER — MULTIVITAMIN
1 TABLET ORAL 2 TIMES DAILY
Qty: 180 TABLET | Refills: 3 | Status: SHIPPED | OUTPATIENT
Start: 2023-09-07

## 2023-09-07 RX ORDER — ALENDRONATE SODIUM 70 MG/1
70 TABLET ORAL
Qty: 4 TABLET | Refills: 11 | Status: SHIPPED | OUTPATIENT
Start: 2023-09-07 | End: 2024-09-06

## 2023-09-13 ENCOUNTER — OFFICE VISIT (OUTPATIENT)
Dept: FAMILY MEDICINE | Facility: CLINIC | Age: 68
End: 2023-09-13
Payer: MEDICARE

## 2023-09-13 VITALS
SYSTOLIC BLOOD PRESSURE: 122 MMHG | DIASTOLIC BLOOD PRESSURE: 76 MMHG | WEIGHT: 203.38 LBS | HEART RATE: 67 BPM | BODY MASS INDEX: 31.92 KG/M2 | HEIGHT: 67 IN | OXYGEN SATURATION: 96 % | TEMPERATURE: 98 F

## 2023-09-13 DIAGNOSIS — N30.01 ACUTE CYSTITIS WITH HEMATURIA: ICD-10-CM

## 2023-09-13 DIAGNOSIS — R82.90 ABNORMAL URINE ODOR: Primary | ICD-10-CM

## 2023-09-13 LAB
BILIRUB SERPL-MCNC: NEGATIVE MG/DL
BLOOD URINE, POC: ABNORMAL
COLOR, POC UA: YELLOW
GLUCOSE UR QL STRIP: ABNORMAL
KETONES UR QL STRIP: NEGATIVE
LEUKOCYTE ESTERASE URINE, POC: ABNORMAL
NITRITE, POC UA: POSITIVE
PH, POC UA: 6
PROTEIN, POC: NEGATIVE
SPECIFIC GRAVITY, POC UA: 1.02
UROBILINOGEN, POC UA: 0.2

## 2023-09-13 PROCEDURE — 99213 OFFICE O/P EST LOW 20 MIN: CPT | Mod: ,,, | Performed by: FAMILY MEDICINE

## 2023-09-13 PROCEDURE — 96372 THER/PROPH/DIAG INJ SC/IM: CPT | Mod: ,,, | Performed by: FAMILY MEDICINE

## 2023-09-13 PROCEDURE — 96372 PR INJECTION,THERAP/PROPH/DIAG2ST, IM OR SUBCUT: ICD-10-PCS | Mod: ,,, | Performed by: FAMILY MEDICINE

## 2023-09-13 PROCEDURE — 99213 PR OFFICE/OUTPT VISIT, EST, LEVL III, 20-29 MIN: ICD-10-PCS | Mod: ,,, | Performed by: FAMILY MEDICINE

## 2023-09-13 PROCEDURE — 81003 URINALYSIS AUTO W/O SCOPE: CPT | Mod: RHCUB | Performed by: FAMILY MEDICINE

## 2023-09-13 RX ORDER — CEFUROXIME AXETIL 500 MG/1
500 TABLET ORAL 2 TIMES DAILY
Qty: 14 TABLET | Refills: 0 | Status: SHIPPED | OUTPATIENT
Start: 2023-09-13 | End: 2024-01-25 | Stop reason: SDUPTHER

## 2023-09-13 RX ORDER — CEFTRIAXONE 1 G/1
1 INJECTION, POWDER, FOR SOLUTION INTRAMUSCULAR; INTRAVENOUS
Status: COMPLETED | OUTPATIENT
Start: 2023-09-13 | End: 2023-09-13

## 2023-09-13 RX ADMIN — CEFTRIAXONE 1 G: 1 INJECTION, POWDER, FOR SOLUTION INTRAMUSCULAR; INTRAVENOUS at 01:09

## 2023-09-13 NOTE — PROGRESS NOTES
Tim Burgos MD   [facility]  [address] [place] [address]     PATIENT NAME: Victoria Delarosa  : 1955  DATE: 23  MRN: [id number]      Billing Provider: Tim Burgos MD  Level of Service: SC OFFICE/OUTPT VISIT, EST, LEVL III, 20-29 MIN  Patient PCP Information       Provider PCP Type    Tim Burgos MD General            Reason for Visit / Chief Complaint: Urinary Tract Infection (C/o dysuria, cloudy urine and odor to urine since )         History of Present Illness / Problem Focused Workflow     Victoria Delarosa presents to the clinic with Urinary Tract Infection (C/o dysuria, cloudy urine and odor to urine since )     HPI    Review of Systems     Review of Systems   Constitutional:  Negative for activity change, appetite change, fatigue and fever.   HENT:  Negative for nasal congestion, ear pain, hearing loss, sinus pressure/congestion and sore throat.    Respiratory:  Negative for cough, chest tightness and shortness of breath.    Cardiovascular:  Negative for chest pain and palpitations.   Gastrointestinal:  Negative for abdominal pain and fecal incontinence.   Genitourinary:  Positive for dysuria, flank pain and hematuria. Negative for bladder incontinence and difficulty urinating.   Musculoskeletal:  Negative for arthralgias.   Integumentary:  Negative for rash.   Neurological:  Negative for dizziness and headaches.        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    BMI 32.0-32.9,adult     Bronchitis     CHF (congestive heart failure)     Cobalamin deficiency     Constipation 2023    Contusion of left knee 2022    Contusion of right knee 2022    Deep vein thrombosis     Depressive disorder     Diarrhea 2022    Fall 2022    General anesthetics causing  adverse effect in therapeutic use     GERD (gastroesophageal reflux disease)     Heart attack     20 yrs ago    Hypertension     Hypothyroidism     Lower extremity edema     Neuropathy     Osteoarthritis of both knees     Pain in left leg     Pain in right leg     Peripheral edema     Pulmonary embolism 2017    Stroke     Varicose veins of bilateral lower extremities with pain     Vitamin D deficiency        Past Surgical History:   Procedure Laterality Date    BILATERAL TUBAL LIGATION      CARDIAC CATHETERIZATION      COLONOSCOPY      EYE SURGERY      knee scope Left     THYROIDECTOMY, PARTIAL      TONSILLECTOMY, ADENOIDECTOMY      TOTAL KNEE ARTHROPLASTY Bilateral     TOTAL KNEE ARTHROPLASTY Right     TUBAL LIGATION      VAGINAL DELIVERY      x 3       Social History  Victoria Delarosa  reports that she has never smoked. She has been exposed to tobacco smoke. She has never used smokeless tobacco. She reports that she does not drink alcohol and does not use drugs.    Family History  Victoria Delarosa  family history includes Arthritis in her brother and sister; Heart disease in her brother; Stroke in her brother.    Medications and Allergies     Medications  Outpatient Medications Marked as Taking for the 9/13/23 encounter (Office Visit) with Tim Burgos MD   Medication Sig Dispense Refill    alendronate (FOSAMAX) 70 MG tablet Take 1 tablet (70 mg total) by mouth every 7 days. 4 tablet 11    calcium-vitamin D 600 mg-10 mcg (400 unit) Tab Take 1 tablet by mouth 2 (two) times a day. 180 tablet 3    cyanocobalamin 1,000 mcg/mL injection INJECT 1 ML EVERY MONTH 1 mL 11    ezetimibe (ZETIA) 10 mg tablet Take 10 mg by mouth once daily.      furosemide (LASIX) 20 MG tablet Take 1 tablet (20 mg total) by mouth once daily. 90 tablet 1    ketorolac 0.5% (ACULAR) 0.5 % Drop       levothyroxine (SYNTHROID) 25 MCG tablet Take 1 tablet (25 mcg total) by mouth once daily. 90 tablet 1    losartan (COZAAR) 50 MG tablet Take 1  tablet (50 mg total) by mouth once daily. 90 tablet 1    magnesium oxide (MAG-OX) 400 mg (241.3 mg magnesium) tablet Take 1 tablet by mouth once daily.      moxifloxacin (VIGAMOX) 0.5 % ophthalmic solution       oxybutynin (DITROPAN) 5 MG Tab Take 1 tablet by mouth 2 (two) times daily.      oxybutynin (DITROPAN) 5 MG Tab TAKE 1 TABLET (5 MG TOTAL) BY MOUTH 2 (TWO) TIMES DAILY. 180 tablet 2    oxybutynin (DITROPAN) 5 MG Tab Take 1 tablet (5 mg total) by mouth 2 (two) times daily. 90 tablet 1    potassium chloride (KLOR-CON) 10 MEQ TbSR Take 1 tablet (10 mEq total) by mouth once daily. 90 tablet 1    prednisoLONE acetate (PRED FORTE) 1 % DrpS Place 1 drop into the left eye once daily.      ranolazine (RANEXA) 500 MG Tb12 Take 500 mg by mouth 2 (two) times daily.      sertraline (ZOLOFT) 100 MG tablet Take 1 tablet (100 mg total) by mouth once daily. 90 tablet 1    spironolactone (ALDACTONE) 25 MG tablet Take 1 tablet (25 mg total) by mouth once daily. 90 tablet 1    warfarin (COUMADIN) 5 MG tablet Take 5 mg by mouth Daily.       Current Facility-Administered Medications for the 9/13/23 encounter (Office Visit) with Tim Burgos MD   Medication Dose Route Frequency Provider Last Rate Last Admin    cefTRIAXone injection 1 g  1 g Intramuscular 1 time in Clinic/HOD Tim Burgos MD           Allergies  Review of patient's allergies indicates:   Allergen Reactions    Lisinopril      Cough      Statins-hmg-coa reductase inhibitors Hives and Itching       Physical Examination     Vitals:    09/13/23 1310   BP: 122/76   Pulse: 67   Temp: 98 °F (36.7 °C)     Physical Exam  Constitutional:       General: She is not in acute distress.     Appearance: She is not ill-appearing.   HENT:      Head: Normocephalic and atraumatic.      Right Ear: Tympanic membrane and ear canal normal.      Left Ear: Tympanic membrane and ear canal normal.      Nose: Nose normal. No congestion or rhinorrhea.   Eyes:      Pupils: Pupils  are equal, round, and reactive to light.   Cardiovascular:      Rate and Rhythm: Normal rate and regular rhythm.      Pulses: Normal pulses.      Heart sounds: No murmur heard.  Pulmonary:      Effort: No respiratory distress.      Breath sounds: No wheezing, rhonchi or rales.   Abdominal:      General: Bowel sounds are normal.      Palpations: Abdomen is soft.      Tenderness: There is no abdominal tenderness.      Hernia: No hernia is present.   Musculoskeletal:      Cervical back: Normal range of motion and neck supple.   Lymphadenopathy:      Cervical: No cervical adenopathy.   Skin:     General: Skin is warm and dry.   Neurological:      Mental Status: She is alert.   Psychiatric:         Behavior: Behavior normal.         Thought Content: Thought content normal.          Assessment and Plan (including Health Maintenance)   :    Plan:         Health Maintenance Due   Topic Date Due    TETANUS VACCINE  Never done    Hemoglobin A1c (Diabetic Prevention Screening)  Never done    Shingles Vaccine (1 of 2) Never done    COVID-19 Vaccine (4 - Moderna series) 02/25/2022    Influenza Vaccine (1) 09/01/2023       Problem List Items Addressed This Visit    None  Visit Diagnoses       Abnormal urine odor    -  Primary    Relevant Orders    POCT URINALYSIS W/O SCOPE (Completed)    Acute cystitis with hematuria              Abnormal urine odor  -     POCT URINALYSIS W/O SCOPE    Acute cystitis with hematuria    Other orders  -     cefTRIAXone injection 1 g  -     cefUROXime (CEFTIN) 500 MG tablet; Take 1 tablet (500 mg total) by mouth 2 (two) times daily.  Dispense: 14 tablet; Refill: 0       Health Maintenance Topics with due status: Not Due       Topic Last Completion Date    Pneumococcal Vaccines (Age 65+) 06/04/2019    Colorectal Cancer Screening 11/28/2022    Mammogram 03/29/2023    Lipid Panel 07/17/2023    DEXA Scan 08/10/2023       Procedures     Future Appointments   Date Time Provider Department Center   9/21/2023   9:45 AM Lizeth Rao, J[facility]   6/3/2024 11:20 AM [facility] M[facility]MO[facility]MO [facility]        No follow-ups on file.       Signature:  Tim Burgos MD  [facility]  [address] [place] [address]  [phone] Phone  [phone] Fax    Date of encounter: 9/13/23

## 2023-11-16 LAB — HM EYE EXAM: NORMAL

## 2023-12-06 DIAGNOSIS — Z98.890 HISTORY OF ARTHROSCOPY OF BOTH KNEES: Primary | ICD-10-CM

## 2023-12-08 NOTE — PROGRESS NOTES
No treatment completed due to PT contacting referring physicians to clarify patient PT orders and treatment plan.   Calvin Hartley, PT, DPT

## 2023-12-09 DIAGNOSIS — Z71.89 COMPLEX CARE COORDINATION: ICD-10-CM

## 2023-12-11 ENCOUNTER — CLINICAL SUPPORT (OUTPATIENT)
Dept: REHABILITATION | Facility: HOSPITAL | Age: 68
End: 2023-12-11
Payer: MEDICARE

## 2023-12-11 DIAGNOSIS — Z98.890 HISTORY OF ARTHROSCOPY OF BOTH KNEES: ICD-10-CM

## 2023-12-11 DIAGNOSIS — M62.81 MUSCLE WEAKNESS OF LOWER EXTREMITY: Primary | ICD-10-CM

## 2023-12-11 DIAGNOSIS — M54.42 CHRONIC BILATERAL LOW BACK PAIN WITH BILATERAL SCIATICA: ICD-10-CM

## 2023-12-11 DIAGNOSIS — M54.41 CHRONIC BILATERAL LOW BACK PAIN WITH BILATERAL SCIATICA: ICD-10-CM

## 2023-12-11 DIAGNOSIS — G89.29 CHRONIC BILATERAL LOW BACK PAIN WITH BILATERAL SCIATICA: ICD-10-CM

## 2023-12-11 PROCEDURE — 97162 PT EVAL MOD COMPLEX 30 MIN: CPT

## 2023-12-11 NOTE — PLAN OF CARE
[facility]   Physical Therapy Initial Evaluation      Name: Victoria Delarosa  [facility] Number: [id number]    Therapy Diagnosis: back pain, B LE muscle weakness, and decreased balance      Physician: Jd Mendoza PA and Jefferson Angeles MD     Physician Orders: PT Eval and Treat   Medical Diagnosis from Referral: history of B knee arthroscopy   Evaluation Date: 12/11/2023  Authorization Period Expiration: 12/5/2024   Plan of Care Expiration: 01/05/2023  Progress Note Due: 01/05/2023  Date of Surgery: NA  Visit # / Visits authorized: 1/ 8   FOTO: to be completed on visit 4     Precautions: Standard and Fall     Time In: 8:47  Time Out: 9:14  Total Billable Time: 27 minutes    Subjective   Date of onset: chronic history     History of current condition - Ana Paula reports: to PT with reports of chronic bilateral LE muscle weakness that she feels has progressively worsened. She reports that over the past four years she has become more limited in her mobility and activity tolerance due to low back pain, lower extremity weakness and pain, and decreased balance. She also reports frequent falls that she is unsure of the cause. Patient states that she is limited to about ten minutes of standing or walking before she has to sit down to pain burning and tingling pain in both lower extremities, pain in her low back, and a feeling like her legs are going to give out. She reports difficulty walking, standing, getting up and down from a chair, and negotiating stairs.     Falls: Frequent falls over the past few years     Imaging: none    Prior Therapy: Patient has completed PT at this facility in the past for balance, lower extremity weakness and low back pain with minimal improvements.   Social History:  lives with their spouse  Occupation: Not employed   Prior Level of Function: Independent   Current Level of Function: Independent with activity limitations     Pain:  Current 4/10, worst 10/10, best 4/10  "  Location: bilateral low back and lower extremities   Description: Aching, Burning, Tingling, Numb, and Sharp  Aggravating Factors: Standing, Bending, Walking, and Getting out of bed/chair  Easing Factors: rest    Patients goals: "stop falling"     Medical History:   Past Medical History:   Diagnosis Date    Allergy to statin medication 6/8/2022    Anxiety state     Benign neuroendocrine tumor of stomach 12/09/2022    right side    BMI 32.0-32.9,adult     Bronchitis     CHF (congestive heart failure)     Cobalamin deficiency     Constipation 5/22/2023    Contusion of left knee 8/12/2022    Contusion of right knee 8/12/2022    Deep vein thrombosis     Depressive disorder     Diarrhea 12/7/2022    Fall 8/12/2022    General anesthetics causing adverse effect in therapeutic use     GERD (gastroesophageal reflux disease)     Heart attack     20 yrs ago    Hypertension     Hypothyroidism     Lower extremity edema     Neuropathy     Osteoarthritis of both knees     Pain in left leg     Pain in right leg     Peripheral edema     Pulmonary embolism 2017    Stroke     Varicose veins of bilateral lower extremities with pain     Vitamin D deficiency        Surgical History:   Victoria Delarosa  has a past surgical history that includes Cardiac catheterization; Colonoscopy; knee scope (Left); Total knee arthroplasty (Bilateral); TONSILLECTOMY, ADENOIDECTOMY; Thyroidectomy, partial; Total knee arthroplasty (Right); Bilateral tubal ligation; Vaginal delivery; Tubal ligation; and Eye surgery.    Medications:   Victoria has a current medication list which includes the following prescription(s): alendronate, calcium-vitamin d, cefuroxime, cyanocobalamin, ezetimibe, furosemide, ketorolac 0.5%, levothyroxine, losartan, magnesium oxide, moxifloxacin, oxybutynin, oxybutynin, oxybutynin, potassium chloride, prednisolone acetate, ranolazine, sertraline, spironolactone, sulfamethoxazole-trimethoprim 800-160mg, warfarin, and [DISCONTINUED] " acyclovir.    Allergies:   Review of patient's allergies indicates:   Allergen Reactions    Lisinopril      Cough      Statins-hmg-coa reductase inhibitors Hives and Itching        Objective    Posture:  Standing lordosis: Increased  Sitting lordosis: Increased  Lateral shift: left  Comments: PT unable to adequately assess patient's pelvic alignment due to increased soft tissue and patient with increased sensitivity to palpation.     Patient's lumbar active range of motion is decreased in all directions by at least 50% due to pain and tissue tightness.     MANUAL MUSCLE TEST  Right left   Hip flexion MMT number: 3+/5 MMT strength: 4/5   Hip abduction MMT strength: 4/5 MMT strength: 4/5   Knee extension MMT strength: 3+/5 MMT strength: 4/5   Knee flexion  MMT strength: 3+/5 MMT strength: 4/5   Ankle dorsiflexion MMT strength: 4/5 MMT strength: 4/5   Ankle plantar flexion  MMT strength: 4/5 MMT strength: 4/5   Extensor hallucis longus MMT strength: 4/5 MMT strength: 4/5     ROM/flexibility right left   Hamstring 90/90 (-10) -18 degrees  -12 degrees      Special test Right  Left    SLR test < 60 degrees Negative Negative   SLR test > 60 degrees Positive Negative   Sitting slump test Positive Positive   Piriformis test Positive Positive   FRANCISCO test Positive Positive   SI forward bend Negative Negative   SI distraction Negative  Negative    SI compression Positive  Negative     Gait assessment:   Step length: decreased bilaterally   Nancy: decreased right  Antalgic gait: Yes  Assistive device: none    PT unable to complete proper assessment of lumbar mobility and musculature due to patient with increased sensitivity to palpation of lumbar and hip regions.     Intake Outcome Measure for FOTO  Survey    Therapist reviewed FOTO scores for Victoria Delarosa on 12/11/2023.   FOTO report - see Media section or FOTO account episode details.    Intake Score: 48%       Treatment   No treatment completed today due to PT contact  referring physicians to discuss patient's PT plan of care and treatment plan.   Patient Education and Home Exercises     Education provided:   - eval findings, plan of care, and Home exercise program     Written Home Exercises Provided: No home exercise program provided today due to no treatment completed.     Assessment   Victoria is a 67 y.o. female referred to outpatient Physical Therapy with a medical diagnosis of history of B LE knee arthroplasty as well as another PT referral from another physician for low back pain. Patient presents with B lower extremity muscle weakness, core and lumbar muscle weakness, low back and B lower extremity pain, and tissue tightness. Patient's impairments are limiting her overall mobility and activity tolerance at this time. Patient presented to clinic today with two therapy referrals from different doctors. She was referred to PT for LE weakness and decreased balance by Dr. Mendoza and for low back pain by Dr. Angeles. Upon evaluation, PT feels that patient's LE weakness and lack of motor control is likely related to lumbar nerve involvement. PT discussed findings with Dr. Angeles's office and determined that PT would proceed with treating patient's symptoms of lumbar radiculopathy and related LE weakness at this time. This PT evaluation will be sent to both referring physicians at this time.     Patient prognosis is Fair.   Patient will benefit from skilled outpatient Physical Therapy to address the deficits stated above and in the chart below, provide patient /family education, and to maximize patientt's level of independence.     Plan of care discussed with patient: Yes  Patient's spiritual, cultural and educational needs considered and patient is agreeable to the plan of care and goals as stated below:     Anticipated Barriers for therapy: chronicity of pain, guarding, compliance with home exercise program, sedentary lifestyle, lack of improvements with previous PT plan of care      Medical Necessity is demonstrated by the following  History  Co-morbidities and personal factors that may impact the plan of care [] LOW: no personal factors / co-morbidities  [x] MODERATE: 1-2 personal factors / co-morbidities  [] HIGH: 3+ personal factors / co-morbidities    Moderate / High Support Documentation:   Co-morbidities affecting plan of care: anxiety, low back pain, B knee replacements     Personal Factors:   lifestyle     Examination  Body Structures and Functions, activity limitations and participation restrictions that may impact the plan of care [x] LOW: addressing 1-2 elements  [] MODERATE: 3+ elements  [] HIGH: 4+ elements (please support below)    Moderate / High Support Documentation: NA     Clinical Presentation [x] LOW: stable  [] MODERATE: Evolving  [] HIGH: Unstable     Decision Making/ Complexity Score: low       Goals:  Short Term Goals: 2 weeks   Patient will independently complete Home exercise program with correct form.   Patient will report decreased low back pain to less than or equal to 4/10 with standing and walking for improved quality of life.     Long Term Goals: 4 weeks   .Patient will report decrease in pain to 2/10 to improve quality of life  Patient will report decrease in radiating occurrences from 90% of the time to less than or equal to 60% of the time to allow patient the ability to perform activities of daily living   Patient will increase B lower extremity strength to 4+/5 to improve ambulation ability  Patient will increase B hamstring 90/90 by 10 degrees   Patient will have increased FOTO score to greater than or equal to 60% indicating increased function.   Plan   Plan of care Certification: 12/11/2023 to 1/05/2024.    Outpatient Physical Therapy 2 times weekly for 4 weeks to include the following interventions: Electrical Stimulation unattended, Manual Therapy, Moist Heat/ Ice, Patient Education, Therapeutic Activities, and Therapeutic Exercise.     Calvin EVANS  Naeem, PT, DPT     Physician's Signature: _________________________________________ Date: ________________

## 2023-12-14 ENCOUNTER — CLINICAL SUPPORT (OUTPATIENT)
Dept: REHABILITATION | Facility: HOSPITAL | Age: 68
End: 2023-12-14
Payer: MEDICARE

## 2023-12-14 DIAGNOSIS — M62.81 MUSCLE WEAKNESS OF LOWER EXTREMITY: Primary | ICD-10-CM

## 2023-12-14 DIAGNOSIS — G89.29 CHRONIC BILATERAL LOW BACK PAIN WITH BILATERAL SCIATICA: ICD-10-CM

## 2023-12-14 DIAGNOSIS — M54.42 CHRONIC BILATERAL LOW BACK PAIN WITH BILATERAL SCIATICA: ICD-10-CM

## 2023-12-14 DIAGNOSIS — M54.41 CHRONIC BILATERAL LOW BACK PAIN WITH BILATERAL SCIATICA: ICD-10-CM

## 2023-12-14 PROCEDURE — 97014 ELECTRIC STIMULATION THERAPY: CPT | Mod: CQ

## 2023-12-14 PROCEDURE — 97110 THERAPEUTIC EXERCISES: CPT | Mod: CQ

## 2023-12-14 NOTE — PROGRESS NOTES
"[facility]   Physical Therapy Treatment Note      Name: Victoria Delarosa  Clinic Number: [id number]        Visit Date: 12/14/2023  Therapy Diagnosis: back pain, B LE muscle weakness, and decreased balance      Physician: Jd Mendoza PA and Jefferson Angeles MD      Physician Orders: PT Eval and Treat   Medical Diagnosis from Referral: history of B knee arthroscopy   Evaluation Date: 12/11/2023  Authorization Period Expiration: 12/5/2024   Plan of Care Expiration: 01/05/2023  Progress Note Due: 01/05/2023  Date of Surgery: NA  Visit # / Visits authorized: 2/ 8   FOTO: to be completed on visit 4      Precautions: Standard and Fall      Time In: 8:12  Time Out:8:58  Total Billable Time: 46 minutes     PTA Visit #: 1/5       Subjective     Patient reports: "I'm not having a good morning".   .  She  was partially  compliant with home exercise program.  Response to previous treatment: No PT treatment completed except initial evaluation  Functional change: Early in Plan of Care     Pain: 5/10  Location: Bilateral LE's        Objective      Objective Measures updated at progress report unless specified.     Treatment     Ana Paula received the treatments listed below:      therapeutic exercises to develop strength, ROM, flexibility, posture, and core stabilization .  See flow-sheet below:      Ther-Ex    Nustep 8 minutes    Wedge 2 minutes    Hamstring Stretch 3 x 30" *    Posterior Pelvic Tilts 30 x 3"    Lower Trunk Rotations 5 x 10" *    Single Knee to Chest Stretch 3 x 10" *    Piriformis Stretch 2 x 20"    Figure 4 Stretch 2 x 20"    Hip Adduction    Hip Abduction                        supervised modalities after being cleared for contradictions: NMES Electrical Stimulation:  Victoria received NMES Electrical Stimulation to elicit muscle contraction of the bilateral Quadratus Lumborum and bilateral Piriformis. Pt received stimulation at a rate of 2 pps with symmetric current x 12 minutes Patient " tolerated treatment well without any adverse effects.     hot pack with Estim to lower back .     Patient Education and Home Exercises       Education provided:   - Plan of Care, Home exercise program, Delayed onset muscle soreness     Written Home Exercises Provided: Patient instructed to cont prior HEP. Exercises were reviewed and Ana Paula was able to demonstrate them prior to the end of the session.  Ana Paula demonstrated fair  understanding of the education provided. See Electronic Medical Record under Patient Instructions for exercises provided during therapy sessions    Assessment     Victoria is a 67 y.o. female referred to outpatient Physical Therapy with a medical diagnosis of history of B LE knee arthroplasty as well as another PT referral from another physician for low back pain. Patient presents with B lower extremity muscle weakness, core and lumbar muscle weakness, low back and B lower extremity pain, and tissue tightness. Patient's impairments are limiting her overall mobility and activity tolerance at this time. LPTA instructed patient in Therapeutic exercises with verbal and visual cues for proper alignment, speed of movement, count, and hold times.  Patient demonstrates difficulty transferring to and from treatment table and requires mod assistance from LPTA.  Patient also requires increased time and effort to progress through exercises.  Patient without reports of increased pain in low back at end of treatment session.       Patient prognosis is Fair.     Patient will continue to benefit from skilled outpatient physical therapy to address the deficits listed in the problem list box on initial evaluation, provide pt/family education and to maximize pt's level of independence in the home and community environment.     Patient's spiritual, cultural and educational needs considered and pt agreeable to plan of care and goals.     Anticipated barriers to physical therapy: chronicity of pain, guarding, compliance with  home exercise program, sedentary lifestyle, lack of improvements with previous PT plan of care      Goals:   Short Term Goals: 2 weeks   Patient will independently complete Home exercise program with correct form.   Patient will report decreased low back pain to less than or equal to 4/10 with standing and walking for improved quality of life.      Long Term Goals: 4 weeks   .Patient will report decrease in pain to 2/10 to improve quality of life  Patient will report decrease in radiating occurrences from 90% of the time to less than or equal to 60% of the time to allow patient the ability to perform activities of daily living   Patient will increase B lower extremity strength to 4+/5 to improve ambulation ability  Patient will increase B hamstring 90/90 by 10 degrees   Patient will have increased FOTO score to greater than or equal to 60% indicating increased function    Plan     Plan of care Certification: 12/11/2023 to 1/05/2024.     Outpatient Physical Therapy 2 times weekly for 4 weeks to include the following interventions: Electrical Stimulation unattended, Manual Therapy, Moist Heat/ Ice, Patient Education, Therapeutic Activities, and Therapeutic Exercise.     Continue Plan of Care per PT order to progress patient toward rehab goals.   Darling Soliman, PTA  12/14/2023

## 2023-12-19 ENCOUNTER — CLINICAL SUPPORT (OUTPATIENT)
Dept: REHABILITATION | Facility: HOSPITAL | Age: 68
End: 2023-12-19
Payer: MEDICARE

## 2023-12-19 DIAGNOSIS — M62.81 MUSCLE WEAKNESS OF LOWER EXTREMITY: Primary | ICD-10-CM

## 2023-12-19 DIAGNOSIS — M54.42 CHRONIC BILATERAL LOW BACK PAIN WITH BILATERAL SCIATICA: ICD-10-CM

## 2023-12-19 DIAGNOSIS — G89.29 CHRONIC BILATERAL LOW BACK PAIN WITH BILATERAL SCIATICA: ICD-10-CM

## 2023-12-19 DIAGNOSIS — M54.41 CHRONIC BILATERAL LOW BACK PAIN WITH BILATERAL SCIATICA: ICD-10-CM

## 2023-12-19 PROCEDURE — 97110 THERAPEUTIC EXERCISES: CPT

## 2023-12-19 PROCEDURE — 97014 ELECTRIC STIMULATION THERAPY: CPT

## 2023-12-19 NOTE — PROGRESS NOTES
"[facility]   Physical Therapy Treatment Note      Name: Victoria Delarosa  Clinic Number: [id number]    Visit Date: 12/19/2023  Therapy Diagnosis: back pain, B LE muscle weakness, and decreased balance      Physician: Jd Mendoza PA and Jefferson Angeles MD      Physician Orders: PT Eval and Treat   Medical Diagnosis from Referral: history of B knee arthroscopy   Evaluation Date: 12/11/2023  Authorization Period Expiration: 12/5/2024   Plan of Care Expiration: 01/05/2023  Progress Note Due: 01/05/2023  Date of Surgery: NA  Visit # / Visits authorized: 3/8   FOTO: to be completed on visit 4      Precautions: Standard and Fall      Time In: 9:35  Time Out:10:20  Total Billable Time: 45 minutes     PTA Visit #: 0/5  Subjective     Patient reports: "My legs and my back are hurting. I stood up a lot yesterday."  .  She  was partially  compliant with home exercise program.  Response to previous treatment: no adverse effect  Functional change: Early in Plan of Care     Pain: 3/10  Location: Bilateral LE's        Objective    Objective Measures updated at progress report unless specified.   Treatment   Ana Paula received the treatments listed below:      therapeutic exercises to develop strength, ROM, flexibility, posture, and core stabilization .  See flow-sheet below:    Ther-Ex Reps   Nustep 6 minutes    Wedge 2 minutes    Hamstring Stretch 3 x 30"    Posterior Pelvic Tilts 30 x 3"    Lower Trunk Rotations 5 x 10" *    Single Knee to Chest Stretch 3 x 10" *    Piriformis Stretch 2 x 20"    Figure 4 Stretch 2 x 20"    Hip Adduction    Hip Abduction                    supervised modalities after being cleared for contradictions: NMES Electrical Stimulation:  Victoria received NMES Electrical Stimulation to elicit muscle contraction of the bilateral Quadratus Lumborum and bilateral Piriformis. Pt received stimulation at a rate of 2 pps with symmetric current x 12 minutes Patient tolerated treatment well " without any adverse effects.     hot pack with Estim to lower back .     Patient Education and Home Exercises       Education provided:   - Plan of Care, Home exercise program, Delayed onset muscle soreness     Written Home Exercises Provided: Patient instructed to cont prior HEP. Exercises were reviewed and Ana Paula was able to demonstrate them prior to the end of the session.  Ana Paula demonstrated fair  understanding of the education provided. See Electronic Medical Record under Patient Instructions for exercises provided during therapy sessions    Assessment     Victoria is a 67 y.o. female referred to outpatient Physical Therapy with a medical diagnosis of history of B LE knee arthroplasty as well as another PT referral from another physician for low back pain. Patient presents with B lower extremity muscle weakness, core and lumbar muscle weakness, low back and B lower extremity pain, and tissue tightness. Patient's impairments are limiting her overall mobility and activity tolerance at this time. Patient required moderate verbal, visual, and tactile cues throughout treatment for proper form, hold times and decreased compensations due to decreased carryover of exercise instruction. Patient required physical assist for SKTC stretch due to decreased flexibility in B knees. Patient had no reports of increased pain or adverse effects to treatment.       Patient prognosis is Fair.     Patient will continue to benefit from skilled outpatient physical therapy to address the deficits listed in the problem list box on initial evaluation, provide pt/family education and to maximize pt's level of independence in the home and community environment.     Patient's spiritual, cultural and educational needs considered and pt agreeable to plan of care and goals.     Anticipated barriers to physical therapy: chronicity of pain, guarding, compliance with home exercise program, sedentary lifestyle, lack of improvements with previous PT plan of  care      Goals:   Short Term Goals: 2 weeks   Patient will independently complete Home exercise program with correct form.   Patient will report decreased low back pain to less than or equal to 4/10 with standing and walking for improved quality of life.      Long Term Goals: 4 weeks   .Patient will report decrease in pain to 2/10 to improve quality of life  Patient will report decrease in radiating occurrences from 90% of the time to less than or equal to 60% of the time to allow patient the ability to perform activities of daily living   Patient will increase B lower extremity strength to 4+/5 to improve ambulation ability  Patient will increase B hamstring 90/90 by 10 degrees   Patient will have increased FOTO score to greater than or equal to 60% indicating increased function    Plan     Plan of care Certification: 12/11/2023 to 1/05/2024.     Outpatient Physical Therapy 2 times weekly for 4 weeks to include the following interventions: Electrical Stimulation unattended, Manual Therapy, Moist Heat/ Ice, Patient Education, Therapeutic Activities, and Therapeutic Exercise.     Continue Plan of Care per PT order to progress patient toward rehab goals.   Calvin Hartley, PT, DPT     12/19/2023

## 2023-12-21 ENCOUNTER — CLINICAL SUPPORT (OUTPATIENT)
Dept: REHABILITATION | Facility: HOSPITAL | Age: 68
End: 2023-12-21
Payer: MEDICARE

## 2023-12-21 DIAGNOSIS — M54.41 CHRONIC BILATERAL LOW BACK PAIN WITH BILATERAL SCIATICA: ICD-10-CM

## 2023-12-21 DIAGNOSIS — M62.81 MUSCLE WEAKNESS OF LOWER EXTREMITY: Primary | ICD-10-CM

## 2023-12-21 DIAGNOSIS — M54.42 CHRONIC BILATERAL LOW BACK PAIN WITH BILATERAL SCIATICA: ICD-10-CM

## 2023-12-21 DIAGNOSIS — G89.29 CHRONIC BILATERAL LOW BACK PAIN WITH BILATERAL SCIATICA: ICD-10-CM

## 2023-12-21 PROCEDURE — 97110 THERAPEUTIC EXERCISES: CPT | Mod: CQ

## 2023-12-21 PROCEDURE — 97014 ELECTRIC STIMULATION THERAPY: CPT | Mod: CQ

## 2023-12-21 PROCEDURE — 97140 MANUAL THERAPY 1/> REGIONS: CPT | Mod: CQ

## 2023-12-21 NOTE — PROGRESS NOTES
"[facility]   Physical Therapy Treatment Note      Name: Victoria Delarosa  Clinic Number: [id number]    Visit Date: 12/21/2023  Therapy Diagnosis: back pain, B LE muscle weakness, and decreased balance      Physician: Jd Mendoza PA and Jefferson Angeles MD      Physician Orders: PT Eval and Treat   Medical Diagnosis from Referral: history of B knee arthroscopy   Evaluation Date: 12/11/2023  Authorization Period Expiration: 12/5/2024   Plan of Care Expiration: 01/05/2023  Progress Note Due: 01/05/2023  Date of Surgery: NA  Visit # / Visits authorized: 4/8   FOTO: to be completed on visit 4      Precautions: Standard and Fall      Time In: 8:00  Time Out: 8:53  Total Billable Time: 53 minutes     PTA Visit #: 1/5  Subjective     Patient reports: "My back and both legs are hurting. I stepped a certain way when I left here the other day and I've been hurting every since."  .  She  was partially  compliant with home exercise program.  Response to previous treatment: no adverse effect  Functional change: Early in Plan of Care     Pain: 8/10  Location: Bilateral LE's        Objective    Objective Measures updated at progress report unless specified.   Treatment   Ana Paula received the treatments listed below:      therapeutic exercises to develop strength, ROM, flexibility, posture, and core stabilization .  See flow-sheet below:    Ther-Ex Reps   Nustep 6 minutes    Wedge 2 minutes    Hamstring Stretch 3 x 30"    Posterior Pelvic Tilts 30 x 3"    Lower Trunk Rotations 5 x 10"    Single Knee to Chest Stretch 3 x 10"    Piriformis Stretch 2 x 30"    Figure 4 Stretch 2 x 30"    Hip Adduction    Hip Abduction                    Manual Therapy: Active neuromuscular releases of bilateral iliopsoas completed by LPTA with mod effort to decrease tissue irritation, tightness, and pain. Patient reported unchanged in reported symptoms. LPTA noted improved tissue irritability and tightness post manual tx.  "     supervised modalities after being cleared for contradictions: NMES Electrical Stimulation:  Victoria received NMES Electrical Stimulation to elicit muscle contraction of the bilateral Quadratus Lumborum and bilateral Piriformis. Pt received stimulation at a rate of 2 pps with symmetric current x 12 minutes Patient tolerated treatment well without any adverse effects.     hot pack with Estim to lower back .     Patient Education and Home Exercises       Education provided:   - Plan of Care, Home exercise program, Delayed onset muscle soreness     Written Home Exercises Provided: Patient instructed to cont prior HEP. Exercises were reviewed and Ana Paula was able to demonstrate them prior to the end of the session.  Ana Paula demonstrated fair  understanding of the education provided. See Electronic Medical Record under Patient Instructions for exercises provided during therapy sessions    Assessment     Victoria is a 67 y.o. female referred to outpatient Physical Therapy with a medical diagnosis of history of B LE knee arthroplasty as well as another PT referral from another physician for low back pain. Patient presents with B lower extremity muscle weakness, core and lumbar muscle weakness, low back and B lower extremity pain, and tissue tightness. Patient's impairments are limiting her overall mobility and activity tolerance at this time. Pt  continues to require moderate verbal, visual, and tactile cues throughout tx for proper form, hold times and decreased compensations. LPTA assessed and corrected iliopsoas tightness secondary to increase c/o medial knee pain. Pt did not require manual assistance for progression through stretches during today's tx. No adverse effects noted from tx.      Patient prognosis is Fair.     Patient will continue to benefit from skilled outpatient physical therapy to address the deficits listed in the problem list box on initial evaluation, provide pt/family education and to maximize pt's level of  independence in the home and community environment.     Patient's spiritual, cultural and educational needs considered and pt agreeable to plan of care and goals.     Anticipated barriers to physical therapy: chronicity of pain, guarding, compliance with home exercise program, sedentary lifestyle, lack of improvements with previous PT plan of care      Goals:   Short Term Goals: 2 weeks   Patient will independently complete Home exercise program with correct form.   Patient will report decreased low back pain to less than or equal to 4/10 with standing and walking for improved quality of life.      Long Term Goals: 4 weeks   .Patient will report decrease in pain to 2/10 to improve quality of life  Patient will report decrease in radiating occurrences from 90% of the time to less than or equal to 60% of the time to allow patient the ability to perform activities of daily living   Patient will increase B lower extremity strength to 4+/5 to improve ambulation ability  Patient will increase B hamstring 90/90 by 10 degrees   Patient will have increased FOTO score to greater than or equal to 60% indicating increased function    Plan     Plan of care Certification: 12/11/2023 to 1/05/2024.     Outpatient Physical Therapy 2 times weekly for 4 weeks to include the following interventions: Electrical Stimulation unattended, Manual Therapy, Moist Heat/ Ice, Patient Education, Therapeutic Activities, and Therapeutic Exercise.     Continue Plan of Care per PT order to progress patient toward rehab goals.   EYAL Keating  12/21/2023

## 2024-01-02 ENCOUNTER — CLINICAL SUPPORT (OUTPATIENT)
Dept: REHABILITATION | Facility: HOSPITAL | Age: 69
End: 2024-01-02
Payer: MEDICARE

## 2024-01-02 DIAGNOSIS — G89.29 CHRONIC BILATERAL LOW BACK PAIN WITH BILATERAL SCIATICA: ICD-10-CM

## 2024-01-02 DIAGNOSIS — M54.42 CHRONIC BILATERAL LOW BACK PAIN WITH BILATERAL SCIATICA: ICD-10-CM

## 2024-01-02 DIAGNOSIS — M62.81 MUSCLE WEAKNESS OF LOWER EXTREMITY: Primary | ICD-10-CM

## 2024-01-02 DIAGNOSIS — M54.41 CHRONIC BILATERAL LOW BACK PAIN WITH BILATERAL SCIATICA: ICD-10-CM

## 2024-01-02 PROCEDURE — 97014 ELECTRIC STIMULATION THERAPY: CPT | Mod: CQ

## 2024-01-02 PROCEDURE — 97110 THERAPEUTIC EXERCISES: CPT | Mod: CQ

## 2024-01-02 NOTE — PROGRESS NOTES
"[facility]   Physical Therapy Treatment Note      Name: Victoria Delarosa  Clinic Number: [id number]    Visit Date: 1/2/2024  Therapy Diagnosis: back pain, B LE muscle weakness, and decreased balance      Physician: Jd Mendoza PA and Jefferson Angeles MD      Physician Orders: PT Eval and Treat   Medical Diagnosis from Referral: history of B knee arthroscopy   Evaluation Date: 12/11/2023  Authorization Period Expiration: 12/5/2024   Plan of Care Expiration: 01/05/2023  Progress Note Due: 01/05/2023  Date of Surgery: NA  Visit # / Visits authorized: 5/8   FOTO: to be completed on visit 4      Precautions: Standard and Fall      Time In: 8:45  Time Out: 9:30  Total Billable Time: 45 minutes     PTA Visit #: 1/5  Subjective     Patient reports: "I feel okay today."  .  She  was partially  compliant with home exercise program.  Response to previous treatment: no adverse effect  Functional change: Early in Plan of Care     Pain: 5/10  Location: Bilateral LE's        Objective    Objective Measures updated at progress report unless specified.   Treatment   Ana Paula received the treatments listed below:      therapeutic exercises to develop strength, ROM, flexibility, posture, and core stabilization .  See flow-sheet below: 35 minutes     Ther-Ex Reps   Nustep 6 minutes    Wedge 2 minutes    Hamstring Stretch 3 x 30"    Posterior Pelvic Tilts 30 x 3"    Lower Trunk Rotations 5 x 10"    Single Knee to Chest Stretch 3 x 10"    Piriformis Stretch 2 x 30"    Figure 4 Stretch 2 x 30"    Hip Adduction    Hip Abduction                    NOT COMPLETED---Manual Therapy: Active neuromuscular releases of bilateral iliopsoas completed by LPTA with mod effort to decrease tissue irritation, tightness, and pain. Patient reported unchanged in reported symptoms. LPTA noted improved tissue irritability and tightness post manual tx.      supervised modalities after being cleared for contradictions: NMES Electrical " Stimulation:  Victoria received NMES Electrical Stimulation to elicit muscle contraction of the bilateral Quadratus Lumborum and bilateral Piriformis. Pt received stimulation at a rate of 2 pps with symmetric current x 12 minutes Patient tolerated treatment well without any adverse effects.     hot pack with Estim to lower back .     Patient Education and Home Exercises       Education provided:   - Plan of Care, Home exercise program, Delayed onset muscle soreness     Written Home Exercises Provided: Patient instructed to cont prior HEP. Exercises were reviewed and Ana Paula was able to demonstrate them prior to the end of the session.  Ana Paula demonstrated fair  understanding of the education provided. See Electronic Medical Record under Patient Instructions for exercises provided during therapy sessions    Assessment     Victoria is a 67 y.o. female referred to outpatient Physical Therapy with a medical diagnosis of history of B LE knee arthroplasty as well as another PT referral from another physician for low back pain. Patient presents with B lower extremity muscle weakness, core and lumbar muscle weakness, low back and B lower extremity pain, and tissue tightness. Patient's impairments are limiting her overall mobility and activity tolerance at this time. Pt displays improved carryover of exercises with moderate verbal cueing required throughout tx for proper hold times and decreased compensations. Manual therapy with-held secondary to no improvement in medial knee pain following release. Pt progressed through stretches quickly with no c/o increased pain. No adverse effects noted from tx.      Patient prognosis is Fair.     Patient will continue to benefit from skilled outpatient physical therapy to address the deficits listed in the problem list box on initial evaluation, provide pt/family education and to maximize pt's level of independence in the home and community environment.     Patient's spiritual, cultural and  educational needs considered and pt agreeable to plan of care and goals.     Anticipated barriers to physical therapy: chronicity of pain, guarding, compliance with home exercise program, sedentary lifestyle, lack of improvements with previous PT plan of care      Goals:   Short Term Goals: 2 weeks   Patient will independently complete Home exercise program with correct form.   Patient will report decreased low back pain to less than or equal to 4/10 with standing and walking for improved quality of life.      Long Term Goals: 4 weeks   .Patient will report decrease in pain to 2/10 to improve quality of life  Patient will report decrease in radiating occurrences from 90% of the time to less than or equal to 60% of the time to allow patient the ability to perform activities of daily living   Patient will increase B lower extremity strength to 4+/5 to improve ambulation ability  Patient will increase B hamstring 90/90 by 10 degrees   Patient will have increased FOTO score to greater than or equal to 60% indicating increased function    Plan     Plan of care Certification: 12/11/2023 to 1/05/2024.     Outpatient Physical Therapy 2 times weekly for 4 weeks to include the following interventions: Electrical Stimulation unattended, Manual Therapy, Moist Heat/ Ice, Patient Education, Therapeutic Activities, and Therapeutic Exercise.     Continue Plan of Care per PT order to progress patient toward rehab goals.   EYAL Keating  1/2/2024

## 2024-01-04 ENCOUNTER — CLINICAL SUPPORT (OUTPATIENT)
Dept: REHABILITATION | Facility: HOSPITAL | Age: 69
End: 2024-01-04
Payer: MEDICARE

## 2024-01-04 DIAGNOSIS — G89.29 CHRONIC BILATERAL LOW BACK PAIN WITH BILATERAL SCIATICA: ICD-10-CM

## 2024-01-04 DIAGNOSIS — M54.42 CHRONIC BILATERAL LOW BACK PAIN WITH BILATERAL SCIATICA: ICD-10-CM

## 2024-01-04 DIAGNOSIS — M54.41 CHRONIC BILATERAL LOW BACK PAIN WITH BILATERAL SCIATICA: ICD-10-CM

## 2024-01-04 DIAGNOSIS — M62.81 MUSCLE WEAKNESS OF LOWER EXTREMITY: Primary | ICD-10-CM

## 2024-01-04 PROCEDURE — 97014 ELECTRIC STIMULATION THERAPY: CPT | Mod: CQ

## 2024-01-04 PROCEDURE — 97110 THERAPEUTIC EXERCISES: CPT | Mod: CQ

## 2024-01-04 PROCEDURE — 97140 MANUAL THERAPY 1/> REGIONS: CPT | Mod: CQ

## 2024-01-04 NOTE — PROGRESS NOTES
"[facility]   Physical Therapy Treatment Note      Name: Victoria Delarosa  Clinic Number: [id number]    Visit Date: 1/4/2024  Therapy Diagnosis: back pain, B LE muscle weakness, and decreased balance      Physician: Jd Mendoza PA and Jefferson Angeles MD      Physician Orders: PT Eval and Treat   Medical Diagnosis from Referral: history of B knee arthroscopy   Evaluation Date: 12/11/2023  Authorization Period Expiration: 12/5/2024   Plan of Care Expiration: 01/05/2023  Progress Note Due: 01/05/2023  Date of Surgery: NA  Visit # / Visits authorized: 6/8   FOTO: to be completed on visit 4      Precautions: Standard and Fall      Time In: 8:05  Time Out: 9:30  Total Billable Time: 45minutes     PTA Visit #: 1/5  Subjective     Patient reports: "My back is my Left Leg are hurting pretty bad today.  I'm supposed to go back to the doctor tomorrow".   .  She  was partially  compliant with home exercise program.  Response to previous treatment: no adverse effect  Functional change: Early in Plan of Care     Pain: 7/10  Location: low back, Left LE     Objective    Objective Measures updated at progress report unless specified.   Treatment   Ana Paula received the treatments listed below:      therapeutic exercises to develop strength, ROM, flexibility, posture, and core stabilization .  See flow-sheet below: 35 minutes     Ther-Ex Reps   Nustep 6 minutes    Wedge 2 minutes    Hamstring Stretch 3 x 30"    Posterior Pelvic Tilts 30 x 3"    Lower Trunk Rotations 5 x 10"    Single Knee to Chest Stretch 3 x 10"    Piriformis Stretch 2 x 30"    Figure 4 Stretch 2 x 30"    Hip Adduction    Hip Abduction                    Manual Therapy: Active neuromuscular releases and STM/MFR of Left and Right Quadratus Lumborum, Piriformis, Glut Min, and Glut med.  Muscle energy technique to correct Left Pelvic up-slip with mod effort by LPTA.     supervised modalities after being cleared for contradictions: NMES Electrical " Stimulation:  Victoria received NMES Electrical Stimulation to elicit muscle contraction of the bilateral Quadratus Lumborum and bilateral Piriformis. Pt received stimulation at a rate of 2 pps with symmetric current x 12 minutes Patient tolerated treatment well without any adverse effects.     hot pack with Estim to lower back .     Patient Education and Home Exercises       Education provided:   - Plan of Care, Home exercise program, Delayed onset muscle soreness     Written Home Exercises Provided: Patient instructed to cont prior HEP. Exercises were reviewed and Ana Paula was able to demonstrate them prior to the end of the session.  Ana Paula demonstrated fair  understanding of the education provided. See Electronic Medical Record under Patient Instructions for exercises provided during therapy sessions    Assessment     Victoria is a 67 y.o. female referred to outpatient Physical Therapy with a medical diagnosis of history of B LE knee arthroplasty as well as another PT referral from another physician for low back pain. Patient presents with B lower extremity muscle weakness, core and lumbar muscle weakness, low back and B lower extremity pain, and tissue tightness. Patient's impairments are limiting her overall mobility and activity tolerance at this time.  Patient reports improved pain level in low back and L LE at end of physical therapy treatment session and rates pain level decreased to 3/10.  Calvin Hartley DPT discussed pending discharge from physical therapy services depending on follow up visit with referring doctor tomorrow.     Patient prognosis is Fair.     Patient will continue to benefit from skilled outpatient physical therapy to address the deficits listed in the problem list box on initial evaluation, provide pt/family education and to maximize pt's level of independence in the home and community environment.     Patient's spiritual, cultural and educational needs considered and pt agreeable to plan of care and  goals.     Anticipated barriers to physical therapy: chronicity of pain, guarding, compliance with home exercise program, sedentary lifestyle, lack of improvements with previous PT plan of care      Goals:   Short Term Goals: 2 weeks   Patient will independently complete Home exercise program with correct form.   Patient will report decreased low back pain to less than or equal to 4/10 with standing and walking for improved quality of life.      Long Term Goals: 4 weeks   .Patient will report decrease in pain to 2/10 to improve quality of life  Patient will report decrease in radiating occurrences from 90% of the time to less than or equal to 60% of the time to allow patient the ability to perform activities of daily living   Patient will increase B lower extremity strength to 4+/5 to improve ambulation ability  Patient will increase B hamstring 90/90 by 10 degrees   Patient will have increased FOTO score to greater than or equal to 60% indicating increased function    Plan     Plan of care Certification: 12/11/2023 to 1/05/2024.     Outpatient Physical Therapy 2 times weekly for 4 weeks to include the following interventions: Electrical Stimulation unattended, Manual Therapy, Moist Heat/ Ice, Patient Education, Therapeutic Activities, and Therapeutic Exercise.     Pending discharge from physical therapy services per PT order.   EYAL Garcia   1/4/2024

## 2024-01-08 ENCOUNTER — DOCUMENTATION ONLY (OUTPATIENT)
Dept: REHABILITATION | Facility: HOSPITAL | Age: 69
End: 2024-01-08
Payer: MEDICARE

## 2024-01-08 PROBLEM — M62.81 MUSCLE WEAKNESS OF LOWER EXTREMITY: Status: RESOLVED | Noted: 2023-12-11 | Resolved: 2024-01-08

## 2024-01-08 PROBLEM — M54.41 CHRONIC BILATERAL LOW BACK PAIN WITH BILATERAL SCIATICA: Status: RESOLVED | Noted: 2023-12-11 | Resolved: 2024-01-08

## 2024-01-08 PROBLEM — M54.42 CHRONIC BILATERAL LOW BACK PAIN WITH BILATERAL SCIATICA: Status: RESOLVED | Noted: 2023-12-11 | Resolved: 2024-01-08

## 2024-01-08 PROBLEM — G89.29 CHRONIC BILATERAL LOW BACK PAIN WITH BILATERAL SCIATICA: Status: RESOLVED | Noted: 2023-12-11 | Resolved: 2024-01-08

## 2024-01-08 NOTE — PROGRESS NOTES
[facility]  PT Discharge Note    Name: Victoria Delarosa  Clinic Number: [id number]    Therapy Diagnosis: back pain, B LE muscle weakness, and decreased balance      Physician: Jd Mendoza PA and Jefferson Angeles MD      Physician Orders: PT Eval and Treat   Medical Diagnosis from Referral: history of B knee arthroscopy   Evaluation Date: 12/11/2023  Date of Last visit: 1/4/2024  Total Visits Received: 6    ASSESSMENT    Patient continues to demonstrate B LE weakness that is limiting her safety and activity tolerance with functional mobility tasks. Patient continues to report low back pain with increased activity levels. She is scheduled for an MRI of her lumbar spine. PT to discharge at this time pending results of lumbar MRI due to decreased progress with PT interventions.     Discharge reason: Patient has reached the maximum rehab potential for the present time    Discharge FOTO Score: 51 %     Goals:   Patient will independently complete Home exercise program with correct form. -MET   Patient will report decreased low back pain to less than or equal to 4/10 with standing and walking for improved quality of life. -NOT MET      Long Term Goals: 4 weeks   .Patient will report decrease in pain to 2/10 to improve quality of life-NOT MET   Patient will report decrease in radiating occurrences from 90% of the time to less than or equal to 60% of the time to allow patient the ability to perform activities of daily living -NOT MET   Patient will increase B lower extremity strength to 4+/5 to improve ambulation ability-NOT MET   Patient will increase B hamstring 90/90 by 10 degrees -NOT MET   Patient will have increased FOTO score to greater than or equal to 60% indicating increased function-NOT MET     PLAN   This patient is discharged from Physical Therapy      Calvin Hartley, PT , DPT

## 2024-01-22 RX ORDER — SPIRONOLACTONE 25 MG/1
25 TABLET ORAL DAILY
Qty: 30 TABLET | Refills: 0 | Status: SHIPPED | OUTPATIENT
Start: 2024-01-22 | End: 2024-02-21 | Stop reason: SDUPTHER

## 2024-01-22 RX ORDER — SERTRALINE HYDROCHLORIDE 100 MG/1
100 TABLET, FILM COATED ORAL DAILY
Qty: 30 TABLET | Refills: 0 | Status: SHIPPED | OUTPATIENT
Start: 2024-01-22 | End: 2024-02-21 | Stop reason: SDUPTHER

## 2024-01-22 NOTE — TELEPHONE ENCOUNTER
----- Message from Gianna Mcfadden sent at 1/22/2024 10:05 AM CST -----  Regarding: refill  Patient needs a refill on Spironolactone 25 mg called into [facility] pharmacy at [phone].  Please call patient at  [phone] if you have any questions. Thanks!

## 2024-01-22 NOTE — TELEPHONE ENCOUNTER
Spoke with patient-notification of time for  a check up. Patient reports has multiple appointments this week. Will call back to schedule a check up.

## 2024-01-23 ENCOUNTER — HOSPITAL ENCOUNTER (OUTPATIENT)
Dept: RADIOLOGY | Facility: HOSPITAL | Age: 69
Discharge: HOME | End: 2024-01-23
Attending: NURSE PRACTITIONER
Payer: MEDICARE

## 2024-01-23 DIAGNOSIS — M48.062 SPINAL STENOSIS, LUMBAR REGION, WITH NEUROGENIC CLAUDICATION: ICD-10-CM

## 2024-01-23 PROCEDURE — 72148 MRI LUMBAR SPINE W/O DYE: CPT | Mod: TC

## 2024-01-25 ENCOUNTER — OFFICE VISIT (OUTPATIENT)
Dept: FAMILY MEDICINE | Facility: CLINIC | Age: 69
End: 2024-01-25
Payer: MEDICARE

## 2024-01-25 VITALS
WEIGHT: 211.38 LBS | HEIGHT: 67 IN | OXYGEN SATURATION: 98 % | HEART RATE: 76 BPM | TEMPERATURE: 98 F | BODY MASS INDEX: 33.18 KG/M2 | DIASTOLIC BLOOD PRESSURE: 70 MMHG | SYSTOLIC BLOOD PRESSURE: 102 MMHG

## 2024-01-25 DIAGNOSIS — I10 HYPERTENSION, UNSPECIFIED TYPE: ICD-10-CM

## 2024-01-25 DIAGNOSIS — M54.50 LOW BACK PAIN WITHOUT SCIATICA, UNSPECIFIED BACK PAIN LATERALITY, UNSPECIFIED CHRONICITY: ICD-10-CM

## 2024-01-25 DIAGNOSIS — J01.00 ACUTE NON-RECURRENT MAXILLARY SINUSITIS: Primary | ICD-10-CM

## 2024-01-25 PROCEDURE — 99213 OFFICE O/P EST LOW 20 MIN: CPT | Mod: ,,, | Performed by: FAMILY MEDICINE

## 2024-01-25 PROCEDURE — 96372 THER/PROPH/DIAG INJ SC/IM: CPT | Mod: ,,, | Performed by: FAMILY MEDICINE

## 2024-01-25 RX ORDER — KETOROLAC TROMETHAMINE 30 MG/ML
30 INJECTION, SOLUTION INTRAMUSCULAR; INTRAVENOUS
Status: COMPLETED | OUTPATIENT
Start: 2024-01-25 | End: 2024-01-25

## 2024-01-25 RX ORDER — METHYLPREDNISOLONE ACETATE 80 MG/ML
40 INJECTION, SUSPENSION INTRA-ARTICULAR; INTRALESIONAL; INTRAMUSCULAR; SOFT TISSUE
Status: COMPLETED | OUTPATIENT
Start: 2024-01-25 | End: 2024-01-25

## 2024-01-25 RX ORDER — BROMPHENIRAMINE MALEATE, PSEUDOEPHEDRINE HYDROCHLORIDE, AND DEXTROMETHORPHAN HYDROBROMIDE 2; 30; 10 MG/5ML; MG/5ML; MG/5ML
10 SYRUP ORAL EVERY 4 HOURS PRN
Qty: 240 ML | Refills: 0 | Status: SHIPPED | OUTPATIENT
Start: 2024-01-25 | End: 2024-02-04

## 2024-01-25 RX ORDER — KETOROLAC TROMETHAMINE 30 MG/ML
30 INJECTION, SOLUTION INTRAMUSCULAR; INTRAVENOUS
Status: DISCONTINUED | OUTPATIENT
Start: 2024-01-25 | End: 2024-01-25

## 2024-01-25 RX ORDER — DEXAMETHASONE SODIUM PHOSPHATE 4 MG/ML
4 INJECTION, SOLUTION INTRA-ARTICULAR; INTRALESIONAL; INTRAMUSCULAR; INTRAVENOUS; SOFT TISSUE
Status: COMPLETED | OUTPATIENT
Start: 2024-01-25 | End: 2024-01-25

## 2024-01-25 RX ORDER — CEFUROXIME AXETIL 500 MG/1
500 TABLET ORAL 2 TIMES DAILY
Qty: 14 TABLET | Refills: 0 | Status: SHIPPED | OUTPATIENT
Start: 2024-01-25

## 2024-01-25 RX ADMIN — DEXAMETHASONE SODIUM PHOSPHATE 4 MG: 4 INJECTION, SOLUTION INTRA-ARTICULAR; INTRALESIONAL; INTRAMUSCULAR; INTRAVENOUS; SOFT TISSUE at 10:01

## 2024-01-25 RX ADMIN — METHYLPREDNISOLONE ACETATE 40 MG: 80 INJECTION, SUSPENSION INTRA-ARTICULAR; INTRALESIONAL; INTRAMUSCULAR; SOFT TISSUE at 10:01

## 2024-01-25 RX ADMIN — KETOROLAC TROMETHAMINE 30 MG: 30 INJECTION, SOLUTION INTRAMUSCULAR; INTRAVENOUS at 10:01

## 2024-01-25 NOTE — PROGRESS NOTES
Tim Burgos MD   [facility]  [address] [place] [address]     PATIENT NAME: Victoria Delarosa  : 1955  DATE: 24  MRN: [id number]      Billing Provider: Tim Burgos MD  Level of Service: [place] OFFICE/OUTPT VISIT, EST, LEVL III, 20-29 MIN  Patient PCP Information       Provider PCP Type    Tim Burgos MD General            Reason for Visit / Chief Complaint: Sinusitis (Cough, congestion, ears stopped up, little SOB x2-3 day. Patient states she is coughing up whitish yellow phlegm. Patient very weak and fatigued, fell twice in the past week. Patient was exposed to COVID last Saturday.), Knee Pain (Chronic knee pain), and Low-back Pain (Chronic low back pain Patient had to postpone an appointment in mobile today for MRI results.)         History of Present Illness / Problem Focused Workflow     Victoria Delarosa presents to the clinic with Sinusitis (Cough, congestion, ears stopped up, little SOB x2-3 day. Patient states she is coughing up whitish yellow phlegm. Patient very weak and fatigued, fell twice in the past week. Patient was exposed to COVID last Saturday.), Knee Pain (Chronic knee pain), and Low-back Pain (Chronic low back pain Patient had to postpone an appointment in mobile today for MRI results.)     HPI    Review of Systems     Review of Systems   Constitutional:  Negative for activity change, appetite change, fatigue and fever.   HENT:  Positive for nasal congestion, sinus pressure/congestion and sore throat. Negative for ear pain and hearing loss.    Respiratory:  Positive for cough. Negative for chest tightness and shortness of breath.    Cardiovascular:  Negative for chest pain and palpitations.   Gastrointestinal:  Negative for abdominal pain and fecal incontinence.   Genitourinary:  Negative for bladder incontinence and difficulty urinating.   Musculoskeletal:  Negative for arthralgias.   Integumentary:  Negative for rash.   Neurological:   Negative for dizziness and headaches.        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    BMI 32.0-32.9,adult     Bronchitis     CHF (congestive heart failure)     Cobalamin deficiency     Constipation 5/22/2023    Contusion of left knee 8/12/2022    Contusion of right knee 8/12/2022    Deep vein thrombosis     Depressive disorder     Diarrhea 12/7/2022    Fall 8/12/2022    General anesthetics causing adverse effect in therapeutic use     GERD (gastroesophageal reflux disease)     Heart attack     20 yrs ago    Hypertension     Hypothyroidism     Lower extremity edema     Neuropathy     Osteoarthritis of both knees     Pain in left leg     Pain in right leg     Peripheral edema     Pulmonary embolism 2017    Stroke     Varicose veins of bilateral lower extremities with pain     Vitamin D deficiency        Past Surgical History:   Procedure Laterality Date    BILATERAL TUBAL LIGATION      CARDIAC CATHETERIZATION      COLONOSCOPY      EYE SURGERY      knee scope Left     THYROIDECTOMY, PARTIAL      TONSILLECTOMY, ADENOIDECTOMY      TOTAL KNEE ARTHROPLASTY Bilateral     TOTAL KNEE ARTHROPLASTY Right     TUBAL LIGATION      VAGINAL DELIVERY      x 3       Social History  Victoria Delarosa  reports that she has never smoked. She has been exposed to tobacco smoke. She has never used smokeless tobacco. She reports that she does not drink alcohol and does not use drugs.    Family History  Victoria Delarosa  family history includes Arthritis in her brother and sister; Heart disease in her brother; Stroke in her brother.    Medications and Allergies     Medications  Outpatient Medications Marked as Taking for the 1/25/24 encounter (Office Visit) with Tim Burgos MD   Medication Sig Dispense Refill    calcium-vitamin D 600 mg-10 mcg (400 unit) Tab Take 1 tablet by  mouth 2 (two) times a day. 180 tablet 3    cyanocobalamin 1,000 mcg/mL injection INJECT 1 ML EVERY MONTH 1 mL 11    ezetimibe (ZETIA) 10 mg tablet Take 10 mg by mouth once daily.      furosemide (LASIX) 20 MG tablet Take 1 tablet (20 mg total) by mouth once daily. 90 tablet 1    levothyroxine (SYNTHROID) 25 MCG tablet Take 1 tablet (25 mcg total) by mouth once daily. 90 tablet 1    losartan (COZAAR) 50 MG tablet Take 1 tablet (50 mg total) by mouth once daily. 90 tablet 1    magnesium oxide (MAG-OX) 400 mg (241.3 mg magnesium) tablet Take 1 tablet by mouth once daily.      oxybutynin (DITROPAN) 5 MG Tab Take 1 tablet by mouth 2 (two) times daily.      potassium chloride (KLOR-CON) 10 MEQ TbSR Take 1 tablet (10 mEq total) by mouth once daily. 90 tablet 1    ranolazine (RANEXA) 500 MG Tb12 Take 500 mg by mouth 2 (two) times daily.      sertraline (ZOLOFT) 100 MG tablet TAKE 1 TABLET (100 MG TOTAL) BY MOUTH ONCE DAILY. 30 tablet 0    spironolactone (ALDACTONE) 25 MG tablet Take 1 tablet (25 mg total) by mouth once daily. 30 tablet 0    warfarin (COUMADIN) 5 MG tablet Take 5 mg by mouth Daily.       Current Facility-Administered Medications for the 1/25/24 encounter (Office Visit) with Tim Burgos MD   Medication Dose Route Frequency Provider Last Rate Last Admin    [COMPLETED] dexAMETHasone injection 4 mg  4 mg Intramuscular 1 time in Clinic/Tim Jackson MD   4 mg at 01/25/24 1055    [COMPLETED] ketorolac injection 30 mg  30 mg Intramuscular 1 time in Clinic/Tim Jackson MD   30 mg at 01/25/24 1055    [COMPLETED] methylPREDNISolone acetate injection 40 mg  40 mg Intramuscular 1 time in Clinic/Tim Jackson MD   40 mg at 01/25/24 1055    [DISCONTINUED] ketorolac injection 30 mg  30 mg Intramuscular 1 time in Clinic/Tim Jackson MD           Allergies  Review of patient's allergies indicates:   Allergen Reactions    Lisinopril      Cough      Statins-hmg-coa  reductase inhibitors Hives and Itching       Physical Examination     Vitals:    01/25/24 1033   BP: 102/70   Pulse: 76   Temp: 98 °F (36.7 °C)     Physical Exam  Constitutional:       Appearance: Normal appearance.   HENT:      Head: Normocephalic and atraumatic.      Right Ear: Tympanic membrane normal.      Left Ear: Tympanic membrane normal.      Nose: Congestion and rhinorrhea present.      Mouth/Throat:      Pharynx: Posterior oropharyngeal erythema present.   Eyes:      Pupils: Pupils are equal, round, and reactive to light.   Cardiovascular:      Rate and Rhythm: Normal rate and regular rhythm.      Pulses: Normal pulses.      Heart sounds: Normal heart sounds.   Pulmonary:      Breath sounds: No wheezing or rhonchi.   Abdominal:      Palpations: Abdomen is soft.   Lymphadenopathy:      Cervical: Cervical adenopathy present.   Skin:     General: Skin is warm and dry.   Neurological:      Mental Status: She is alert.          Assessment and Plan (including Health Maintenance)   :    Plan:         Health Maintenance Due   Topic Date Due    TETANUS VACCINE  Never done    Hemoglobin A1c (Diabetic Prevention Screening)  Never done    Shingles Vaccine (1 of 2) Never done    RSV Vaccine (Age 60+ and Pregnant patients) (1 - 1-dose 60+ series) Never done    Influenza Vaccine (1) 09/01/2023    COVID-19 Vaccine (4 - 2023-24 season) 09/01/2023    Mammogram  03/29/2024       Problem List Items Addressed This Visit          Cardiac/Vascular    Hypertension     Other Visit Diagnoses       Acute non-recurrent maxillary sinusitis    -  Primary    Relevant Medications    dexAMETHasone injection 4 mg (Completed)    methylPREDNISolone acetate injection 40 mg (Completed)    brompheniramine-pseudoeph-DM (BROMFED DM) 2-30-10 mg/5 mL Syrp    cefUROXime (CEFTIN) 500 MG tablet    ketorolac injection 30 mg (Completed)    Low back pain without sciatica, unspecified back pain laterality, unspecified chronicity              Acute  non-recurrent maxillary sinusitis  -     dexAMETHasone injection 4 mg  -     methylPREDNISolone acetate injection 40 mg  -     brompheniramine-pseudoeph-DM (BROMFED DM) 2-30-10 mg/5 mL Syrp; Take 10 mLs by mouth every 4 (four) hours as needed (cough).  Dispense: 240 mL; Refill: 0  -     cefUROXime (CEFTIN) 500 MG tablet; Take 1 tablet (500 mg total) by mouth 2 (two) times daily.  Dispense: 14 tablet; Refill: 0  -     ketorolac injection 30 mg    Low back pain without sciatica, unspecified back pain laterality, unspecified chronicity    Hypertension, unspecified type    Other orders  -     Discontinue: ketorolac injection 30 mg       Health Maintenance Topics with due status: Not Due       Topic Last Completion Date    Pneumococcal Vaccines (Age 65+) 06/04/2019    Colorectal Cancer Screening 11/28/2022    Lipid Panel 07/17/2023    DEXA Scan 08/10/2023       Procedures     Future Appointments   Date Time Provider Department Center   6/3/2024 11:20 AM [facility]        No follow-ups on file.       Signature:  Tim Burgos MD  [facility]  [address] [facility]   [place] [address]  [phone] Phone  [phone] Fax    Date of encounter: 1/25/24

## 2024-01-25 NOTE — PROGRESS NOTES
Tim Burgos MD   [facility]  [address] [place] [address]     PATIENT NAME: Victoria Delarosa  : 1955  DATE: 24  MRN: [id number]      Billing Provider: Tim Burgos MD  Level of Service:   Patient PCP Information       Provider PCP Type    Tim Burgos MD General            Reason for Visit / Chief Complaint: Sinusitis (Cough, congestion, ears stopped up, little SOB x2-3 day. Patient states she is coughing up whitish yellow phlegm. Patient very weak and fatigued, fell twice in the past week. Patient was exposed to COVID last Saturday.), Knee Pain (Chronic knee pain), and Low-back Pain (Chronic low back pain Patient had to postpone an appointment in mobile today for MRI results.)         History of Present Illness / Problem Focused Workflow     Victoria Delarosa presents to the clinic with Sinusitis (Cough, congestion, ears stopped up, little SOB x2-3 day. Patient states she is coughing up whitish yellow phlegm. Patient very weak and fatigued, fell twice in the past week. Patient was exposed to COVID last Saturday.), Knee Pain (Chronic knee pain), and Low-back Pain (Chronic low back pain Patient had to postpone an appointment in mobile today for MRI results.)     HPI    Review of Systems     Review of Systems   Constitutional:  Negative for activity change, appetite change, fatigue and fever.   HENT:  Positive for nasal congestion, sinus pressure/congestion and sore throat. Negative for ear pain and hearing loss.    Respiratory:  Positive for cough. Negative for chest tightness and shortness of breath.    Cardiovascular:  Negative for chest pain and palpitations.   Gastrointestinal:  Negative for abdominal pain and fecal incontinence.   Genitourinary:  Negative for bladder incontinence and difficulty urinating.   Musculoskeletal:  Positive for arthralgias, back pain, gait problem, joint swelling and leg pain.   Integumentary:  Negative for rash.    Neurological:  Negative for dizziness and headaches.      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    BMI 32.0-32.9,adult     Bronchitis     CHF (congestive heart failure)     Cobalamin deficiency     Constipation 5/22/2023    Contusion of left knee 8/12/2022    Contusion of right knee 8/12/2022    Deep vein thrombosis     Depressive disorder     Diarrhea 12/7/2022    Fall 8/12/2022    General anesthetics causing adverse effect in therapeutic use     GERD (gastroesophageal reflux disease)     Heart attack     20 yrs ago    Hypertension     Hypothyroidism     Lower extremity edema     Neuropathy     Osteoarthritis of both knees     Pain in left leg     Pain in right leg     Peripheral edema     Pulmonary embolism 2017    Stroke     Varicose veins of bilateral lower extremities with pain     Vitamin D deficiency        Past Surgical History:   Procedure Laterality Date    BILATERAL TUBAL LIGATION      CARDIAC CATHETERIZATION      COLONOSCOPY      EYE SURGERY      knee scope Left     THYROIDECTOMY, PARTIAL      TONSILLECTOMY, ADENOIDECTOMY      TOTAL KNEE ARTHROPLASTY Bilateral     TOTAL KNEE ARTHROPLASTY Right     TUBAL LIGATION      VAGINAL DELIVERY      x 3       Social History  Victoria Delarosa  reports that she has never smoked. She has been exposed to tobacco smoke. She has never used smokeless tobacco. She reports that she does not drink alcohol and does not use drugs.    Family History  Victoria Delarosa  family history includes Arthritis in her brother and sister; Heart disease in her brother; Stroke in her brother.    Medications and Allergies     Medications  Outpatient Medications Marked as Taking for the 1/25/24 encounter (Office Visit) with Tim Burgos MD   Medication Sig Dispense Refill     calcium-vitamin D 600 mg-10 mcg (400 unit) Tab Take 1 tablet by mouth 2 (two) times a day. 180 tablet 3    cyanocobalamin 1,000 mcg/mL injection INJECT 1 ML EVERY MONTH 1 mL 11    ezetimibe (ZETIA) 10 mg tablet Take 10 mg by mouth once daily.      furosemide (LASIX) 20 MG tablet Take 1 tablet (20 mg total) by mouth once daily. 90 tablet 1    levothyroxine (SYNTHROID) 25 MCG tablet Take 1 tablet (25 mcg total) by mouth once daily. 90 tablet 1    losartan (COZAAR) 50 MG tablet Take 1 tablet (50 mg total) by mouth once daily. 90 tablet 1    magnesium oxide (MAG-OX) 400 mg (241.3 mg magnesium) tablet Take 1 tablet by mouth once daily.      oxybutynin (DITROPAN) 5 MG Tab Take 1 tablet by mouth 2 (two) times daily.      potassium chloride (KLOR-CON) 10 MEQ TbSR Take 1 tablet (10 mEq total) by mouth once daily. 90 tablet 1    ranolazine (RANEXA) 500 MG Tb12 Take 500 mg by mouth 2 (two) times daily.      sertraline (ZOLOFT) 100 MG tablet TAKE 1 TABLET (100 MG TOTAL) BY MOUTH ONCE DAILY. 30 tablet 0    spironolactone (ALDACTONE) 25 MG tablet Take 1 tablet (25 mg total) by mouth once daily. 30 tablet 0    warfarin (COUMADIN) 5 MG tablet Take 5 mg by mouth Daily.         Allergies  Review of patient's allergies indicates:   Allergen Reactions    Lisinopril      Cough      Statins-hmg-coa reductase inhibitors Hives and Itching       Physical Examination     Vitals:    01/25/24 1033   BP: 102/70   Pulse: 76   Temp: 98 °F (36.7 °C)     Physical Exam  Constitutional:       Appearance: Normal appearance.   HENT:      Head: Normocephalic and atraumatic.      Right Ear: Tympanic membrane normal.      Left Ear: Tympanic membrane normal.      Nose: Congestion and rhinorrhea present.      Mouth/Throat:      Pharynx: Posterior oropharyngeal erythema present.   Eyes:      Pupils: Pupils are equal, round, and reactive to light.   Cardiovascular:      Rate and Rhythm: Normal rate and regular rhythm.      Pulses: Normal pulses.       Heart sounds: Normal heart sounds.   Pulmonary:      Breath sounds: No wheezing or rhonchi.   Abdominal:      Palpations: Abdomen is soft.   Lymphadenopathy:      Cervical: Cervical adenopathy present.   Skin:     General: Skin is warm and dry.   Neurological:      Mental Status: She is alert.        Assessment and Plan (including Health Maintenance)   :    Plan:         Health Maintenance Due   Topic Date Due    TETANUS VACCINE  Never done    Hemoglobin A1c (Diabetic Prevention Screening)  Never done    Shingles Vaccine (1 of 2) Never done    RSV Vaccine (Age 60+ and Pregnant patients) (1 - 1-dose 60+ series) Never done    Influenza Vaccine (1) 09/01/2023    COVID-19 Vaccine (4 - 2023-24 season) 09/01/2023    Mammogram  03/29/2024       Problem List Items Addressed This Visit    None    There are no diagnoses linked to this encounter.   Health Maintenance Topics with due status: Not Due       Topic Last Completion Date    Pneumococcal Vaccines (Age 65+) 06/04/2019    Colorectal Cancer Screening 11/28/2022    Lipid Panel 07/17/2023    DEXA Scan 08/10/2023       Procedures     Future Appointments   Date Time Provider Department Center   [facility]        No follow-ups on file.       Signature:  Tim Burgos MD  [facility]  [address] [place] [address]  [phone] Phone  [phone] Fax    Date of encounter: 1/25/24

## 2024-02-14 ENCOUNTER — PATIENT OUTREACH (OUTPATIENT)
Dept: ADMINISTRATIVE | Facility: HOSPITAL | Age: 69
End: 2024-02-14

## 2024-02-21 ENCOUNTER — OFFICE VISIT (OUTPATIENT)
Dept: FAMILY MEDICINE | Facility: CLINIC | Age: 69
End: 2024-02-21
Payer: MEDICARE

## 2024-02-21 ENCOUNTER — APPOINTMENT (OUTPATIENT)
Dept: RADIOLOGY | Facility: CLINIC | Age: 69
End: 2024-02-21
Attending: FAMILY MEDICINE
Payer: MEDICARE

## 2024-02-21 VITALS
HEIGHT: 67 IN | OXYGEN SATURATION: 97 % | TEMPERATURE: 98 F | SYSTOLIC BLOOD PRESSURE: 126 MMHG | WEIGHT: 207.25 LBS | DIASTOLIC BLOOD PRESSURE: 84 MMHG | HEART RATE: 72 BPM | BODY MASS INDEX: 32.53 KG/M2

## 2024-02-21 DIAGNOSIS — R06.02 SHORTNESS OF BREATH: ICD-10-CM

## 2024-02-21 DIAGNOSIS — R05.9 COUGH, UNSPECIFIED TYPE: ICD-10-CM

## 2024-02-21 DIAGNOSIS — E03.9 HYPOTHYROIDISM, UNSPECIFIED TYPE: ICD-10-CM

## 2024-02-21 DIAGNOSIS — I10 HYPERTENSION, UNSPECIFIED TYPE: Primary | ICD-10-CM

## 2024-02-21 DIAGNOSIS — J01.00 ACUTE NON-RECURRENT MAXILLARY SINUSITIS: ICD-10-CM

## 2024-02-21 LAB
ALBUMIN SERPL BCP-MCNC: 3.6 G/DL (ref 3.5–5)
ALBUMIN/GLOB SERPL: 1.1 {RATIO}
ALP SERPL-CCNC: 98 U/L (ref 55–142)
ALT SERPL W P-5'-P-CCNC: 22 U/L (ref 13–56)
ANION GAP SERPL CALCULATED.3IONS-SCNC: 11 MMOL/L (ref 7–16)
AST SERPL W P-5'-P-CCNC: 27 U/L (ref 15–37)
BASOPHILS # BLD AUTO: 0.04 K/UL (ref 0–0.2)
BASOPHILS NFR BLD AUTO: 0.7 % (ref 0–1)
BILIRUB SERPL-MCNC: 0.7 MG/DL (ref ?–1.2)
BUN SERPL-MCNC: 19 MG/DL (ref 7–18)
BUN/CREAT SERPL: 15 (ref 6–20)
CALCIUM SERPL-MCNC: 9.3 MG/DL (ref 8.5–10.1)
CHLORIDE SERPL-SCNC: 103 MMOL/L (ref 98–107)
CHOLEST SERPL-MCNC: 191 MG/DL (ref 0–200)
CHOLEST/HDLC SERPL: 5.5 {RATIO}
CO2 SERPL-SCNC: 29 MMOL/L (ref 21–32)
CREAT SERPL-MCNC: 1.25 MG/DL (ref 0.55–1.02)
DIFFERENTIAL METHOD BLD: ABNORMAL
EGFR (NO RACE VARIABLE) (RUSH/TITUS): 46 ML/MIN/1.73M2
EOSINOPHIL # BLD AUTO: 0.36 K/UL (ref 0–0.5)
EOSINOPHIL NFR BLD AUTO: 5.9 % (ref 1–4)
ERYTHROCYTE [DISTWIDTH] IN BLOOD BY AUTOMATED COUNT: 12.9 % (ref 11.5–14.5)
GLOBULIN SER-MCNC: 3.4 G/DL (ref 2–4)
GLUCOSE SERPL-MCNC: 103 MG/DL (ref 74–106)
HCT VFR BLD AUTO: 42.5 % (ref 38–47)
HDLC SERPL-MCNC: 35 MG/DL (ref 40–60)
HGB BLD-MCNC: 15.1 G/DL (ref 12–16)
IMM GRANULOCYTES # BLD AUTO: 0.01 K/UL (ref 0–0.04)
IMM GRANULOCYTES NFR BLD: 0.2 % (ref 0–0.4)
LDLC SERPL CALC-MCNC: 118 MG/DL
LDLC/HDLC SERPL: 3.4 {RATIO}
LYMPHOCYTES # BLD AUTO: 2.17 K/UL (ref 1–4.8)
LYMPHOCYTES NFR BLD AUTO: 35.3 % (ref 27–41)
MCH RBC QN AUTO: 32.3 PG (ref 27–31)
MCHC RBC AUTO-ENTMCNC: 35.5 G/DL (ref 32–36)
MCV RBC AUTO: 91 FL (ref 80–96)
MONOCYTES # BLD AUTO: 0.64 K/UL (ref 0–0.8)
MONOCYTES NFR BLD AUTO: 10.4 % (ref 2–6)
MPC BLD CALC-MCNC: 9.9 FL (ref 9.4–12.4)
NEUTROPHILS # BLD AUTO: 2.92 K/UL (ref 1.8–7.7)
NEUTROPHILS NFR BLD AUTO: 47.5 % (ref 53–65)
NONHDLC SERPL-MCNC: 156 MG/DL
NRBC # BLD AUTO: 0 X10E3/UL
NRBC, AUTO (.00): 0 %
PLATELET # BLD AUTO: 189 K/UL (ref 150–400)
POTASSIUM SERPL-SCNC: 4.2 MMOL/L (ref 3.5–5.1)
PROT SERPL-MCNC: 7 G/DL (ref 6.4–8.2)
RBC # BLD AUTO: 4.67 M/UL (ref 4.2–5.4)
SODIUM SERPL-SCNC: 139 MMOL/L (ref 136–145)
T4 FREE SERPL-MCNC: 1.18 NG/DL (ref 0.76–1.46)
TRIGL SERPL-MCNC: 191 MG/DL (ref 35–150)
TSH SERPL DL<=0.005 MIU/L-ACNC: 2.21 UIU/ML (ref 0.36–3.74)
VLDLC SERPL-MCNC: 38 MG/DL
WBC # BLD AUTO: 6.14 K/UL (ref 4.5–11)

## 2024-02-21 PROCEDURE — 84443 ASSAY THYROID STIM HORMONE: CPT | Mod: ,,, | Performed by: CLINICAL MEDICAL LABORATORY

## 2024-02-21 PROCEDURE — 80053 COMPREHEN METABOLIC PANEL: CPT | Mod: ,,, | Performed by: CLINICAL MEDICAL LABORATORY

## 2024-02-21 PROCEDURE — 71046 X-RAY EXAM CHEST 2 VIEWS: CPT | Mod: 26,,, | Performed by: RADIOLOGY

## 2024-02-21 PROCEDURE — 85025 COMPLETE CBC W/AUTO DIFF WBC: CPT | Mod: ,,, | Performed by: CLINICAL MEDICAL LABORATORY

## 2024-02-21 PROCEDURE — 96372 THER/PROPH/DIAG INJ SC/IM: CPT | Mod: ,,, | Performed by: FAMILY MEDICINE

## 2024-02-21 PROCEDURE — 84439 ASSAY OF FREE THYROXINE: CPT | Mod: ,,, | Performed by: CLINICAL MEDICAL LABORATORY

## 2024-02-21 PROCEDURE — 80061 LIPID PANEL: CPT | Mod: ,,, | Performed by: CLINICAL MEDICAL LABORATORY

## 2024-02-21 PROCEDURE — 99214 OFFICE O/P EST MOD 30 MIN: CPT | Mod: ,,, | Performed by: FAMILY MEDICINE

## 2024-02-21 PROCEDURE — 71046 X-RAY EXAM CHEST 2 VIEWS: CPT | Mod: TC,RHCUB,FY | Performed by: FAMILY MEDICINE

## 2024-02-21 RX ORDER — METHYLPREDNISOLONE ACETATE 80 MG/ML
40 INJECTION, SUSPENSION INTRA-ARTICULAR; INTRALESIONAL; INTRAMUSCULAR; SOFT TISSUE
Status: COMPLETED | OUTPATIENT
Start: 2024-02-21 | End: 2024-02-21

## 2024-02-21 RX ORDER — POTASSIUM CHLORIDE 750 MG/1
10 TABLET, EXTENDED RELEASE ORAL DAILY
Qty: 90 TABLET | Refills: 1 | Status: SHIPPED | OUTPATIENT
Start: 2024-02-21

## 2024-02-21 RX ORDER — SERTRALINE HYDROCHLORIDE 100 MG/1
100 TABLET, FILM COATED ORAL DAILY
Qty: 90 TABLET | Refills: 1 | Status: SHIPPED | OUTPATIENT
Start: 2024-02-21

## 2024-02-21 RX ORDER — LOSARTAN POTASSIUM 50 MG/1
50 TABLET ORAL DAILY
Qty: 90 TABLET | Refills: 1 | Status: SHIPPED | OUTPATIENT
Start: 2024-02-21

## 2024-02-21 RX ORDER — CETIRIZINE HYDROCHLORIDE 10 MG/1
10 TABLET ORAL DAILY
COMMUNITY

## 2024-02-21 RX ORDER — FUROSEMIDE 20 MG/1
20 TABLET ORAL DAILY
Qty: 90 TABLET | Refills: 1 | Status: SHIPPED | OUTPATIENT
Start: 2024-02-21

## 2024-02-21 RX ORDER — DEXAMETHASONE SODIUM PHOSPHATE 4 MG/ML
4 INJECTION, SOLUTION INTRA-ARTICULAR; INTRALESIONAL; INTRAMUSCULAR; INTRAVENOUS; SOFT TISSUE
Status: COMPLETED | OUTPATIENT
Start: 2024-02-21 | End: 2024-02-21

## 2024-02-21 RX ORDER — LEVOTHYROXINE SODIUM 25 UG/1
25 TABLET ORAL DAILY
Qty: 90 TABLET | Refills: 1 | Status: SHIPPED | OUTPATIENT
Start: 2024-02-21

## 2024-02-21 RX ORDER — CEFTRIAXONE 1 G/1
1 INJECTION, POWDER, FOR SOLUTION INTRAMUSCULAR; INTRAVENOUS
Status: COMPLETED | OUTPATIENT
Start: 2024-02-21 | End: 2024-02-21

## 2024-02-21 RX ORDER — SPIRONOLACTONE 25 MG/1
25 TABLET ORAL DAILY
Qty: 90 TABLET | Refills: 1 | Status: SHIPPED | OUTPATIENT
Start: 2024-02-21

## 2024-02-21 RX ORDER — AMOXICILLIN 500 MG/1
500 TABLET, FILM COATED ORAL 3 TIMES DAILY
Qty: 42 TABLET | Refills: 0 | Status: SHIPPED | OUTPATIENT
Start: 2024-02-21 | End: 2024-03-06

## 2024-02-21 RX ADMIN — METHYLPREDNISOLONE ACETATE 40 MG: 80 INJECTION, SUSPENSION INTRA-ARTICULAR; INTRALESIONAL; INTRAMUSCULAR; SOFT TISSUE at 11:02

## 2024-02-21 RX ADMIN — DEXAMETHASONE SODIUM PHOSPHATE 4 MG: 4 INJECTION, SOLUTION INTRA-ARTICULAR; INTRALESIONAL; INTRAMUSCULAR; INTRAVENOUS; SOFT TISSUE at 11:02

## 2024-02-21 RX ADMIN — CEFTRIAXONE 1 G: 1 INJECTION, POWDER, FOR SOLUTION INTRAMUSCULAR; INTRAVENOUS at 11:02

## 2024-02-21 NOTE — PROGRESS NOTES
Tim Burgos MD   [facility]  [address] [place] [address]     PATIENT NAME: Victoria Delarosa  : 1955  DATE: 24  MRN: [id number]      Billing Provider: Tmi Burgos MD  Level of Service: [place] OFFICE/OUTPT VISIT, EST, LEVL IV, 30-39 MIN  Patient PCP Information       Provider PCP Type    Tim Burgos MD General            Reason for Visit / Chief Complaint: Hypertension (Check up, labs and med refills), Hypothyroidism, and Cough (C/o cough, shortness of breath and rattling in chest since LOV 24)         History of Present Illness / Problem Focused Workflow     Victoria Delarosa presents to the clinic with Hypertension (Check up, labs and med refills), Hypothyroidism, and Cough (C/o cough, shortness of breath and rattling in chest since LOV 24)     HPI    Review of Systems     Review of Systems   Constitutional:  Negative for activity change, appetite change, fatigue and fever.   HENT:  Negative for nasal congestion, ear pain, hearing loss, sinus pressure/congestion and sore throat.    Respiratory:  Positive for cough and shortness of breath. Negative for chest tightness.    Cardiovascular:  Negative for chest pain and palpitations.   Gastrointestinal:  Negative for abdominal pain and fecal incontinence.   Genitourinary:  Negative for bladder incontinence and difficulty urinating.   Musculoskeletal:  Negative for arthralgias.   Integumentary:  Negative for rash.   Neurological:  Negative for dizziness and headaches.        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    BMI 32.0-32.9,adult     Bronchitis     CHF (congestive heart failure)     Cobalamin deficiency     Constipation 2023    Contusion of left knee 2022    Contusion of right knee 2022    Deep vein thrombosis      Depressive disorder     Diarrhea 12/7/2022    Fall 8/12/2022    General anesthetics causing adverse effect in therapeutic use     GERD (gastroesophageal reflux disease)     Heart attack     20 yrs ago    Hypertension     Hypothyroidism     Lower extremity edema     Neuropathy     Osteoarthritis of both knees     Pain in left leg     Pain in right leg     Peripheral edema     Pulmonary embolism 2017    Stroke     Varicose veins of bilateral lower extremities with pain     Vitamin D deficiency        Past Surgical History:   Procedure Laterality Date    BILATERAL TUBAL LIGATION      CARDIAC CATHETERIZATION      COLONOSCOPY      EYE SURGERY      knee scope Left     THYROIDECTOMY, PARTIAL      TONSILLECTOMY, ADENOIDECTOMY      TOTAL KNEE ARTHROPLASTY Bilateral     TOTAL KNEE ARTHROPLASTY Right     TUBAL LIGATION      VAGINAL DELIVERY      x 3       Social History  Victoria Delarosa  reports that she has never smoked. She has been exposed to tobacco smoke. She has never used smokeless tobacco. She reports that she does not drink alcohol and does not use drugs.    Family History  Victoria Delarosa  family history includes Arthritis in her brother and sister; Heart disease in her brother; Stroke in her brother.    Medications and Allergies     Medications  Outpatient Medications Marked as Taking for the 2/21/24 encounter (Office Visit) with Tim Burgos MD   Medication Sig Dispense Refill    ezetimibe (ZETIA) 10 mg tablet Take 10 mg by mouth once daily.      magnesium oxide (MAG-OX) 400 mg (241.3 mg magnesium) tablet Take 1 tablet by mouth once daily.      oxybutynin (DITROPAN) 5 MG Tab Take 1 tablet by mouth 2 (two) times daily.      ranolazine (RANEXA) 500 MG Tb12 Take 500 mg by mouth 2 (two) times daily.      warfarin (COUMADIN) 5 MG tablet Take 5 mg by mouth Daily.      [DISCONTINUED] furosemide (LASIX) 20 MG tablet Take 1 tablet (20 mg total) by mouth once daily. 90 tablet 1    [DISCONTINUED] levothyroxine  (SYNTHROID) 25 MCG tablet Take 1 tablet (25 mcg total) by mouth once daily. 90 tablet 1    [DISCONTINUED] losartan (COZAAR) 50 MG tablet Take 1 tablet (50 mg total) by mouth once daily. 90 tablet 1    [DISCONTINUED] potassium chloride (KLOR-CON) 10 MEQ TbSR Take 1 tablet (10 mEq total) by mouth once daily. 90 tablet 1    [DISCONTINUED] sertraline (ZOLOFT) 100 MG tablet TAKE 1 TABLET (100 MG TOTAL) BY MOUTH ONCE DAILY. 30 tablet 0    [DISCONTINUED] spironolactone (ALDACTONE) 25 MG tablet Take 1 tablet (25 mg total) by mouth once daily. 30 tablet 0       Allergies  Review of patient's allergies indicates:   Allergen Reactions    Lisinopril      Cough      Statins-hmg-coa reductase inhibitors Hives and Itching       Physical Examination     Vitals:    02/21/24 1047   BP: 126/84   Pulse: 72   Temp: 97.8 °F (36.6 °C)     Physical Exam  Constitutional:       General: She is not in acute distress.     Appearance: She is not ill-appearing.   HENT:      Head: Normocephalic and atraumatic.      Right Ear: Tympanic membrane and ear canal normal.      Left Ear: Tympanic membrane and ear canal normal.      Nose: Nose normal. No congestion or rhinorrhea.   Eyes:      Pupils: Pupils are equal, round, and reactive to light.   Cardiovascular:      Rate and Rhythm: Normal rate and regular rhythm.      Pulses: Normal pulses.      Heart sounds: No murmur heard.  Pulmonary:      Effort: No respiratory distress.      Breath sounds: No wheezing, rhonchi or rales.   Abdominal:      General: Bowel sounds are normal.      Palpations: Abdomen is soft.      Tenderness: There is no abdominal tenderness.      Hernia: No hernia is present.   Musculoskeletal:      Cervical back: Normal range of motion and neck supple.   Lymphadenopathy:      Cervical: No cervical adenopathy.   Skin:     General: Skin is warm and dry.   Neurological:      Mental Status: She is alert.   Psychiatric:         Behavior: Behavior normal.         Thought Content: Thought  content normal.          Assessment and Plan (including Health Maintenance)   :    Plan:         Health Maintenance Due   Topic Date Due    TETANUS VACCINE  Never done    Hemoglobin A1c (Diabetic Prevention Screening)  Never done    RSV Vaccine (Age 60+ and Pregnant patients) (1 - 1-dose 60+ series) Never done    COVID-19 Vaccine (4 - 2023-24 season) 09/01/2023    Mammogram  03/29/2024       Problem List Items Addressed This Visit          Cardiac/Vascular    Hypertension - Primary    Relevant Orders    CBC Auto Differential (Completed)    Comprehensive Metabolic Panel (Completed)    Lipid Panel (Completed)       Endocrine    Hypothyroidism    Relevant Orders    CBC Auto Differential (Completed)    Comprehensive Metabolic Panel (Completed)    TSH (Completed)    T4, Free (Completed)     Other Visit Diagnoses       Cough, unspecified type        Relevant Medications    amoxicillin (AMOXIL) 500 MG Tab    dexAMETHasone injection 4 mg (Completed)    methylPREDNISolone acetate injection 40 mg (Completed)    cefTRIAXone injection 1 g (Completed)    Other Relevant Orders    X-Ray Chest PA And Lateral (Completed)    Shortness of breath        Relevant Orders    X-Ray Chest PA And Lateral (Completed)    Acute non-recurrent maxillary sinusitis        Relevant Medications    amoxicillin (AMOXIL) 500 MG Tab    dexAMETHasone injection 4 mg (Completed)    methylPREDNISolone acetate injection 40 mg (Completed)    cefTRIAXone injection 1 g (Completed)          Hypertension, unspecified type  -     CBC Auto Differential; Future; Expected date: 02/21/2024  -     Comprehensive Metabolic Panel; Future; Expected date: 02/21/2024  -     Lipid Panel; Future; Expected date: 02/21/2024    Hypothyroidism, unspecified type  -     CBC Auto Differential; Future; Expected date: 02/21/2024  -     Comprehensive Metabolic Panel; Future; Expected date: 02/21/2024  -     TSH; Future; Expected date: 02/21/2024  -     T4, Free; Future; Expected date:  02/21/2024    Cough, unspecified type  -     X-Ray Chest PA And Lateral; Future; Expected date: 02/21/2024  -     amoxicillin (AMOXIL) 500 MG Tab; Take 1 tablet (500 mg total) by mouth 3 (three) times daily. for 14 days  Dispense: 42 tablet; Refill: 0  -     dexAMETHasone injection 4 mg  -     methylPREDNISolone acetate injection 40 mg  -     cefTRIAXone injection 1 g    Shortness of breath  -     X-Ray Chest PA And Lateral; Future; Expected date: 02/21/2024    Acute non-recurrent maxillary sinusitis  -     amoxicillin (AMOXIL) 500 MG Tab; Take 1 tablet (500 mg total) by mouth 3 (three) times daily. for 14 days  Dispense: 42 tablet; Refill: 0  -     dexAMETHasone injection 4 mg  -     methylPREDNISolone acetate injection 40 mg  -     cefTRIAXone injection 1 g    Other orders  -     furosemide (LASIX) 20 MG tablet; Take 1 tablet (20 mg total) by mouth once daily.  Dispense: 90 tablet; Refill: 1  -     levothyroxine (SYNTHROID) 25 MCG tablet; Take 1 tablet (25 mcg total) by mouth once daily.  Dispense: 90 tablet; Refill: 1  -     losartan (COZAAR) 50 MG tablet; Take 1 tablet (50 mg total) by mouth once daily.  Dispense: 90 tablet; Refill: 1  -     potassium chloride (KLOR-CON) 10 MEQ TbSR; Take 1 tablet (10 mEq total) by mouth once daily.  Dispense: 90 tablet; Refill: 1  -     sertraline (ZOLOFT) 100 MG tablet; Take 1 tablet (100 mg total) by mouth once daily.  Dispense: 90 tablet; Refill: 1  -     spironolactone (ALDACTONE) 25 MG tablet; Take 1 tablet (25 mg total) by mouth once daily.  Dispense: 90 tablet; Refill: 1       Health Maintenance Topics with due status: Not Due       Topic Last Completion Date    Pneumococcal Vaccines (Age 65+) 06/04/2019    Colorectal Cancer Screening 11/28/2022    DEXA Scan 08/10/2023    Shingles Vaccine 01/10/2024    Lipid Panel 02/21/2024       Procedures     Future Appointments   Date Time Provider Department Center   6/3/2024 11:20 AM [facility]         No follow-ups on file.       Signature:  Tim Burgos MD  [facility]  [address] [place] [address]  [phone] Phone  [phone] Fax    Date of encounter: 2/21/24

## 2024-03-08 ENCOUNTER — HOSPITAL ENCOUNTER (OUTPATIENT)
Dept: RADIOLOGY | Facility: HOSPITAL | Age: 69
Discharge: HOME | End: 2024-03-08
Attending: NURSE PRACTITIONER
Payer: MEDICARE

## 2024-03-08 DIAGNOSIS — M25.559 HIP PAIN: ICD-10-CM

## 2024-03-08 DIAGNOSIS — M54.6 THORACIC SPINE PAIN: ICD-10-CM

## 2024-03-08 PROCEDURE — 73522 X-RAY EXAM HIPS BI 3-4 VIEWS: CPT | Mod: TC

## 2024-03-08 PROCEDURE — 72070 X-RAY EXAM THORAC SPINE 2VWS: CPT | Mod: TC

## 2024-03-27 ENCOUNTER — TELEPHONE (OUTPATIENT)
Dept: FAMILY MEDICINE | Facility: CLINIC | Age: 69
End: 2024-03-27
Payer: MEDICARE

## 2024-03-27 NOTE — TELEPHONE ENCOUNTER
----- Message from Jacqui Sawyer sent at 3/27/2024 12:14 PM CDT -----  Please call her about her granddaughter, [phone]

## 2024-04-24 ENCOUNTER — HOSPITAL ENCOUNTER (OUTPATIENT)
Dept: RADIOLOGY | Facility: HOSPITAL | Age: 69
Discharge: HOME | End: 2024-04-24
Attending: NURSE PRACTITIONER
Payer: MEDICARE

## 2024-04-24 DIAGNOSIS — M25.569 PAIN IN UNSPECIFIED KNEE: ICD-10-CM

## 2024-04-24 PROCEDURE — 73560 X-RAY EXAM OF KNEE 1 OR 2: CPT | Mod: TC,50

## 2024-05-06 ENCOUNTER — OFFICE VISIT (OUTPATIENT)
Dept: FAMILY MEDICINE | Facility: CLINIC | Age: 69
End: 2024-05-06
Payer: MEDICARE

## 2024-05-06 ENCOUNTER — APPOINTMENT (OUTPATIENT)
Dept: RADIOLOGY | Facility: CLINIC | Age: 69
End: 2024-05-06
Attending: FAMILY MEDICINE
Payer: MEDICARE

## 2024-05-06 VITALS
SYSTOLIC BLOOD PRESSURE: 110 MMHG | WEIGHT: 207 LBS | BODY MASS INDEX: 32.49 KG/M2 | HEIGHT: 67 IN | OXYGEN SATURATION: 98 % | TEMPERATURE: 98 F | HEART RATE: 64 BPM | DIASTOLIC BLOOD PRESSURE: 80 MMHG
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
## 2024-05-06 DIAGNOSIS — M79.642 LEFT HAND PAIN: ICD-10-CM

## 2024-05-06 DIAGNOSIS — M79.642 LEFT HAND PAIN: Primary | ICD-10-CM

## 2024-05-06 PROCEDURE — 73130 X-RAY EXAM OF HAND: CPT | Mod: TC,RHCUB,FY,LT | Performed by: FAMILY MEDICINE

## 2024-05-06 PROCEDURE — 99213 OFFICE O/P EST LOW 20 MIN: CPT | Mod: ,,, | Performed by: FAMILY MEDICINE

## 2024-05-06 PROCEDURE — 73130 X-RAY EXAM OF HAND: CPT | Mod: 26,LT,, | Performed by: RADIOLOGY

## 2024-05-06 RX ORDER — GABAPENTIN 100 MG/1
100 CAPSULE ORAL NIGHTLY
COMMUNITY
Start: 2024-04-11

## 2024-05-06 NOTE — PROGRESS NOTES
Tim Burgos MD   [facility]  [address] [place] [address]     PATIENT NAME: Victoria Delarosa  : 1955  DATE: 24  MRN: [id number]      Billing Provider: Tim Burgos MD  Level of Service: [place] OFFICE/OUTPT VISIT, EST, LEVL III, 20-29 MIN  Patient PCP Information       Provider PCP Type    Tim Burgos MD General            Reason for Visit / Chief Complaint: Hand Pain (Fracture to left hand. Fell on Friday while she was in [place]. Seen at an urgent care and was put in a splint. Told she should see Ortho on Monday.)         History of Present Illness / Problem Focused Workflow     Victoria Delarosa presents to the clinic with Hand Pain (Fracture to left hand. Fell on Friday while she was in [place]. Seen at an urgent care and was put in a splint. Told she should see Ortho on Monday.)     HPI    Review of Systems     Review of Systems   Constitutional:  Negative for activity change, appetite change, fatigue and fever.   HENT:  Negative for nasal congestion, ear pain, hearing loss, sinus pressure/congestion and sore throat.    Respiratory:  Negative for cough, chest tightness and shortness of breath.    Cardiovascular:  Negative for chest pain and palpitations.   Gastrointestinal:  Negative for abdominal pain and fecal incontinence.   Genitourinary:  Negative for bladder incontinence and difficulty urinating.   Musculoskeletal:  Positive for joint swelling. Negative for arthralgias.   Integumentary:  Negative for rash.   Neurological:  Negative for dizziness and headaches.        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    BMI 32.0-32.9,adult     Bronchitis     CHF (congestive heart failure)     Cobalamin deficiency     Constipation 2023    Contusion of left knee 2022    Contusion of  right knee 8/12/2022    Deep vein thrombosis     Depressive disorder     Diarrhea 12/7/2022    Fall 8/12/2022    General anesthetics causing adverse effect in therapeutic use     GERD (gastroesophageal reflux disease)     Heart attack     20 yrs ago    Hypertension     Hypothyroidism     Lower extremity edema     Neuropathy     Osteoarthritis of both knees     Pain in left leg     Pain in right leg     Peripheral edema     Pulmonary embolism 2017    Stroke     Varicose veins of bilateral lower extremities with pain     Vitamin D deficiency        Past Surgical History:   Procedure Laterality Date    BILATERAL TUBAL LIGATION      CARDIAC CATHETERIZATION      COLONOSCOPY      EYE SURGERY      knee scope Left     THYROIDECTOMY, PARTIAL      TONSILLECTOMY, ADENOIDECTOMY      TOTAL KNEE ARTHROPLASTY Bilateral     TOTAL KNEE ARTHROPLASTY Right     TUBAL LIGATION      VAGINAL DELIVERY      x 3       Social History  Victoria Delarosa  reports that she has never smoked. She has been exposed to tobacco smoke. She has never used smokeless tobacco. She reports that she does not drink alcohol and does not use drugs.    Family History  Victoria Delarosa  family history includes Arthritis in her brother and sister; Heart disease in her brother; Stroke in her brother.    Medications and Allergies     Medications  Outpatient Medications Marked as Taking for the 5/6/24 encounter (Office Visit) with Tim Burgos MD   Medication Sig Dispense Refill    calcium-vitamin D 600 mg-10 mcg (400 unit) Tab Take 1 tablet by mouth 2 (two) times a day. 180 tablet 3    cefUROXime (CEFTIN) 500 MG tablet Take 1 tablet (500 mg total) by mouth 2 (two) times daily. 14 tablet 0    cetirizine (ZYRTEC) 10 MG tablet Take 10 mg by mouth once daily.      cyanocobalamin 1,000 mcg/mL injection INJECT 1 ML EVERY MONTH 1 mL 11    ezetimibe (ZETIA) 10 mg tablet Take 10 mg by mouth once daily.      furosemide (LASIX) 20 MG tablet Take 1 tablet (20 mg total) by  mouth once daily. 90 tablet 1    gabapentin (NEURONTIN) 100 MG capsule Take 100 mg by mouth every evening.      ketorolac 0.5% (ACULAR) 0.5 % Drop       levothyroxine (SYNTHROID) 25 MCG tablet Take 1 tablet (25 mcg total) by mouth once daily. 90 tablet 1    losartan (COZAAR) 50 MG tablet Take 1 tablet (50 mg total) by mouth once daily. 90 tablet 1    magnesium oxide (MAG-OX) 400 mg (241.3 mg magnesium) tablet Take 1 tablet by mouth once daily.      moxifloxacin (VIGAMOX) 0.5 % ophthalmic solution       oxybutynin (DITROPAN) 5 MG Tab Take 1 tablet by mouth 2 (two) times daily.      oxybutynin (DITROPAN) 5 MG Tab TAKE 1 TABLET (5 MG TOTAL) BY MOUTH 2 (TWO) TIMES DAILY. 180 tablet 2    oxybutynin (DITROPAN) 5 MG Tab Take 1 tablet (5 mg total) by mouth 2 (two) times daily. 90 tablet 1    potassium chloride (KLOR-CON) 10 MEQ TbSR Take 1 tablet (10 mEq total) by mouth once daily. 90 tablet 1    prednisoLONE acetate (PRED FORTE) 1 % DrpS Place 1 drop into the left eye once daily.      ranolazine (RANEXA) 500 MG Tb12 Take 500 mg by mouth 2 (two) times daily.      sertraline (ZOLOFT) 100 MG tablet Take 1 tablet (100 mg total) by mouth once daily. 90 tablet 1    spironolactone (ALDACTONE) 25 MG tablet Take 1 tablet (25 mg total) by mouth once daily. 90 tablet 1    warfarin (COUMADIN) 5 MG tablet Take 5 mg by mouth Daily.         Allergies  Review of patient's allergies indicates:   Allergen Reactions    Lisinopril      Cough      Statins-hmg-coa reductase inhibitors Hives and Itching       Physical Examination     Vitals:    05/06/24 1044   BP: 110/80   Pulse: 64   Temp: 98 °F (36.7 °C)     Physical Exam  Constitutional:       General: She is not in acute distress.     Appearance: She is not ill-appearing.   HENT:      Head: Normocephalic and atraumatic.      Right Ear: Tympanic membrane and ear canal normal.      Left Ear: Tympanic membrane and ear canal normal.      Nose: Nose normal. No congestion or rhinorrhea.   Eyes:       Pupils: Pupils are equal, round, and reactive to light.   Cardiovascular:      Rate and Rhythm: Normal rate and regular rhythm.      Pulses: Normal pulses.      Heart sounds: No murmur heard.  Pulmonary:      Effort: No respiratory distress.      Breath sounds: No wheezing, rhonchi or rales.   Abdominal:      General: Bowel sounds are normal.      Palpations: Abdomen is soft.      Tenderness: There is no abdominal tenderness.      Hernia: No hernia is present.   Musculoskeletal:         General: Tenderness (left 2nd through 5th finger with ROM or palpation) present.      Cervical back: Normal range of motion and neck supple.   Lymphadenopathy:      Cervical: No cervical adenopathy.   Skin:     General: Skin is warm and dry.   Neurological:      Mental Status: She is alert.   Psychiatric:         Behavior: Behavior normal.         Thought Content: Thought content normal.          Assessment and Plan (including Health Maintenance)   :    Plan:         Health Maintenance Due   Topic Date Due    TETANUS VACCINE  Never done    Hemoglobin A1c (Diabetic Prevention Screening)  Never done    RSV Vaccine (Age 60+ and Pregnant patients) (1 - 1-dose 60+ series) Never done    COVID-19 Vaccine (4 - 2023-24 season) 09/01/2023    Shingles Vaccine (2 of 2) 03/06/2024    Mammogram  03/29/2024    Pneumococcal Vaccines (Age 65+) (3 of 3 - PPSV23 or PCV20) 06/04/2024       Problem List Items Addressed This Visit    None  Visit Diagnoses       Left hand pain    -  Primary    Relevant Orders    X-Ray Hand 3 View Left (Completed)    Fall, initial encounter        Relevant Orders    X-Ray Hand 3 View Left (Completed)          Left hand pain  -     X-Ray Hand 3 View Left; Future; Expected date: 05/06/2024    Fall, initial encounter  -     X-Ray Hand 3 View Left; Future; Expected date: 05/06/2024       Health Maintenance Topics with due status: Not Due       Topic Last Completion Date    Colorectal Cancer Screening 11/28/2022    DEXA Scan  08/10/2023    Lipid Panel 02/21/2024       Procedures     Future Appointments   Date Time Provider Department Center   5/13/2024  9:20 AM Tim Burgos MD [facility]   6/3/2024 11:20 AM [facility] M[facility]        No follow-ups on file.       Signature:  Tim Burgos MD  [facility]  [address] [place] [address]  [phone] Phone  [phone] Fax    Date of encounter: 5/6/24

## 2024-05-29 ENCOUNTER — OFFICE VISIT (OUTPATIENT)
Dept: FAMILY MEDICINE | Facility: CLINIC | Age: 69
End: 2024-05-29
Payer: MEDICARE

## 2024-05-29 ENCOUNTER — APPOINTMENT (OUTPATIENT)
Dept: RADIOLOGY | Facility: CLINIC | Age: 69
End: 2024-05-29
Attending: FAMILY MEDICINE
Payer: MEDICARE

## 2024-05-29 VITALS
TEMPERATURE: 98 F | BODY MASS INDEX: 33.37 KG/M2 | DIASTOLIC BLOOD PRESSURE: 70 MMHG | WEIGHT: 212.63 LBS | OXYGEN SATURATION: 97 % | HEIGHT: 67 IN | SYSTOLIC BLOOD PRESSURE: 118 MMHG | HEART RATE: 74 BPM

## 2024-05-29 DIAGNOSIS — S62.347D CLOSED NONDISPLACED FRACTURE OF BASE OF FIFTH METACARPAL BONE OF LEFT HAND WITH ROUTINE HEALING, SUBSEQUENT ENCOUNTER: Primary | ICD-10-CM

## 2024-05-29 DIAGNOSIS — S62.347D CLOSED NONDISPLACED FRACTURE OF BASE OF FIFTH METACARPAL BONE OF LEFT HAND WITH ROUTINE HEALING, SUBSEQUENT ENCOUNTER: ICD-10-CM

## 2024-05-29 PROCEDURE — 99213 OFFICE O/P EST LOW 20 MIN: CPT | Mod: ,,, | Performed by: FAMILY MEDICINE

## 2024-05-29 PROCEDURE — 73130 X-RAY EXAM OF HAND: CPT | Mod: 26,LT,, | Performed by: RADIOLOGY

## 2024-05-29 PROCEDURE — 73130 X-RAY EXAM OF HAND: CPT | Mod: TC,RHCUB,FY,LT | Performed by: FAMILY MEDICINE

## 2024-05-29 NOTE — PROGRESS NOTES
Tim Burgos MD   [facility]  [address] [place] [address]     PATIENT NAME: Victoria Delarosa  : 1955  DATE: 24  MRN: [id number]      Billing Provider: Tim Burgos MD  Level of Service: [place] OFFICE/OUTPT VISIT, EST, LEVL III, 20-29 MIN  Patient PCP Information       Provider PCP Type    Tim Burgos MD General            Reason for Visit / Chief Complaint: Hand Pain (Recheck left hand )         History of Present Illness / Problem Focused Workflow     Victoria Delarosa presents to the clinic with Hand Pain (Recheck left hand )     HPI    Review of Systems     Review of Systems   Constitutional:  Negative for activity change, appetite change, fatigue and fever.   HENT:  Negative for nasal congestion, ear pain, hearing loss, sinus pressure/congestion and sore throat.    Respiratory:  Negative for cough, chest tightness and shortness of breath.    Cardiovascular:  Negative for chest pain and palpitations.   Gastrointestinal:  Negative for abdominal pain and fecal incontinence.   Genitourinary:  Negative for bladder incontinence and difficulty urinating.   Musculoskeletal:  Positive for joint swelling and joint deformity. Negative for arthralgias.   Integumentary:  Negative for rash.   Neurological:  Negative for dizziness and headaches.        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    BMI 32.0-32.9,adult     Bronchitis     CHF (congestive heart failure)     Cobalamin deficiency     Constipation 2023    Contusion of left knee 2022    Contusion of right knee 2022    Deep vein thrombosis     Depressive disorder     Diarrhea 2022    Fall 2022    General anesthetics causing adverse effect in therapeutic use     GERD (gastroesophageal reflux disease)     Heart attack     20 yrs  ago    Hypertension     Hypothyroidism     Lower extremity edema     Neuropathy     Osteoarthritis of both knees     Pain in left leg     Pain in right leg     Peripheral edema     Pulmonary embolism 2017    Stroke     Varicose veins of bilateral lower extremities with pain     Vitamin D deficiency        Past Surgical History:   Procedure Laterality Date    BILATERAL TUBAL LIGATION      CARDIAC CATHETERIZATION      COLONOSCOPY      EYE SURGERY      knee scope Left     THYROIDECTOMY, PARTIAL      TONSILLECTOMY, ADENOIDECTOMY      TOTAL KNEE ARTHROPLASTY Bilateral     TOTAL KNEE ARTHROPLASTY Right     TUBAL LIGATION      VAGINAL DELIVERY      x 3       Social History  Victoria Delarosa  reports that she has never smoked. She has been exposed to tobacco smoke. She has never used smokeless tobacco. She reports that she does not drink alcohol and does not use drugs.    Family History  Victoria Delarosa  family history includes Arthritis in her brother and sister; Heart disease in her brother; Stroke in her brother.    Medications and Allergies     Medications  Outpatient Medications Marked as Taking for the 5/29/24 encounter (Office Visit) with Tim Burgos MD   Medication Sig Dispense Refill    calcium-vitamin D 600 mg-10 mcg (400 unit) Tab Take 1 tablet by mouth 2 (two) times a day. 180 tablet 3    cetirizine (ZYRTEC) 10 MG tablet Take 10 mg by mouth once daily.      cyanocobalamin 1,000 mcg/mL injection INJECT 1 ML EVERY MONTH 1 mL 11    ezetimibe (ZETIA) 10 mg tablet Take 10 mg by mouth once daily.      furosemide (LASIX) 20 MG tablet Take 1 tablet (20 mg total) by mouth once daily. 90 tablet 1    gabapentin (NEURONTIN) 100 MG capsule Take 100 mg by mouth every evening.      levothyroxine (SYNTHROID) 25 MCG tablet Take 1 tablet (25 mcg total) by mouth once daily. 90 tablet 1    losartan (COZAAR) 50 MG tablet Take 1 tablet (50 mg total) by mouth once daily. 90 tablet 1    magnesium oxide (MAG-OX) 400 mg (241.3 mg  magnesium) tablet Take 1 tablet by mouth once daily.      oxybutynin (DITROPAN) 5 MG Tab Take 1 tablet by mouth 2 (two) times daily.      oxybutynin (DITROPAN) 5 MG Tab TAKE 1 TABLET (5 MG TOTAL) BY MOUTH 2 (TWO) TIMES DAILY. 180 tablet 2    oxybutynin (DITROPAN) 5 MG Tab Take 1 tablet (5 mg total) by mouth 2 (two) times daily. 90 tablet 1    potassium chloride (KLOR-CON) 10 MEQ TbSR Take 1 tablet (10 mEq total) by mouth once daily. 90 tablet 1    ranolazine (RANEXA) 500 MG Tb12 Take 500 mg by mouth 2 (two) times daily.         Allergies  Review of patient's allergies indicates:   Allergen Reactions    Lisinopril      Cough      Statins-hmg-coa reductase inhibitors Hives and Itching       Physical Examination     Vitals:    05/29/24 1012   BP: 118/70   Pulse: 74   Temp: 98.3 °F (36.8 °C)     Physical Exam  Constitutional:       General: She is not in acute distress.     Appearance: She is not ill-appearing.   HENT:      Head: Normocephalic and atraumatic.      Right Ear: Tympanic membrane and ear canal normal.      Left Ear: Tympanic membrane and ear canal normal.      Nose: Nose normal. No congestion or rhinorrhea.   Eyes:      Pupils: Pupils are equal, round, and reactive to light.   Cardiovascular:      Rate and Rhythm: Normal rate and regular rhythm.      Pulses: Normal pulses.      Heart sounds: No murmur heard.  Pulmonary:      Effort: No respiratory distress.      Breath sounds: No wheezing, rhonchi or rales.   Abdominal:      General: Bowel sounds are normal.      Palpations: Abdomen is soft.      Tenderness: There is no abdominal tenderness.      Hernia: No hernia is present.   Musculoskeletal:      Cervical back: Normal range of motion and neck supple.   Lymphadenopathy:      Cervical: No cervical adenopathy.   Skin:     General: Skin is warm and dry.   Neurological:      Mental Status: She is alert.   Psychiatric:         Behavior: Behavior normal.         Thought Content: Thought content normal.           Assessment and Plan (including Health Maintenance)   :    Plan:         Health Maintenance Due   Topic Date Due    TETANUS VACCINE  Never done    Hemoglobin A1c (Diabetic Prevention Screening)  Never done    RSV Vaccine (Age 60+ and Pregnant patients) (1 - 1-dose 60+ series) Never done    COVID-19 Vaccine (4 - 2023-24 season) 09/01/2023    Shingles Vaccine (2 of 2) 03/06/2024    Mammogram  03/29/2024    Pneumococcal Vaccines (Age 65+) (3 of 3 - PPSV23 or PCV20) 06/04/2024       Problem List Items Addressed This Visit    None  Visit Diagnoses       Closed nondisplaced fracture of base of fifth metacarpal bone of left hand with routine healing, subsequent encounter    -  Primary    Relevant Orders    X-Ray Hand 3 View Left (Completed)          Closed nondisplaced fracture of base of fifth metacarpal bone of left hand with routine healing, subsequent encounter  -     X-Ray Hand 3 View Left; Future; Expected date: 05/29/2024       Health Maintenance Topics with due status: Not Due       Topic Last Completion Date    Colorectal Cancer Screening 11/28/2022    DEXA Scan 08/10/2023    Lipid Panel 02/21/2024       Procedures     Future Appointments   Date Time Provider Department Center   6/4/2024  9:40 AM [facility]   6/4/2024 11:00 AM Chris Hutchison DO OB VEIN [facility]   8/14/2024  9:15 AM Philly Gooden FNP [facility]        No follow-ups on file.       Signature:  Tim Burgos MD  [facility]  [address] [place] [address]  [phone] Phone  [phone] Fax    Date of encounter: 5/29/24

## 2024-06-04 ENCOUNTER — OFFICE VISIT (OUTPATIENT)
Dept: VASCULAR SURGERY | Facility: CLINIC | Age: 69
End: 2024-06-04
Payer: MEDICARE

## 2024-06-04 ENCOUNTER — HOSPITAL ENCOUNTER (OUTPATIENT)
Dept: RADIOLOGY | Facility: HOSPITAL | Age: 69
Discharge: HOME | End: 2024-06-04
Attending: RADIOLOGY
Payer: MEDICARE

## 2024-06-04 VITALS
BODY MASS INDEX: 32.77 KG/M2 | WEIGHT: 208.81 LBS | HEIGHT: 67 IN | RESPIRATION RATE: 16 BRPM | DIASTOLIC BLOOD PRESSURE: 90 MMHG | HEART RATE: 60 BPM | SYSTOLIC BLOOD PRESSURE: 140 MMHG

## 2024-06-04 DIAGNOSIS — M79.605 LEG PAIN, BILATERAL: ICD-10-CM

## 2024-06-04 DIAGNOSIS — Z12.31 VISIT FOR SCREENING MAMMOGRAM: ICD-10-CM

## 2024-06-04 DIAGNOSIS — M79.604 LEG PAIN, BILATERAL: ICD-10-CM

## 2024-06-04 DIAGNOSIS — I87.2 VENOUS INSUFFICIENCY: Primary | ICD-10-CM

## 2024-06-04 DIAGNOSIS — R60.0 EDEMA, LOWER EXTREMITY: ICD-10-CM

## 2024-06-04 PROCEDURE — 77067 SCR MAMMO BI INCL CAD: CPT | Mod: TC

## 2024-06-04 PROCEDURE — 77063 BREAST TOMOSYNTHESIS BI: CPT | Mod: TC

## 2024-06-04 PROCEDURE — 99215 OFFICE O/P EST HI 40 MIN: CPT | Mod: PBBFAC | Performed by: FAMILY MEDICINE

## 2024-06-04 PROCEDURE — 77063 BREAST TOMOSYNTHESIS BI: CPT | Mod: 26,,, | Performed by: RADIOLOGY

## 2024-06-04 PROCEDURE — 77067 SCR MAMMO BI INCL CAD: CPT | Mod: 26,,, | Performed by: RADIOLOGY

## 2024-06-04 PROCEDURE — 99203 OFFICE O/P NEW LOW 30 MIN: CPT | Mod: S$PBB,,, | Performed by: FAMILY MEDICINE

## 2024-06-04 NOTE — PROGRESS NOTES
VEIN CENTER CLINIC NOTE    Patient ID: Victoria Delarosa is a 68 y.o. female.    I. HISTORY     Chief Complaint:   Chief Complaint   Patient presents with    Leg Swelling     Exam room 1.  NP self referral re: leg swelling        HPI: Victoria Delarosa is a 68 y.o. female who is referred here today by   Self referral for evaluation of  bilateral lower extremity edema and pain.  Symptoms are  persistent/worsening and began  greater than 5 years ago.  Location is bilateral  lower extremities below the knees. Symptoms are  worse at the end of the day.  History of venous interventions includes  vein procedures performed bilaterally by a physician in [facility] several years ago.    Denies family history of venous disease.   Patient also admits to history of left popliteal DVT and bilateral Pes about 4 years ago for which she was placed on warfarin and remains on it today.  Last INR was 1.8, she checks this at home.      Venous Disease Medical Necessity Documentation Initial Visit Date:  06/04/2024 Return Check Date:    Have you ever had a rupture or bleed from a varicose vein in your leg(s)?              [] Yes  [x] No   [] Yes   [] No   Have you ever been diagnosed with phlebitis, cellulitis, or inflammation in the area of the varicose veins of  your leg(s)? PE, DVT  [x] Yes  [] No    [] Yes   [] No   Do you have darkened or inflamed skin on your legs?   [x] Yes   [] No   [] Yes   [] No   Do you have leg swelling?     [x] Yes   [] No   [] Yes   [] No   Do you have leg pain?   [x] Yes   [] No   [] Yes   [] No   If yes, describe the type of pain?    [x]   Stabbing [x]  Radiating [x]  Aching   [x]  Tightness [x]  Throbbing               [x]  Burning [x]  Cramping              Do you have leg discomfort?   [x] Yes   [] No   [] Yes   [] No   If yes, describe the type of discomfort?    [x]  Heaviness []  Fullness   [x]  Restlessness [x] Tired/Fatigued [] Itching              Have you ever worn compression hose?   [x] Yes   [] No    [] Yes   [] No   If yes, how long?    1 1/2 TO 2 YEARS OFF AND ON       Do you elevate your legs while sitting?   [x] Yes   [] No   [] Yes   [] No   Does venous disease (varicose veins, ulcers, skin changes, leg pain/swelling) interfere with your daily life?  If yes, check activities you are limited or unable to do.    []  Shower  [x]   Walk  [x]  Exercise  [x] Play with children/grandchildren  [x]  Shop [] Work [x] Stand for any period of time [x] Sleep                               [x] Sitting for an extended period of time.           [x] Yes   [] No   [] Yes   [] No   Do you exercise/have you tried to exercise (i.e.  Walk our participate in a regular exercise routine)?  [x] Yes  [] No   [] Yes   [] No   BMI   32.7           Past Medical History:   Diagnosis Date    Allergy to statin medication 6/8/2022    Anxiety state     Benign neuroendocrine tumor of stomach 12/09/2022    right side    BMI 32.0-32.9,adult     Bronchitis     CHF (congestive heart failure)     Cobalamin deficiency     Constipation 5/22/2023    Contusion of left knee 8/12/2022    Contusion of right knee 8/12/2022    Deep vein thrombosis     Depressive disorder     Diarrhea 12/7/2022    Fall 8/12/2022    General anesthetics causing adverse effect in therapeutic use     GERD (gastroesophageal reflux disease)     Heart attack     20 yrs ago    Hypertension     Hypothyroidism     Lower extremity edema     Neuropathy     Osteoarthritis of both knees     Pain in left leg     Pain in right leg     Peripheral edema     Pulmonary embolism 2017    Stroke     Varicose veins of bilateral lower extremities with pain     Vitamin D deficiency         Past Surgical History:   Procedure Laterality Date    BILATERAL TUBAL LIGATION      CARDIAC CATHETERIZATION      COLONOSCOPY      EYE SURGERY      knee scope Left     THYROIDECTOMY, PARTIAL      TONSILLECTOMY, ADENOIDECTOMY      TOTAL KNEE ARTHROPLASTY Bilateral     TOTAL KNEE ARTHROPLASTY Right     TUBAL  LIGATION      VAGINAL DELIVERY      x 3       Social History     Tobacco Use   Smoking Status Never    Passive exposure: Past (brothers and sisters.)   Smokeless Tobacco Never         Current Outpatient Medications:     cetirizine (ZYRTEC) 10 MG tablet, Take 10 mg by mouth once daily., Disp: , Rfl:     cyanocobalamin 1,000 mcg/mL injection, INJECT 1 ML EVERY MONTH, Disp: 1 mL, Rfl: 11    ezetimibe (ZETIA) 10 mg tablet, Take 10 mg by mouth once daily., Disp: , Rfl:     furosemide (LASIX) 20 MG tablet, Take 1 tablet (20 mg total) by mouth once daily., Disp: 90 tablet, Rfl: 1    gabapentin (NEURONTIN) 100 MG capsule, Take 100 mg by mouth every evening., Disp: , Rfl:     levothyroxine (SYNTHROID) 25 MCG tablet, Take 1 tablet (25 mcg total) by mouth once daily., Disp: 90 tablet, Rfl: 1    losartan (COZAAR) 50 MG tablet, Take 1 tablet (50 mg total) by mouth once daily., Disp: 90 tablet, Rfl: 1    magnesium oxide (MAG-OX) 400 mg (241.3 mg magnesium) tablet, Take 1 tablet by mouth once daily., Disp: , Rfl:     oxybutynin (DITROPAN) 5 MG Tab, Take 1 tablet by mouth 2 (two) times daily., Disp: , Rfl:     potassium chloride (KLOR-CON) 10 MEQ TbSR, Take 1 tablet (10 mEq total) by mouth once daily., Disp: 90 tablet, Rfl: 1    ranolazine (RANEXA) 500 MG Tb12, Take 500 mg by mouth 2 (two) times daily., Disp: , Rfl:     sertraline (ZOLOFT) 100 MG tablet, Take 1 tablet (100 mg total) by mouth once daily., Disp: 90 tablet, Rfl: 1    spironolactone (ALDACTONE) 25 MG tablet, Take 1 tablet (25 mg total) by mouth once daily., Disp: 90 tablet, Rfl: 1    warfarin (COUMADIN) 5 MG tablet, Take 5 mg by mouth Daily., Disp: , Rfl:     alendronate (FOSAMAX) 70 MG tablet, Take 1 tablet (70 mg total) by mouth every 7 days. (Patient not taking: Reported on 1/25/2024), Disp: 4 tablet, Rfl: 11    calcium-vitamin D 600 mg-10 mcg (400 unit) Tab, Take 1 tablet by mouth 2 (two) times a day. (Patient not taking: Reported on 6/4/2024), Disp: 180 tablet,  Rfl: 3    cefUROXime (CEFTIN) 500 MG tablet, Take 1 tablet (500 mg total) by mouth 2 (two) times daily. (Patient not taking: Reported on 5/29/2024), Disp: 14 tablet, Rfl: 0    ketorolac 0.5% (ACULAR) 0.5 % Drop, , Disp: , Rfl:     moxifloxacin (VIGAMOX) 0.5 % ophthalmic solution, , Disp: , Rfl:     oxybutynin (DITROPAN) 5 MG Tab, TAKE 1 TABLET (5 MG TOTAL) BY MOUTH 2 (TWO) TIMES DAILY., Disp: 180 tablet, Rfl: 2    oxybutynin (DITROPAN) 5 MG Tab, Take 1 tablet (5 mg total) by mouth 2 (two) times daily., Disp: 90 tablet, Rfl: 1    prednisoLONE acetate (PRED FORTE) 1 % DrpS, Place 1 drop into the left eye once daily. (Patient not taking: Reported on 5/29/2024), Disp: , Rfl:     sulfamethoxazole-trimethoprim 800-160mg (BACTRIM DS) 800-160 mg Tab, Take 1 tablet by mouth 2 (two) times daily. (Patient not taking: Reported on 9/13/2023), Disp: 14 tablet, Rfl: 0    Review of Systems   Constitutional:  Negative for activity change, chills, diaphoresis, fatigue and fever.   Respiratory:  Negative for cough and shortness of breath.    Cardiovascular:  Positive for leg swelling. Negative for chest pain and claudication.        Hyperpigmentation LE   Gastrointestinal:  Negative for nausea and vomiting.   Musculoskeletal:  Positive for leg pain. Negative for joint swelling.   Integumentary:  Negative for rash and wound.   Neurological:  Negative for weakness and numbness.          II. PHYSICAL EXAM     Physical Exam  Constitutional:       General: She is awake. She is not in acute distress.     Appearance: Normal appearance. She is obese. She is not ill-appearing or toxic-appearing.   HENT:      Head: Normocephalic and atraumatic.   Eyes:      Extraocular Movements: Extraocular movements intact.      Conjunctiva/sclera: Conjunctivae normal.      Pupils: Pupils are equal, round, and reactive to light.   Neck:      Vascular: No carotid bruit or JVD.   Cardiovascular:      Rate and Rhythm: Normal rate and regular rhythm.      Pulses:            Dorsalis pedis pulses are detected w/ Doppler on the right side and detected w/ Doppler on the left side.        Posterior tibial pulses are detected w/ Doppler on the right side and detected w/ Doppler on the left side.      Heart sounds: No murmur heard.  Pulmonary:      Effort: Pulmonary effort is normal. No respiratory distress.      Breath sounds: No stridor. No wheezing, rhonchi or rales.   Musculoskeletal:         General: No swelling, tenderness or deformity.      Right lower le+ Edema present.      Left lower le+ Edema present.      Comments:  No evidence of open ulceration, weeping or cellulitis bilaterally.   Feet:      Comments:   Triphasic hand-held dopplerable pulses of the bilateral dorsalis pedis and posterior tibial arteries.  Skin:     General: Skin is warm.      Capillary Refill: Capillary refill takes less than 2 seconds.      Coloration: Skin is not ashen.      Findings: No bruising, erythema, lesion, rash or wound.   Neurological:      Mental Status: She is alert and oriented to person, place, and time.      Motor: No weakness.   Psychiatric:         Speech: Speech normal.         Behavior: Behavior normal. Behavior is cooperative.         Reticular/Spider veins noted:  RLE: anterior calf, medial calf, posterior calf, and lateral calf  LLE: anterior calf, medial calf, posterior calf, and lateral calf    Varicose veins noted:  RLE: none  LLE:  none    CEAP Classification                       Venous Clinical Severity Score     III. ASSESSMENT & PLAN (MEDICAL DECISION MAKING)     1. Venous insufficiency    2. Leg pain, bilateral    3. Edema, lower extremity        Assessment/Diagnosis and Plan:  Patient has complaints, symptoms and physical exam findings of chronic venous disease.  Therefore, I will order a bilateral complete venous reflux study and see the patient back with results.    Orders Placed This Encounter    US Venous Reflux Study Bilateral          Chris Hutchison,  DO

## 2024-07-09 ENCOUNTER — OFFICE VISIT (OUTPATIENT)
Dept: VASCULAR SURGERY | Facility: CLINIC | Age: 69
End: 2024-07-09
Attending: FAMILY MEDICINE
Payer: MEDICARE

## 2024-07-09 ENCOUNTER — HOSPITAL ENCOUNTER (OUTPATIENT)
Dept: RADIOLOGY | Facility: HOSPITAL | Age: 69
Discharge: HOME | End: 2024-07-09
Attending: FAMILY MEDICINE
Payer: MEDICARE

## 2024-07-09 VITALS
BODY MASS INDEX: 32.65 KG/M2 | HEIGHT: 67 IN | RESPIRATION RATE: 18 BRPM | DIASTOLIC BLOOD PRESSURE: 86 MMHG | WEIGHT: 208 LBS | SYSTOLIC BLOOD PRESSURE: 141 MMHG | HEART RATE: 83 BPM

## 2024-07-09 DIAGNOSIS — I87.2 VENOUS INSUFFICIENCY: Primary | ICD-10-CM

## 2024-07-09 DIAGNOSIS — I87.2 VENOUS INSUFFICIENCY (CHRONIC) (PERIPHERAL): ICD-10-CM

## 2024-07-09 DIAGNOSIS — M79.605 LEG PAIN, BILATERAL: ICD-10-CM

## 2024-07-09 DIAGNOSIS — R60.0 EDEMA, LOWER EXTREMITY: ICD-10-CM

## 2024-07-09 DIAGNOSIS — M79.604 LEG PAIN, BILATERAL: ICD-10-CM

## 2024-07-09 DIAGNOSIS — Z71.89 COMPLEX CARE COORDINATION: ICD-10-CM

## 2024-07-09 PROCEDURE — 99999 PR PBB SHADOW E&M-EST. PATIENT-LVL V: CPT | Mod: PBBFAC,,, | Performed by: FAMILY MEDICINE

## 2024-07-09 PROCEDURE — 99215 OFFICE O/P EST HI 40 MIN: CPT | Mod: PBBFAC,25 | Performed by: FAMILY MEDICINE

## 2024-07-09 PROCEDURE — 93970 EXTREMITY STUDY: CPT | Mod: TC

## 2024-07-09 PROCEDURE — 99214 OFFICE O/P EST MOD 30 MIN: CPT | Mod: S$PBB,,, | Performed by: FAMILY MEDICINE

## 2024-07-09 PROCEDURE — 93970 EXTREMITY STUDY: CPT | Mod: 26,,, | Performed by: FAMILY MEDICINE

## 2024-07-09 RX ORDER — SODIUM CHLORIDE 9 MG/ML
INJECTION, SOLUTION INTRAVENOUS CONTINUOUS
OUTPATIENT
Start: 2024-09-06

## 2024-07-09 RX ORDER — SODIUM CHLORIDE 9 MG/ML
INJECTION, SOLUTION INTRAVENOUS CONTINUOUS
OUTPATIENT
Start: 2024-08-30

## 2024-07-09 NOTE — PROGRESS NOTES
VEIN CENTER CLINIC NOTE    Patient ID: Vitcoria Delarosa is a 68 y.o. female.    I. HISTORY     Chief Complaint:   Chief Complaint   Patient presents with    Follow-up     US YVROSE COMP RESULTS        HPI: Victoria Delarosa is a 68 y.o. female who presents today for follow-up and results to a bilateral complete venous reflux study, today 07/09/2024, and the results were discussed with the patient.  This study shows no acute DVT bilaterally.  There post thrombotic changes within the left popliteal vein as well as deep venous reflux.  The study also shows a well ablated proximal right great and right small saphenous veins.  Reflux is noted within the distal right great saphenous vein, left great saphenous vein and left small saphenous vein.    The patient was initially seen on 06/04/2024 by self referral for evaluation of  bilateral lower extremity edema and pain.  Symptoms are  persistent/worsening and began  greater than 5 years ago.  Location is bilateral  lower extremities below the knees. Symptoms are  worse at the end of the day.  History of venous interventions includes  vein procedures performed bilaterally by a [facility] several years ago.    Denies family history of venous disease.   Patient also admits to history of left popliteal DVT and bilateral Pes about 4 years ago for which she was placed on warfarin and remains on it today.  Last INR was 1.8, she checks this at home.  The patient states she is worn compression stockings over the past 4 years, ever since her DVT and previous diagnosis of venous insufficiency.  She also walks regularly for exercise and elevate her legs when appropriate.  She feels despite these conservative measures her symptoms have worsened over the past several years and she would like to have underlying condition corrected if possible.    Venous Disease Medical Necessity Documentation Initial Visit Date:  06/04/2024 Return Check Date:    Have you ever had a rupture or  bleed from a varicose vein in your leg(s)?              [] Yes  [x] No   [] Yes   [] No   Have you ever been diagnosed with phlebitis, cellulitis, or inflammation in the area of the varicose veins of  your leg(s)? PE, DVT  [x] Yes  [] No    [] Yes   [] No   Do you have darkened or inflamed skin on your legs?   [x] Yes   [] No   [] Yes   [] No   Do you have leg swelling?     [x] Yes   [] No   [] Yes   [] No   Do you have leg pain?   [x] Yes   [] No   [] Yes   [] No   If yes, describe the type of pain?    [x]   Stabbing [x]  Radiating [x]  Aching   [x]  Tightness [x]  Throbbing               [x]  Burning [x]  Cramping              Do you have leg discomfort?   [x] Yes   [] No   [] Yes   [] No   If yes, describe the type of discomfort?    [x]  Heaviness []  Fullness   [x]  Restlessness [x] Tired/Fatigued [] Itching              Have you ever worn compression hose?   [x] Yes   [] No   [] Yes   [] No   If yes, how long?    1 1/2 TO 2 YEARS       Do you elevate your legs while sitting?   [x] Yes   [] No   [] Yes   [] No   Does venous disease (varicose veins, ulcers, skin changes, leg pain/swelling) interfere with your daily life?  If yes, check activities you are limited or unable to do.    []  Shower  [x]   Walk  [x]  Exercise  [x] Play with children/grandchildren  [x]  Shop [] Work [x] Stand for any period of time [x] Sleep                               [x] Sitting for an extended period of time.           [x] Yes   [] No   [] Yes   [] No   Do you exercise/have you tried to exercise (i.e.  Walk our participate in a regular exercise routine)?  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    BMI 32.0-32.9,adult     Bronchitis     CHF (congestive heart failure)     Cobalamin deficiency     Constipation 5/22/2023    Contusion of left knee  8/12/2022    Contusion of right knee 8/12/2022    Deep vein thrombosis     Depressive disorder     Diarrhea 12/7/2022    Fall 8/12/2022    General anesthetics causing adverse effect in therapeutic use     GERD (gastroesophageal reflux disease)     Heart attack     20 yrs ago    Hypertension     Hypothyroidism     Lower extremity edema     Neuropathy     Osteoarthritis of both knees     Pain in left leg     Pain in right leg     Peripheral edema     Pulmonary embolism 2017    Stroke     Varicose veins of bilateral lower extremities with pain     Vitamin D deficiency         Past Surgical History:   Procedure Laterality Date    BILATERAL TUBAL LIGATION      CARDIAC CATHETERIZATION      COLONOSCOPY      EYE SURGERY      knee scope Left     THYROIDECTOMY, PARTIAL      TONSILLECTOMY, ADENOIDECTOMY      TOTAL KNEE ARTHROPLASTY Bilateral     TOTAL KNEE ARTHROPLASTY Right     TUBAL LIGATION      VAGINAL DELIVERY      x 3       Social History     Tobacco Use   Smoking Status Never    Passive exposure: Past (brothers and sisters.)   Smokeless Tobacco Never         Current Outpatient Medications:     cetirizine (ZYRTEC) 10 MG tablet, Take 10 mg by mouth once daily., Disp: , Rfl:     cyanocobalamin 1,000 mcg/mL injection, INJECT 1 ML EVERY MONTH, Disp: 1 mL, Rfl: 11    ezetimibe (ZETIA) 10 mg tablet, Take 10 mg by mouth once daily., Disp: , Rfl:     furosemide (LASIX) 20 MG tablet, Take 1 tablet (20 mg total) by mouth once daily., Disp: 90 tablet, Rfl: 1    gabapentin (NEURONTIN) 100 MG capsule, Take 100 mg by mouth every evening., Disp: , Rfl:     levothyroxine (SYNTHROID) 25 MCG tablet, Take 1 tablet (25 mcg total) by mouth once daily., Disp: 90 tablet, Rfl: 1    losartan (COZAAR) 50 MG tablet, Take 1 tablet (50 mg total) by mouth once daily., Disp: 90 tablet, Rfl: 1    magnesium oxide (MAG-OX) 400 mg (241.3 mg magnesium) tablet, Take 1 tablet by mouth once daily., Disp: , Rfl:      oxybutynin (DITROPAN) 5 MG Tab, Take 1 tablet by mouth 2 (two) times daily., Disp: , Rfl:     oxybutynin (DITROPAN) 5 MG Tab, TAKE 1 TABLET (5 MG TOTAL) BY MOUTH 2 (TWO) TIMES DAILY., Disp: 180 tablet, Rfl: 2    oxybutynin (DITROPAN) 5 MG Tab, Take 1 tablet (5 mg total) by mouth 2 (two) times daily., Disp: 90 tablet, Rfl: 1    potassium chloride (KLOR-CON) 10 MEQ TbSR, Take 1 tablet (10 mEq total) by mouth once daily., Disp: 90 tablet, Rfl: 1    ranolazine (RANEXA) 500 MG Tb12, Take 500 mg by mouth 2 (two) times daily., Disp: , Rfl:     sertraline (ZOLOFT) 100 MG tablet, Take 1 tablet (100 mg total) by mouth once daily., Disp: 90 tablet, Rfl: 1    spironolactone (ALDACTONE) 25 MG tablet, Take 1 tablet (25 mg total) by mouth once daily., Disp: 90 tablet, Rfl: 1    warfarin (COUMADIN) 5 MG tablet, Take 5 mg by mouth Daily., Disp: , Rfl:     alendronate (FOSAMAX) 70 MG tablet, Take 1 tablet (70 mg total) by mouth every 7 days., Disp: 4 tablet, Rfl: 11    calcium-vitamin D 600 mg-10 mcg (400 unit) Tab, Take 1 tablet by mouth 2 (two) times a day., Disp: 180 tablet, Rfl: 3    cefUROXime (CEFTIN) 500 MG tablet, Take 1 tablet (500 mg total) by mouth 2 (two) times daily., Disp: 14 tablet, Rfl: 0    ketorolac 0.5% (ACULAR) 0.5 % Drop, , Disp: , Rfl:     moxifloxacin (VIGAMOX) 0.5 % ophthalmic solution, , Disp: , Rfl:     prednisoLONE acetate (PRED FORTE) 1 % DrpS, Place 1 drop into the left eye once daily., Disp: , Rfl:     sulfamethoxazole-trimethoprim 800-160mg (BACTRIM DS) 800-160 mg Tab, Take 1 tablet by mouth 2 (two) times daily., Disp: 14 tablet, Rfl: 0    Review of Systems   Constitutional:  Negative for activity change, chills, diaphoresis, fatigue and fever.   Respiratory:  Negative for cough and shortness of breath.    Cardiovascular:  Positive for leg swelling. Negative for chest pain and claudication.        Hyperpigmentation LE   Gastrointestinal:  Negative for nausea and vomiting.    Musculoskeletal:  Positive for leg pain. Negative for joint swelling.   Integumentary:  Negative for rash and wound.   Neurological:  Negative for weakness and numbness.          II. PHYSICAL EXAM     Physical Exam  Constitutional:       General: She is awake. She is not in acute distress.     Appearance: Normal appearance. She is obese. She is not ill-appearing or toxic-appearing.   HENT:      Head: Normocephalic and atraumatic.   Eyes:      Extraocular Movements: Extraocular movements intact.      Conjunctiva/sclera: Conjunctivae normal.      Pupils: Pupils are equal, round, and reactive to light.   Neck:      Vascular: No carotid bruit or JVD.   Cardiovascular:      Rate and Rhythm: Normal rate and regular rhythm.      Pulses:           Dorsalis pedis pulses are detected w/ Doppler on the right side and detected w/ Doppler on the left side.        Posterior tibial pulses are detected w/ Doppler on the right side and detected w/ Doppler on the left side.      Heart sounds: No murmur heard.  Pulmonary:      Effort: Pulmonary effort is normal. No respiratory distress.      Breath sounds: No stridor. No wheezing, rhonchi or rales.   Musculoskeletal:         General: No swelling, tenderness or deformity.      Right lower le+ Edema present.      Left lower le+ Edema present.      Comments:  No evidence of open ulceration, weeping or cellulitis bilaterally.   Feet:      Comments:   Triphasic hand-held dopplerable pulses of the bilateral dorsalis pedis and posterior tibial arteries.  Skin:     General: Skin is warm.      Capillary Refill: Capillary refill takes less than 2 seconds.      Coloration: Skin is not ashen.      Findings: No bruising, erythema, lesion, rash or wound.   Neurological:      Mental Status: She is alert and oriented to person, place, and time.      Motor: No weakness.   Psychiatric:         Speech: Speech normal.         Behavior: Behavior normal. Behavior is cooperative.        Reticular/Spider veins noted:  RLE: anterior calf, medial calf, posterior calf, and lateral calf  LLE: anterior calf, medial calf, posterior calf, and lateral calf    Varicose veins noted:  RLE: none  LLE:  none    CEAP Classification  Clinical Signs: Class 4 - Skin changes ascribed to venous disease  Etiologic Classification: Primary  Anatomic distribution: Superficial  Pathophysiologic dysfunction: Reflux                         Venous Clinical Severity Score  Pain:2=Daily, moderate activity limitation, occasional analgesics  Varicose Veins: 1=Few, scattered. Branch varicose veins  Venous Edema: 2=Afternoon edema, above ankle  Pigmentation: 0=None or focal, low intensity (tan)  Inflammation: 0=None  Induration: 0=None  Number of Active Ulcers: 0=0  Active Ulceration, Duration: 0=None  Active Ulcer Size: 0=None  Compressive Therapy: 3=Full compliance, stockings + elevation  Total Score: 8       III. ASSESSMENT & PLAN (MEDICAL DECISION MAKING)     1. Venous insufficiency    2. Leg pain, bilateral    3. Edema, lower extremity        Assessment/Diagnosis and Plan:  The patient continues to have sequela of chronic venous insufficiency despite over 3 months of conservative therapy. The patient continues to have life altering symptoms as well as a positive reflux study as noted in the history of present illness above. At this time I believe it would be reasonable to proceed with a left great saphenous and left small saphenous vein non thermal adhesive endovenous ablation for symptomatic venous insufficiency.  I Also believe would be reasonable to proceed with a right distal great saphenous vein non thermal ablation using non compound foam sclerosant 1%.  Untreated venous disease increases the patient's risk for cellulitis, deep vein thrombosis, chronic skin changes and venous ulceration.  Risk and benefits of the procedure were explained to the patient and the patient wishes to proceed. The patient does have a  history of DVT and will remain on her long-term chronic anticoagulation with warfarin.  We will check an INR the day of the procedure.          Chris Hutchison, DO

## 2024-07-09 NOTE — PATIENT INSTRUCTIONS
Pre Procedure Information    Procedure (s) and Date (s):1. Right Distal GSV Varithena Ablation - 08/29/2024 @ 8:00 a.m.      We will call you the day prior with your time to report for surgery.  You will check in at [facility].  Shower prior to procedure as your leg will be wrapped for 48 hours and you will not be able to shower.  Do not wear lotion, oil, or cream on you legs on the day of your procedure.  This will cause the Doctor's nnamdi to fade.  Wear shorts, skirt, or loose pants and larger shoes (house shoes).   Bring a pillow or two and leave in the car (to prop your leg).  Prepare to walk 15 minutes out of each hour for the first 48 hours post op.  Prepare to keep your legs propped up while sitting for the first 48 hours or 2 days following your procedure (recliner, couch, or chair).  Dressings will remain on your legs for at least 48 hours or 2 days after procedure.  Full discharge instructions will be provided on the day of your procedure.    Typically, you are in and out within a few hours.  We will follow you postoperatively with a 4 day post ablation ultrasound and then a 1 month, 3 month, 6 month, and 1 year post ablation visit with the physician or nurse practitioner with or without an ultrasound.  Please make every effort to attend these appointments as they will be essential to following you progress.  Please kindly notify us as soon as possible if you need to reschedule your appointment.  As always, we look forward to caring for you and are happy to answer your questions.  Please call us at [phone].       Pre Prodedure Information    Procedure (s) and Date (s):  1. Left Distal GSV VenaSeal Ablatio - 08/30/2024                                                2. Left SSV VenaSeal Ablation - 09/06/2024    Do not eat or drink anything after midnight.  You may take medications that are listed Synthroid and Losartan with a small sip of water morning of procedure.  We will call you the  day prior with your time to report for surgery.  You will check in at [facility].    Shower prior to procedure as your leg will be wrapped for 48 hours and you will not be able to shower.  Do not wear lotion, oil, or cream on you legs on the day of your procedure.  This will cause the Doctor's nnamdi to fade.  Wear shorts, skirt, or loose pants and larger shoes (house shoes).   Bring a pillow or two and leave in the car (to prop your leg).  Bring someone with you to drive, stay during the procedure, and drive you home.  Someone will also need to stay with you at home the first night.  No driving for 24 hours after sedation.  Prepare to walk 15 minutes out of each hour for the first 48 hours post op.  Prepare to keep your legs propped up while sitting for the first 48 hours or 2 days following your procedure (recliner, couch, or chair).  Dressings will remain on your legs for at least 48 hours or 2 days after procedure.  Full discharge instructions will be provided on the day of your procedure.  Typically, you are in and out within a few hours.  We will follow you postoperatively with a 4 day post ablation ultrasound and then a 1 month, 3 month, 6 month, and 1 year post ablation visit with the physician or nurse practitioner with or without an ultrasound.  Please make every effort to attend these appointments as they will be essential to following you progress.  Please kindly notify us as soon as possible if you need to reschedule your appointment.  As always, we look forward to caring for you and are happy to answer your questions.  Please call us at [phone].

## 2024-07-11 ENCOUNTER — OFFICE VISIT (OUTPATIENT)
Dept: FAMILY MEDICINE | Facility: CLINIC | Age: 69
End: 2024-07-11
Payer: MEDICARE

## 2024-07-11 ENCOUNTER — APPOINTMENT (OUTPATIENT)
Dept: RADIOLOGY | Facility: CLINIC | Age: 69
End: 2024-07-11
Attending: FAMILY MEDICINE
Payer: MEDICARE

## 2024-07-11 VITALS
OXYGEN SATURATION: 98 % | BODY MASS INDEX: 33.3 KG/M2 | HEART RATE: 98 BPM | TEMPERATURE: 98 F | DIASTOLIC BLOOD PRESSURE: 88 MMHG | SYSTOLIC BLOOD PRESSURE: 130 MMHG | WEIGHT: 212.19 LBS | HEIGHT: 67 IN

## 2024-07-11 DIAGNOSIS — M79.671 RIGHT FOOT PAIN: ICD-10-CM

## 2024-07-11 DIAGNOSIS — R53.83 FATIGUE, UNSPECIFIED TYPE: ICD-10-CM

## 2024-07-11 DIAGNOSIS — Z79.899 LONG-TERM USE OF HIGH-RISK MEDICATION: ICD-10-CM

## 2024-07-11 DIAGNOSIS — M79.671 RIGHT FOOT PAIN: Primary | ICD-10-CM

## 2024-07-11 LAB
ANION GAP SERPL CALCULATED.3IONS-SCNC: 8 MMOL/L (ref 7–16)
BUN SERPL-MCNC: 16 MG/DL (ref 7–18)
BUN/CREAT SERPL: 12 (ref 6–20)
CALCIUM SERPL-MCNC: 9.8 MG/DL (ref 8.5–10.1)
CHLORIDE SERPL-SCNC: 106 MMOL/L (ref 98–107)
CO2 SERPL-SCNC: 29 MMOL/L (ref 21–32)
CREAT SERPL-MCNC: 1.29 MG/DL (ref 0.55–1.02)
EGFR (NO RACE VARIABLE) (RUSH/TITUS): 45 ML/MIN/1.73M2
GLUCOSE SERPL-MCNC: 86 MG/DL (ref 74–106)
POTASSIUM SERPL-SCNC: 3.9 MMOL/L (ref 3.5–5.1)
SODIUM SERPL-SCNC: 139 MMOL/L (ref 136–145)
T4 FREE SERPL-MCNC: 1.18 NG/DL (ref 0.76–1.46)
TSH SERPL DL<=0.005 MIU/L-ACNC: 1.78 UIU/ML (ref 0.36–3.74)
VIT B12 SERPL-MCNC: 374 PG/ML (ref 193–986)

## 2024-07-11 PROCEDURE — 84443 ASSAY THYROID STIM HORMONE: CPT | Mod: ,,, | Performed by: CLINICAL MEDICAL LABORATORY

## 2024-07-11 PROCEDURE — 82607 VITAMIN B-12: CPT | Mod: ,,, | Performed by: CLINICAL MEDICAL LABORATORY

## 2024-07-11 PROCEDURE — 84439 ASSAY OF FREE THYROXINE: CPT | Mod: ,,, | Performed by: CLINICAL MEDICAL LABORATORY

## 2024-07-11 PROCEDURE — 73630 X-RAY EXAM OF FOOT: CPT | Mod: 26,RT,, | Performed by: RADIOLOGY

## 2024-07-11 PROCEDURE — 99213 OFFICE O/P EST LOW 20 MIN: CPT | Mod: ,,, | Performed by: FAMILY MEDICINE

## 2024-07-11 PROCEDURE — 73630 X-RAY EXAM OF FOOT: CPT | Mod: TC,RHCUB,FY,RT | Performed by: FAMILY MEDICINE

## 2024-07-11 PROCEDURE — 80048 BASIC METABOLIC PNL TOTAL CA: CPT | Mod: ,,, | Performed by: CLINICAL MEDICAL LABORATORY

## 2024-07-11 NOTE — PROGRESS NOTES
Tim Burgos MD   [facility]  [address] [place] [address]     PATIENT NAME: Victoria Delarosa  : 1955  DATE: 24  MRN: [id number]      Billing Provider: Tim Burgos MD  Level of Service: CT OFFICE/OUTPT VISIT, EST, LEVL III, 20-29 MIN  Patient PCP Information       Provider PCP Type    Tim Burgos MD General            Reason for Visit / Chief Complaint: Foot Pain (Pain to top of right foot and ankle. Patient states she fell  and felt a pop. Patient having bruising and swelling. ), Fatigue (Fatigue that has gradually gotten worse over the last few weeks. ), and Memory Loss (Patient states  is concerned with memory loss. )         History of Present Illness / Problem Focused Workflow     Victoria Delarosa presents to the clinic with Foot Pain (Pain to top of right foot and ankle. Patient states she fell  and felt a pop. Patient having bruising and swelling. ), Fatigue (Fatigue that has gradually gotten worse over the last few weeks. ), and Memory Loss (Patient states  is concerned with memory loss. )     HPI    Review of Systems     Review of Systems   Constitutional:  Negative for activity change, appetite change, fatigue and fever.   HENT:  Negative for nasal congestion, ear pain, hearing loss, sinus pressure/congestion and sore throat.    Respiratory:  Negative for cough, chest tightness and shortness of breath.    Cardiovascular:  Negative for chest pain and palpitations.   Gastrointestinal:  Negative for abdominal pain and fecal incontinence.   Genitourinary:  Negative for bladder incontinence and difficulty urinating.   Musculoskeletal:  Positive for gait problem and leg pain. Negative for arthralgias.   Integumentary:  Negative for rash.   Neurological:  Negative for dizziness and headaches.        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    BMI 32.0-32.9,adult     Bronchitis     CHF (congestive heart failure)     Cobalamin deficiency     Constipation 5/22/2023    Contusion of left knee 8/12/2022    Contusion of right knee 8/12/2022    Deep vein thrombosis     Depressive disorder     Diarrhea 12/7/2022    Fall 8/12/2022    General anesthetics causing adverse effect in therapeutic use     GERD (gastroesophageal reflux disease)     Heart attack     20 yrs ago    Hypertension     Hypothyroidism     Lower extremity edema     Neuropathy     Osteoarthritis of both knees     Pain in left leg     Pain in right leg     Peripheral edema     Pulmonary embolism 2017    Stroke     Varicose veins of bilateral lower extremities with pain     Vitamin D deficiency        Past Surgical History:   Procedure Laterality Date    BILATERAL TUBAL LIGATION      CARDIAC CATHETERIZATION      COLONOSCOPY      EYE SURGERY      knee scope Left     THYROIDECTOMY, PARTIAL      TONSILLECTOMY, ADENOIDECTOMY      TOTAL KNEE ARTHROPLASTY Bilateral     TOTAL KNEE ARTHROPLASTY Right     TUBAL LIGATION      VAGINAL DELIVERY      x 3       Social History  Victoria Delarosa  reports that she has never smoked. She has been exposed to tobacco smoke. She has never used smokeless tobacco. She reports that she does not drink alcohol and does not use drugs.    Family History  Victoria Delarosa  family history includes Arthritis in her brother and sister; Drug abuse in her son; Heart disease in her brother, mother, and sister; Hypertension in her mother; Liver disease in her father; No Known Problems in her brother, daughter, and son; Stroke in her brother.    Medications and Allergies     Medications  Outpatient Medications Marked as Taking for the 7/11/24 encounter (Office Visit) with Tim Burgos MD   Medication Sig Dispense Refill    cetirizine (ZYRTEC) 10 MG tablet Take 10 mg by mouth once daily.       cyanocobalamin 1,000 mcg/mL injection INJECT 1 ML EVERY MONTH 1 mL 11    ezetimibe (ZETIA) 10 mg tablet Take 10 mg by mouth once daily.      furosemide (LASIX) 20 MG tablet Take 1 tablet (20 mg total) by mouth once daily. 90 tablet 1    gabapentin (NEURONTIN) 100 MG capsule Take 100 mg by mouth every evening.      levothyroxine (SYNTHROID) 25 MCG tablet Take 1 tablet (25 mcg total) by mouth once daily. 90 tablet 1    losartan (COZAAR) 50 MG tablet Take 1 tablet (50 mg total) by mouth once daily. 90 tablet 1    magnesium oxide (MAG-OX) 400 mg (241.3 mg magnesium) tablet Take 1 tablet by mouth once daily.      oxybutynin (DITROPAN) 5 MG Tab Take 1 tablet by mouth 2 (two) times daily.      oxybutynin (DITROPAN) 5 MG Tab TAKE 1 TABLET (5 MG TOTAL) BY MOUTH 2 (TWO) TIMES DAILY. 180 tablet 2    oxybutynin (DITROPAN) 5 MG Tab Take 1 tablet (5 mg total) by mouth 2 (two) times daily. 90 tablet 1    potassium chloride (KLOR-CON) 10 MEQ TbSR Take 1 tablet (10 mEq total) by mouth once daily. 90 tablet 1    ranolazine (RANEXA) 500 MG Tb12 Take 500 mg by mouth 2 (two) times daily.      sertraline (ZOLOFT) 100 MG tablet Take 1 tablet (100 mg total) by mouth once daily. 90 tablet 1    spironolactone (ALDACTONE) 25 MG tablet Take 1 tablet (25 mg total) by mouth once daily. 90 tablet 1    warfarin (COUMADIN) 5 MG tablet Take 5 mg by mouth Daily.         Allergies  Review of patient's allergies indicates:   Allergen Reactions    Lisinopril      Cough      Statins-hmg-coa reductase inhibitors Hives and Itching       Physical Examination     Vitals:    07/11/24 1022   BP: 130/88   Pulse: 98   Temp: 98 °F (36.7 °C)     Physical Exam  Constitutional:       General: She is not in acute distress.     Appearance: She is not ill-appearing.   HENT:      Head: Normocephalic and atraumatic.      Right Ear: Tympanic membrane and ear canal normal.      Left Ear: Tympanic membrane and ear canal normal.      Nose: Nose normal. No congestion or  rhinorrhea.   Eyes:      Pupils: Pupils are equal, round, and reactive to light.   Cardiovascular:      Rate and Rhythm: Normal rate and regular rhythm.      Pulses: Normal pulses.      Heart sounds: No murmur heard.  Pulmonary:      Effort: No respiratory distress.      Breath sounds: No wheezing, rhonchi or rales.   Abdominal:      General: Bowel sounds are normal.      Palpations: Abdomen is soft.      Tenderness: There is no abdominal tenderness.      Hernia: No hernia is present.   Musculoskeletal:         General: Tenderness (right lateral ankle tender) present.      Cervical back: Normal range of motion and neck supple.   Lymphadenopathy:      Cervical: No cervical adenopathy.   Skin:     General: Skin is warm and dry.   Neurological:      Mental Status: She is alert.   Psychiatric:         Behavior: Behavior normal.         Thought Content: Thought content normal.          Assessment and Plan (including Health Maintenance)   :    Plan:         Health Maintenance Due   Topic Date Due    TETANUS VACCINE  Never done    Hemoglobin A1c (Diabetic Prevention Screening)  Never done    RSV Vaccine (Age 60+ and Pregnant patients) (1 - 1-dose 60+ series) Never done    COVID-19 Vaccine (4 - 2023-24 season) 09/01/2023    Shingles Vaccine (2 of 2) 03/06/2024    Pneumococcal Vaccines (Age 65+) (3 of 3 - PPSV23 or PCV20) 06/04/2024       Problem List Items Addressed This Visit          Other    Fatigue    Relevant Orders    Basic Metabolic Panel (Completed)    T4, Free (Completed)    TSH (Completed)    Vitamin B12 (Completed)     Other Visit Diagnoses       Right foot pain    -  Primary    Relevant Orders    X-Ray Foot Complete Right (Completed)    Long-term use of high-risk medication        Relevant Orders    Vitamin B12 (Completed)          Right foot pain  -     X-Ray Foot Complete Right; Future; Expected date: 07/11/2024    Fatigue, unspecified type  -     Basic Metabolic Panel; Future; Expected date: 07/11/2024  -      T4, Free; Future; Expected date: 07/11/2024  -     TSH; Future; Expected date: 07/11/2024  -     Vitamin B12; Future; Expected date: 07/11/2024    Long-term use of high-risk medication  -     Vitamin B12; Future; Expected date: 07/11/2024       Health Maintenance Topics with due status: Not Due       Topic Last Completion Date    Colorectal Cancer Screening 11/28/2022    DEXA Scan 08/10/2023    Influenza Vaccine 01/10/2024    Lipid Panel 02/21/2024    Mammogram 06/04/2024       Procedures     Future Appointments   Date Time Provider Department Center   8/14/2024  9:15 AM Philly Gooden FNP [facility]   8/29/2024  8:00 AM Chris Hutchison DO [facility]   7/22/2025  8:20 AM [facility]        No follow-ups on file.       Signature:  Tim Burgos MD  [facility]  [address] [place] [address]  [phone] Phone  [phone] Fax    Date of encounter: 7/11/24

## 2024-07-24 ENCOUNTER — OFFICE VISIT (OUTPATIENT)
Dept: FAMILY MEDICINE | Facility: CLINIC | Age: 69
End: 2024-07-24
Payer: MEDICARE

## 2024-07-24 VITALS
WEIGHT: 212.38 LBS | TEMPERATURE: 98 F | BODY MASS INDEX: 33.33 KG/M2 | HEIGHT: 67 IN | HEART RATE: 63 BPM | OXYGEN SATURATION: 98 % | SYSTOLIC BLOOD PRESSURE: 118 MMHG | DIASTOLIC BLOOD PRESSURE: 74 MMHG

## 2024-07-24 DIAGNOSIS — R31.9 HEMATURIA, UNSPECIFIED TYPE: Primary | ICD-10-CM

## 2024-07-24 DIAGNOSIS — R53.83 FATIGUE, UNSPECIFIED TYPE: ICD-10-CM

## 2024-07-24 DIAGNOSIS — N30.01 ACUTE CYSTITIS WITH HEMATURIA: ICD-10-CM

## 2024-07-24 LAB
BILIRUB SERPL-MCNC: ABNORMAL MG/DL
BLOOD URINE, POC: ABNORMAL
CLARITY, POC UA: ABNORMAL
COLOR, POC UA: ABNORMAL
GLUCOSE UR QL STRIP: NEGATIVE
KETONES UR QL STRIP: NEGATIVE
LEUKOCYTE ESTERASE URINE, POC: ABNORMAL
NITRITE, POC UA: POSITIVE
PH, POC UA: 6
PROTEIN, POC: ABNORMAL
SPECIFIC GRAVITY, POC UA: >1.03
UROBILINOGEN, POC UA: 1

## 2024-07-24 PROCEDURE — 96372 THER/PROPH/DIAG INJ SC/IM: CPT | Mod: ,,, | Performed by: FAMILY MEDICINE

## 2024-07-24 PROCEDURE — 99213 OFFICE O/P EST LOW 20 MIN: CPT | Mod: ,,, | Performed by: FAMILY MEDICINE

## 2024-07-24 PROCEDURE — 81003 URINALYSIS AUTO W/O SCOPE: CPT | Mod: RHCUB | Performed by: FAMILY MEDICINE

## 2024-07-24 RX ORDER — CEFTRIAXONE 1 G/1
1 INJECTION, POWDER, FOR SOLUTION INTRAMUSCULAR; INTRAVENOUS
Status: COMPLETED | OUTPATIENT
Start: 2024-07-24 | End: 2024-07-24

## 2024-07-24 RX ORDER — CIPROFLOXACIN 250 MG/1
250 TABLET, FILM COATED ORAL 2 TIMES DAILY
Qty: 14 TABLET | Refills: 0 | Status: SHIPPED | OUTPATIENT
Start: 2024-07-24 | End: 2024-07-31

## 2024-07-24 RX ADMIN — CEFTRIAXONE 1 G: 1 INJECTION, POWDER, FOR SOLUTION INTRAMUSCULAR; INTRAVENOUS at 01:07

## 2024-07-24 NOTE — PROGRESS NOTES
Tim Burgos MD   [facility]  [address] [place] [address]     PATIENT NAME: Victoria Delarosa  : 1955  DATE: 24  MRN: [id number]      Billing Provider: Tim Burgos MD  Level of Service:   Patient PCP Information       Provider PCP Type    Tim Burgos MD General            Reason for Visit / Chief Complaint: Hematuria (Bloody urine, lower abdominal pain, small amount of nausea, hesitancy that started this morning. ) and Abdominal Pain         History of Present Illness / Problem Focused Workflow     Victoria Delarosa presents to the clinic with Hematuria (Bloody urine, lower abdominal pain, small amount of nausea, hesitancy that started this morning. ) and Abdominal Pain     HPI    Review of Systems     Review of Systems   Constitutional:  Negative for activity change, appetite change, fatigue and fever.   HENT:  Negative for nasal congestion, ear pain, hearing loss, sinus pressure/congestion and sore throat.    Respiratory:  Negative for cough, chest tightness and shortness of breath.    Cardiovascular:  Negative for chest pain and palpitations.   Gastrointestinal:  Negative for abdominal pain and fecal incontinence.   Genitourinary:  Positive for difficulty urinating, dysuria, flank pain and hematuria. Negative for bladder incontinence.   Musculoskeletal:  Negative for arthralgias.   Integumentary:  Negative for rash.   Neurological:  Negative for dizziness and headaches.        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    BMI 32.0-32.9,adult     Bronchitis     CHF (congestive heart failure)     Cobalamin deficiency     Constipation 2023    Contusion of left knee 2022    Contusion of right knee 2022    Deep vein thrombosis     Depressive disorder     Diarrhea 2022    Fall  8/12/2022    General anesthetics causing adverse effect in therapeutic use     GERD (gastroesophageal reflux disease)     Heart attack     20 yrs ago    Hypertension     Hypothyroidism     Lower extremity edema     Neuropathy     Osteoarthritis of both knees     Pain in left leg     Pain in right leg     Peripheral edema     Pulmonary embolism 2017    Stroke     Varicose veins of bilateral lower extremities with pain     Vitamin D deficiency        Past Surgical History:   Procedure Laterality Date    BILATERAL TUBAL LIGATION      CARDIAC CATHETERIZATION      COLONOSCOPY      EYE SURGERY      knee scope Left     THYROIDECTOMY, PARTIAL      TONSILLECTOMY, ADENOIDECTOMY      TOTAL KNEE ARTHROPLASTY Bilateral     TOTAL KNEE ARTHROPLASTY Right     TUBAL LIGATION      VAGINAL DELIVERY      x 3       Social History  Victoria Delarosa  reports that she has never smoked. She has been exposed to tobacco smoke. She has never used smokeless tobacco. She reports that she does not drink alcohol and does not use drugs.    Family History  Victoria Delarosa  family history includes Arthritis in her brother and sister; Drug abuse in her son; Heart disease in her brother, mother, and sister; Hypertension in her mother; Liver disease in her father; No Known Problems in her brother, daughter, and son; Stroke in her brother.    Medications and Allergies     Medications  Outpatient Medications Marked as Taking for the 7/24/24 encounter (Office Visit) with Tim Burgos MD   Medication Sig Dispense Refill    cetirizine (ZYRTEC) 10 MG tablet Take 10 mg by mouth once daily.      cyanocobalamin 1,000 mcg/mL injection INJECT 1 ML EVERY MONTH 1 mL 11    ezetimibe (ZETIA) 10 mg tablet Take 10 mg by mouth once daily.      furosemide (LASIX) 20 MG tablet Take 1 tablet (20 mg total) by mouth once daily. 90 tablet 1    gabapentin (NEURONTIN) 100 MG capsule Take 100 mg by mouth every evening.      levothyroxine (SYNTHROID) 25 MCG tablet Take 1  tablet (25 mcg total) by mouth once daily. 90 tablet 1    losartan (COZAAR) 50 MG tablet Take 1 tablet (50 mg total) by mouth once daily. (Patient taking differently: Take 50 mg by mouth once daily. Taking 1/2 tablet daily) 90 tablet 1    magnesium oxide (MAG-OX) 400 mg (241.3 mg magnesium) tablet Take 1 tablet by mouth once daily.      oxybutynin (DITROPAN) 5 MG Tab TAKE 1 TABLET (5 MG TOTAL) BY MOUTH 2 (TWO) TIMES DAILY. 180 tablet 2    potassium chloride (KLOR-CON) 10 MEQ TbSR Take 1 tablet (10 mEq total) by mouth once daily. 90 tablet 1    ranolazine (RANEXA) 500 MG Tb12 Take 500 mg by mouth 2 (two) times daily.      sertraline (ZOLOFT) 100 MG tablet Take 1 tablet (100 mg total) by mouth once daily. 90 tablet 1    spironolactone (ALDACTONE) 25 MG tablet Take 1 tablet (25 mg total) by mouth once daily. 90 tablet 1    warfarin (COUMADIN) 5 MG tablet Take 5 mg by mouth Daily.         Allergies  Review of patient's allergies indicates:   Allergen Reactions    Lisinopril      Cough      Statins-hmg-coa reductase inhibitors Hives and Itching       Physical Examination     Vitals:    07/24/24 1312   BP: 118/74   Pulse: 63   Temp: 98.1 °F (36.7 °C)     Physical Exam  Constitutional:       General: She is not in acute distress.     Appearance: She is not ill-appearing.   HENT:      Head: Normocephalic and atraumatic.      Right Ear: Tympanic membrane and ear canal normal.      Left Ear: Tympanic membrane and ear canal normal.      Nose: Nose normal. No congestion or rhinorrhea.   Eyes:      Pupils: Pupils are equal, round, and reactive to light.   Cardiovascular:      Rate and Rhythm: Normal rate and regular rhythm.      Pulses: Normal pulses.      Heart sounds: No murmur heard.  Pulmonary:      Effort: No respiratory distress.      Breath sounds: No wheezing, rhonchi or rales.   Abdominal:      General: Bowel sounds are normal.      Palpations: Abdomen is soft.      Tenderness: There is no abdominal tenderness.       Hernia: No hernia is present.   Musculoskeletal:      Cervical back: Normal range of motion and neck supple.   Lymphadenopathy:      Cervical: No cervical adenopathy.   Skin:     General: Skin is warm and dry.   Neurological:      Mental Status: She is alert.   Psychiatric:         Behavior: Behavior normal.         Thought Content: Thought content normal.          Assessment and Plan (including Health Maintenance)   :    Plan:         Health Maintenance Due   Topic Date Due    TETANUS VACCINE  Never done    Hemoglobin A1c (Diabetic Prevention Screening)  Never done    RSV Vaccine (Age 60+ and Pregnant patients) (1 - 1-dose 60+ series) Never done    COVID-19 Vaccine (4 - 2023-24 season) 09/01/2023    Shingles Vaccine (2 of 2) 03/06/2024    Pneumococcal Vaccines (Age 65+) (3 of 3 - PPSV23 or PCV20) 06/04/2024       Problem List Items Addressed This Visit    None  Visit Diagnoses       Hematuria, unspecified type    -  Primary    Relevant Orders    POCT URINALYSIS W/O SCOPE (Completed)          Hematuria, unspecified type  -     POCT URINALYSIS W/O SCOPE       Health Maintenance Topics with due status: Not Due       Topic Last Completion Date    Colorectal Cancer Screening 11/28/2022    DEXA Scan 08/10/2023    Influenza Vaccine 01/10/2024    Lipid Panel 02/21/2024    Mammogram 06/04/2024       Procedures     Future Appointments   Date Time Provider Department Center   8/14/2024  9:15 AM Philly Gooden FNP [facility]   8/29/2024  8:00 AM Chris Hutchison, DO OB [facility]   7/22/2025  8:20 AM [facility] M[facility]MO1 [facility]        No follow-ups on file.       Signature:  Tim Burgos MD  [facility]  [address] [place] [address]  [phone] Phone  [phone] Fax    Date of encounter: 7/24/24

## 2024-08-14 ENCOUNTER — OFFICE VISIT (OUTPATIENT)
Dept: FAMILY MEDICINE | Facility: CLINIC | Age: 69
End: 2024-08-14
Payer: MEDICARE

## 2024-08-14 VITALS
TEMPERATURE: 99 F | SYSTOLIC BLOOD PRESSURE: 132 MMHG | WEIGHT: 206 LBS | RESPIRATION RATE: 20 BRPM | HEART RATE: 70 BPM | HEIGHT: 67 IN | OXYGEN SATURATION: 95 % | DIASTOLIC BLOOD PRESSURE: 90 MMHG | BODY MASS INDEX: 32.33 KG/M2

## 2024-08-14 DIAGNOSIS — D3A.8 BENIGN NEUROENDOCRINE TUMOR OF STOMACH: ICD-10-CM

## 2024-08-14 DIAGNOSIS — I10 HYPERTENSION, UNSPECIFIED TYPE: ICD-10-CM

## 2024-08-14 DIAGNOSIS — I25.10 CORONARY ARTERY DISEASE INVOLVING NATIVE CORONARY ARTERY OF NATIVE HEART WITHOUT ANGINA PECTORIS: ICD-10-CM

## 2024-08-14 DIAGNOSIS — N18.31 CHRONIC KIDNEY DISEASE, STAGE 3A: ICD-10-CM

## 2024-08-14 DIAGNOSIS — I85.00 IDIOPATHIC ESOPHAGEAL VARICES WITHOUT BLEEDING: ICD-10-CM

## 2024-08-14 DIAGNOSIS — M35.01 SJOGREN SYNDROME WITH KERATOCONJUNCTIVITIS: ICD-10-CM

## 2024-08-14 DIAGNOSIS — F32.A DEPRESSIVE DISORDER: ICD-10-CM

## 2024-08-14 DIAGNOSIS — I71.20 THORACIC AORTIC ANEURYSM WITHOUT RUPTURE, UNSPECIFIED PART: ICD-10-CM

## 2024-08-14 DIAGNOSIS — E03.9 HYPOTHYROIDISM, UNSPECIFIED TYPE: ICD-10-CM

## 2024-08-14 DIAGNOSIS — Z00.00 ENCOUNTER FOR SUBSEQUENT ANNUAL WELLNESS VISIT (AWV) IN MEDICARE PATIENT: Primary | ICD-10-CM

## 2024-08-14 DIAGNOSIS — I11.9 HYPERTENSIVE HEART DISEASE WITHOUT HEART FAILURE: ICD-10-CM

## 2024-08-14 DIAGNOSIS — Z91.81 HISTORY OF FALLING: ICD-10-CM

## 2024-08-14 PROBLEM — Z86.010 HISTORY OF COLON POLYPS: Status: RESOLVED | Noted: 2022-11-28 | Resolved: 2024-08-14

## 2024-08-14 PROBLEM — R60.0 EDEMA, LOWER EXTREMITY: Status: RESOLVED | Noted: 2024-07-09 | Resolved: 2024-08-14

## 2024-08-14 PROBLEM — R53.83 FATIGUE: Status: RESOLVED | Noted: 2022-06-08 | Resolved: 2024-08-14

## 2024-08-14 PROBLEM — S09.90XA HEAD INJURY: Status: RESOLVED | Noted: 2022-08-12 | Resolved: 2024-08-14

## 2024-08-14 PROBLEM — K62.1 POLYP OF RECTUM: Status: RESOLVED | Noted: 2022-11-28 | Resolved: 2024-08-14

## 2024-08-14 PROBLEM — Z78.0 POSTMENOPAUSAL: Status: RESOLVED | Noted: 2023-08-08 | Resolved: 2024-08-14

## 2024-08-14 PROBLEM — Z86.0100 HISTORY OF COLON POLYPS: Status: RESOLVED | Noted: 2022-11-28 | Resolved: 2024-08-14

## 2024-08-14 PROBLEM — R53.1 WEAKNESS: Status: RESOLVED | Noted: 2022-05-29 | Resolved: 2024-08-14

## 2024-08-14 PROBLEM — Z12.11 SCREENING FOR COLON CANCER: Status: RESOLVED | Noted: 2022-11-28 | Resolved: 2024-08-14

## 2024-08-14 PROCEDURE — G0439 PPPS, SUBSEQ VISIT: HCPCS | Mod: ,,, | Performed by: NURSE PRACTITIONER

## 2024-08-14 RX ORDER — CYANOCOBALAMIN 1000 UG/ML
INJECTION, SOLUTION INTRAMUSCULAR; SUBCUTANEOUS
Qty: 1 ML | Refills: 11 | Status: SHIPPED | OUTPATIENT
Start: 2024-08-14

## 2024-08-14 NOTE — PATIENT INSTRUCTIONS
Counseling and Referral of Other Preventative  (Italic type indicates deductible and co-insurance are waived)    Patient Name: Victoria Delarosa  Today's Date: 8/14/2024    Health Maintenance       Date Due Completion Date    TETANUS VACCINE Never done ---    Hemoglobin A1c (Diabetic Prevention Screening) Never done ---    RSV Vaccine (Age 60+ and Pregnant patients) (1 - 1-dose 60+ series) Never done ---    COVID-19 Vaccine (4 - 2023-24 season) 09/01/2023 12/31/2021    Shingles Vaccine (2 of 2) 03/06/2024 1/10/2024    Pneumococcal Vaccines (Age 65+) (3 of 3 - PPSV23 or PCV20) 06/04/2024 6/4/2019    Influenza Vaccine (1) 09/01/2024 1/10/2024    Override on 12/31/2021: Done (HAD AT [facility])    Mammogram 06/04/2025 6/4/2024    DEXA Scan 08/10/2025 8/10/2023    Colorectal Cancer Screening 11/28/2027 11/28/2022    Lipid Panel 02/21/2029 2/21/2024        No orders of the defined types were placed in this encounter.    The following information is provided to all patients.  This information is to help you find resources for any of the problems found today that may be affecting your health:                  Living healthy guide: [facility] HospitalMobileProealth.gov    Understanding Diabetes: www.diabetes.org      Eating healthy: www.cdc.gov/healthyweight      [facility] home safety checklist: www.cdc.gov/steadi/patient.html      Agency on Aging: [facility].org    Alcoholics anonymous (AA): www.aa.org      Physical Activity: www.shannon.nih.gov/zc5sojq      Tobacco use: [facility]ubMobileProealth.gov      Counseling and Referral of Other Preventative  (Italic type indicates deductible and co-insurance are waived)    Patient Name: Victoria Delarosa  Today's Date: 8/14/2024    Health Maintenance       Date Due Completion Date    TETANUS VACCINE Never done ---    Hemoglobin A1c (Diabetic Prevention Screening) Never done ---    RSV Vaccine (Age 60+ and Pregnant patients) (1 - 1-dose 60+ series) Never done ---    COVID-19 Vaccine (4 - 2023-24 season)  09/01/2023 12/31/2021    Shingles Vaccine (2 of 2) 03/06/2024 1/10/2024    Pneumococcal Vaccines (Age 65+) (3 of 3 - PPSV23 or PCV20) 06/04/2024 6/4/2019    Influenza Vaccine (1) 09/01/2024 1/10/2024    Override on 12/31/2021: Done (HAD AT [facility])    Mammogram 06/04/2025 6/4/2024    DEXA Scan 08/10/2025 8/10/2023    Colorectal Cancer Screening 11/28/2027 11/28/2022    Lipid Panel 02/21/2029 2/21/2024        No orders of the defined types were placed in this encounter.    The following information is provided to all patients.  This information is to help you find resources for any of the problems found today that may be affecting your health:                  Living healthy guide: [facility] Hospit[facility].gov    Understanding Diabetes: www.diabetes.org      Eating healthy: www.cdc.gov/healthyweight      [facility] home safety checklist: www.cdc.gov/steadi/patient.html      Agency on Aging: [facility].org    Alcoholics anonymous (AA): www.aa.org      Physical Activity: www.shannon.nih.gov/nl6dsbv      Tobacco use: [facility] CenterFisionealth.gov

## 2024-08-14 NOTE — PROGRESS NOTES
"  Victoria Delarosa presented for a  Medicare AWV and comprehensive Health Risk Assessment today. The following components were reviewed and updated:    Medical history  Family History  Social history  Allergies and Current Medications  Health Risk Assessment  Health Maintenance  Care Team         ** See Completed Assessments for Annual Wellness Visit within the encounter summary.**         The following assessments were completed:  Living Situation  CAGE  Depression Screening  Timed Get Up and Go  Whisper Test  Cognitive Function Screening  Nutrition Screening  ADL Screening  PAQ Screening        Opioid Documentation    does not have a current opioid prescription.       Vitals:    08/14/24 0919 08/14/24 0949   BP: (!) 130/92 (!) 132/90   BP Location: Left arm Left arm   Patient Position: Sitting Sitting   BP Method: Large (Manual) Large (Manual)   Pulse: 70    Resp: 20    Temp: 98.5 °F (36.9 °C)    TempSrc: Oral    SpO2: 95%    Weight: 93.4 kg (206 lb)    Height: 5' 7" (1.702 m)      Body mass index is 32.26 kg/m².  Physical Exam  Vitals and nursing note reviewed.   Constitutional:       Appearance: She is well-developed.   HENT:      Head: Normocephalic and atraumatic.      Mouth/Throat:      Mouth: Mucous membranes are moist.   Cardiovascular:      Rate and Rhythm: Normal rate and regular rhythm.      Heart sounds: Normal heart sounds. No murmur heard.  Pulmonary:      Effort: Pulmonary effort is normal.      Breath sounds: Normal breath sounds.   Musculoskeletal:      Right lower leg: No edema.      Left lower leg: No edema.   Skin:     General: Skin is warm and dry.   Neurological:      Mental Status: She is alert and oriented to person, place, and time.   Psychiatric:         Behavior: Behavior normal.         Thought Content: Thought content normal.         Judgment: Judgment normal.               Diagnoses and health risks identified today and associated recommendations/orders:    1. Encounter for subsequent " annual wellness visit (AWV) in Medicare patient  Screenings performed as noted above. Personal preventative testing needs reviewed    2. Hypothyroidism, unspecified type  On synthroid  Stable on current regimen    3. Hypertension, unspecified type  Stable on current regimen    4. BMI 32.0-32.9,adult      5. Depressive disorder  Stable on zoloft  PHQ9 = 1    6. Hypertensive heart disease without heart failure  Followed by card at [facility] [place], Dr. Miguelito Reed    7. Coronary artery disease involving native coronary artery of native heart without angina pectoris  Stable. Followed by Cardiology    8. Sjogren syndrome with keratoconjunctivitis  Followed by Dr. Hayward and Dr. [place]    9. Thoracic aortic aneurysm without rupture, unspecified part  Per card notes    10. Benign neuroendocrine tumor of stomach  Per GI notes    11. Idiopathic esophageal varices without bleeding  Per GI  Recommended to repeat EGD in one year which is this month. Patient. Aware.    12. Chronic kidney disease, stage 3a  On cozaar - stable    13. History of falling  Last Fall was July 4th - Before that fell walking on sidewalk and fx left 5th metatarsal. Has been to Physical therapy, eval by card, had MRI of spine, hx of bilateral knee replacements, currently sees Dr. Hutchison - no specific etiology for frequent falls        Provided Iris with a 5-10 year written screening schedule and personal prevention plan. Recommendations were developed using the USPSTF age appropriate recommendations. Education, counseling, and referrals were provided as needed. After Visit Summary printed and given to patient which includes a list of additional screenings\tests needed.    Follow up in about 1 year (around 8/14/2025) for AWV Follow-up.      I offered to discuss advanced care planning, including how to pick a person who would make decisions for you if you were unable to make them for yourself, called a health care power of , and  what kind of decisions you might make such as use of life sustaining treatments such as ventilators and tube feeding when faced with a life limiting illness recorded on a living will that they will need to know. (How you want to be cared for as you near the end of your natural life)     X Patient is interested in learning more about how to make advanced directives.  I provided them paperwork and offered to discuss this with them.    Philly Gooden, ALEJANDRINAP

## 2024-08-14 NOTE — LETTER
AUTHORIZATION FOR RELEASE OF   CONFIDENTIAL INFORMATION    Dear Dr. [place],    We are seeing Victoria Delarosa, date of birth 1955, in the clinic at [facility]. Tim Burgos MD is the patient's PCP. Victoria Delarosa has an outstanding lab/procedure at the time we reviewed her chart. In order to help keep her health information updated, she has authorized us to request the following medical record(s):        (  )  MAMMOGRAM                                      (  )  COLONOSCOPY      (  )  PAP SMEAR                                          (  )  OUTSIDE LAB RESULTS     (  )  DEXA SCAN                                          (  X)  EYE EXAM   recent         (  )  FOOT EXAM                                          (  )  ENTIRE RECORD     (  )  OUTSIDE IMMUNIZATIONS                 (  )  _______________         Please fax records to Tim Burgos MD, [phone]     If you have any questions, please contact Philomena at [phone].           Patient Name: Victoria Delarosa  : 1955  Patient Phone #: [phone]        2024    Victoria Delarosa  [address] [id number]

## 2024-08-20 ENCOUNTER — LAB VISIT (OUTPATIENT)
Dept: LAB | Facility: CLINIC | Age: 69
End: 2024-08-20
Payer: MEDICARE

## 2024-08-20 DIAGNOSIS — N39.0 URINARY TRACT INFECTION WITH HEMATURIA, SITE UNSPECIFIED: Primary | ICD-10-CM

## 2024-08-20 DIAGNOSIS — R31.9 URINARY TRACT INFECTION WITH HEMATURIA, SITE UNSPECIFIED: Primary | ICD-10-CM

## 2024-08-20 LAB
BILIRUB SERPL-MCNC: NEGATIVE MG/DL
BLOOD URINE, POC: ABNORMAL
CLARITY, POC UA: ABNORMAL
COLOR, POC UA: YELLOW
GLUCOSE UR QL STRIP: NEGATIVE
KETONES UR QL STRIP: NEGATIVE
LEUKOCYTE ESTERASE URINE, POC: ABNORMAL
NITRITE, POC UA: NEGATIVE
PH, POC UA: 7
PROTEIN, POC: NEGATIVE
SPECIFIC GRAVITY, POC UA: 1.01
UROBILINOGEN, POC UA: 0.2

## 2024-08-20 PROCEDURE — 87186 SC STD MICRODIL/AGAR DIL: CPT | Mod: ,,, | Performed by: CLINICAL MEDICAL LABORATORY

## 2024-08-20 PROCEDURE — 87086 URINE CULTURE/COLONY COUNT: CPT | Mod: ,,, | Performed by: CLINICAL MEDICAL LABORATORY

## 2024-08-20 PROCEDURE — 87077 CULTURE AEROBIC IDENTIFY: CPT | Mod: ,,, | Performed by: CLINICAL MEDICAL LABORATORY

## 2024-08-20 PROCEDURE — 81003 URINALYSIS AUTO W/O SCOPE: CPT | Mod: RHCUB

## 2024-08-22 DIAGNOSIS — R10.11 RIGHT UPPER QUADRANT ABDOMINAL PAIN: ICD-10-CM

## 2024-08-22 LAB — UA COMPLETE W REFLEX CULTURE PNL UR: ABNORMAL

## 2024-08-22 NOTE — TELEPHONE ENCOUNTER
Notified patient Dr Burgos is sending in macrobid 100mg daily for 30 days for UTI. Patient verbalized understanding, and will  tomorrow. Instructed to call or return to clinic with any concerns.

## 2024-08-23 ENCOUNTER — PATIENT MESSAGE (OUTPATIENT)
Dept: OBSTETRICS AND GYNECOLOGY | Facility: CLINIC | Age: 69
End: 2024-08-23
Payer: MEDICARE

## 2024-08-23 RX ORDER — LEVOTHYROXINE SODIUM 25 UG/1
25 TABLET ORAL DAILY
Qty: 30 TABLET | Refills: 0 | Status: SHIPPED | OUTPATIENT
Start: 2024-08-23

## 2024-08-23 RX ORDER — OXYBUTYNIN CHLORIDE 5 MG/1
5 TABLET ORAL 2 TIMES DAILY
Qty: 60 TABLET | Refills: 1 | Status: SHIPPED | OUTPATIENT
Start: 2024-08-23 | End: 2025-08-23

## 2024-08-23 RX ORDER — SPIRONOLACTONE 25 MG/1
25 TABLET ORAL DAILY
Qty: 30 TABLET | Refills: 0 | Status: SHIPPED | OUTPATIENT
Start: 2024-08-23

## 2024-08-29 ENCOUNTER — PROCEDURE VISIT (OUTPATIENT)
Dept: VASCULAR SURGERY | Facility: CLINIC | Age: 69
End: 2024-08-29
Attending: FAMILY MEDICINE
Payer: MEDICARE

## 2024-08-29 VITALS
BODY MASS INDEX: 32.05 KG/M2 | HEIGHT: 67 IN | RESPIRATION RATE: 16 BRPM | DIASTOLIC BLOOD PRESSURE: 92 MMHG | SYSTOLIC BLOOD PRESSURE: 128 MMHG | WEIGHT: 204.19 LBS | HEART RATE: 69 BPM

## 2024-08-29 DIAGNOSIS — M79.604 LEG PAIN, BILATERAL: ICD-10-CM

## 2024-08-29 DIAGNOSIS — M79.605 LEG PAIN, BILATERAL: ICD-10-CM

## 2024-08-29 DIAGNOSIS — I87.2 VENOUS INSUFFICIENCY: Primary | ICD-10-CM

## 2024-08-29 DIAGNOSIS — Z79.01 CURRENT USE OF LONG TERM ANTICOAGULATION: ICD-10-CM

## 2024-08-29 DIAGNOSIS — I87.2 VENOUS INSUFFICIENCY (CHRONIC) (PERIPHERAL): ICD-10-CM

## 2024-08-29 PROCEDURE — 99999PBSHW PR PBB SHADOW TECHNICAL ONLY FILED TO HB: Mod: PBBFAC,,,

## 2024-08-29 PROCEDURE — 36465 NJX NONCMPND SCLRSNT 1 VEIN: CPT | Mod: PBBFAC | Performed by: FAMILY MEDICINE

## 2024-08-29 PROCEDURE — 27000930 HC PROCEDURE PACK, MEDLINE

## 2024-08-29 PROCEDURE — 85610 PROTHROMBIN TIME: CPT | Performed by: FAMILY MEDICINE

## 2024-08-29 PROCEDURE — 36465 NJX NONCMPND SCLRSNT 1 VEIN: CPT | Mod: S$PBB,RT,, | Performed by: FAMILY MEDICINE

## 2024-08-29 PROCEDURE — C1894 INTRO/SHEATH, NON-LASER: HCPCS

## 2024-08-29 RX ORDER — LIDOCAINE HYDROCHLORIDE 10 MG/ML
1.5 INJECTION, SOLUTION INFILTRATION; PERINEURAL ONCE
Status: COMPLETED | OUTPATIENT
Start: 2024-08-29 | End: 2024-08-29

## 2024-08-29 RX ORDER — LIDOCAINE HYDROCHLORIDE 10 MG/ML
2 INJECTION, SOLUTION INFILTRATION; PERINEURAL ONCE
Status: DISCONTINUED | OUTPATIENT
Start: 2024-08-30 | End: 2024-08-30 | Stop reason: HOSPADM

## 2024-08-29 RX ADMIN — POLIDOCANOL 5 ML: KIT at 09:08

## 2024-08-29 RX ADMIN — LIDOCAINE HYDROCHLORIDE 1.5 ML: 10 INJECTION, SOLUTION INFILTRATION; PERINEURAL at 08:08

## 2024-08-29 NOTE — PATIENT INSTRUCTIONS
Pre Prodedure Information    Procedure (s) and Date (s):  1. LT GSV VenaSeal Ablation 08/30/2024 @ 9:00 a.m.                                                2. LT SSV VenaSeal Ablation 09/06/2024  Do not eat or drink anything after midnight.  You may take medications Synthroid and Losartan with a small sip of water morning of procedure.  We will call you the day prior with your time to report for surgery.  You will check in at Ambulatory Surgery.    Shower prior to procedure as your leg will be wrapped for 48 hours and you will not be able to shower.  Do not wear lotion, oil, or cream on you legs on the day of your procedure.  This will cause the Doctor's nnamdi to fade.  Wear shorts, skirt, or loose pants and larger shoes (house shoes).   Bring a pillow or two and leave in the car (to prop your leg).  Bring someone with you to drive, stay during the procedure, and drive you home.  Someone will also need to stay with you at home the first night.  No driving for 24 hours after sedation.  Prepare to walk 15 minutes out of each hour for the first 48 hours post op.  Prepare to keep your legs propped up while sitting for the first 48 hours or 2 days following your procedure (recliner, couch, or chair).  Dressings will remain on your legs for at least 48 hours or 2 days after procedure.  Full discharge instructions will be provided on the day of your procedure.  Typically, you are in and out within a few hours.  We will follow you postoperatively with a 4 day post ablation ultrasound and then a 1 month, 3 month, 6 month, and 1 year post ablation visit with the physician or nurse practitioner with or without an ultrasound.  Please make every effort to attend these appointments as they will be essential to following you progress.  Please kindly notify us as soon as possible if you need to reschedule your appointment.  As always, we look forward to caring for you and are happy to answer your questions.  Please call us at  [phone].     Vein Procedure Discharge Instructions and post op appointment: Tuesday Sept.3,2024 @ 8:30 a.m.    For the first 48 hours (2 days) following your procedure:  Walk 15 minutes on every hour (while awake).  Keep leg propped up (recliner, couch, etc.) while sitting.  Take pain medication, if prescribed.  Take Acetaminophen for the next 2-3 days with an over-the-counter anti-acid (Prilosec, Protonix, Nexium, etc.) if needed.    After your 48 hours (2 days) Post Op period complete:  Remove dressings, throw away all except the ACE wrap, keep the ACE wrap.  Shower using warm soapy water.  Use separate wash cloth on the treated leg.  No baths for 2 weeks.  Compression must be worn daily for 2 weeks.   Your small saphenous vein was ablated wear compression only to knee   With Varithena (foam), compression must be worn day and night.  Walk for at least 10 minutes daily for the next month.    Activity:  If you have a normal ultrasound one week after your ablation:  You may resume walking activities immediately.  You may resume jogging 2 weeks after your procedure.  You may resume swimming 2 weeks after your procedure.  If you had microphlebectomies, you must make sure all areas are completely healed before swimming.  You may resume lower body weight lifting 2 weeks after your procedure.  You may resume upper body weight lifting while sitting down 2 days after your procedure.  You may resume sexual activities 2 weeks after your procedure.  You may fly commercially 2 weeks after your procedure.  You may scuba dive 1 month after your procedure.    Pilots - You may not fly for 1 month after your procedure.  You must have a normal 1 month post op ultrasound before returning to flight status.

## 2024-08-29 NOTE — PROCEDURES
Date: 8/29/24  Surgeon: Dr Misael Hutchison DO    Procedure:  Non thermal ablation of the right distal great saphenous vein under ultrasound guidance using Varithena non compounded foam Sclerosant 1%.    Anesthesia:  Local with monitored anesthesia care.  Estimated blood loss: 2 cc.  Specimens removed:  None.  Complications:  None.  Implants or grafts:  None.    Findings:  A total of 5 cc of Varithena non compound and foam sclerosant 1%, was used to treat the right distal great saphenous vein.  Maximum diameter of the vein treated was 3.0 mm with a maximum reflux time of 1.8 seconds.    Procedure in detail:  After patient identification, surgical site verification and marking of the symptomatic right leg, the patient was taken to the procedure room and placed in the supine position.  Monitored anesthesia care was induced.  The patient was prepped and draped in the normal sterile fashion leaving the right leg exposed.  A time-out was performed, identifying the patient and the correct surgical site.  Local 1% lidocaine cutaneous anesthetic was infiltrated at the site of expected cannulation of the right distal great saphenous vein.  A 5 Amharic micropuncture introducer kit was used to gain venous access.  The patient's leg was then elevated approximately 45° above the horizontal and held in that position for 10 minutes to aid in the exsanguination of blood from the treatment area.  Using ultrasound guidance I administered 5 cc at a time of Varithena non compounded a foam sclerosant through the access site into the saphenous varicosity and multiple adjacent varicosities.  Evacuation of blood from the target veins was improved by manipulation of the ultrasound probe which also helped to improve vein filling with the medication.  The volume, rate and flow of the medication was determined by using ultrasound appearance of the saphenous vein system filling with the medication and cathi.  The previously mapping treated  veins were scanned extensively with ultrasound to confirm vascular spasm.  After this was done, the catheter and sheath were removed and noted to be intact.  Completion ultrasound revealed a widely patent popliteal/posterior tibial vein and an ablated distal great saphenous vein.  The patient's leg was kept in elevation of approximately 45° above the horizontal and held in that position for approximately 10 minutes while compression pads were placed over the overlying course of the treated veins and short stretch bandages were applied over the treated area.  The patient was transferred to the recovery room in stable condition.

## 2024-08-30 ENCOUNTER — ANESTHESIA EVENT (OUTPATIENT)
Dept: PAIN MEDICINE | Facility: HOSPITAL | Age: 69
End: 2024-08-30
Payer: MEDICARE

## 2024-08-30 ENCOUNTER — ANESTHESIA (OUTPATIENT)
Dept: PAIN MEDICINE | Facility: HOSPITAL | Age: 69
End: 2024-08-30
Payer: MEDICARE

## 2024-08-30 ENCOUNTER — HOSPITAL ENCOUNTER (OUTPATIENT)
Facility: HOSPITAL | Age: 69
Discharge: HOME | End: 2024-08-30
Attending: FAMILY MEDICINE | Admitting: FAMILY MEDICINE
Payer: MEDICARE

## 2024-08-30 VITALS
BODY MASS INDEX: 32.77 KG/M2 | HEART RATE: 71 BPM | RESPIRATION RATE: 16 BRPM | SYSTOLIC BLOOD PRESSURE: 143 MMHG | OXYGEN SATURATION: 97 % | WEIGHT: 208.81 LBS | DIASTOLIC BLOOD PRESSURE: 96 MMHG | HEIGHT: 67 IN | TEMPERATURE: 98 F

## 2024-08-30 DIAGNOSIS — I87.2 VENOUS INSUFFICIENCY: ICD-10-CM

## 2024-08-30 DIAGNOSIS — M79.605 LEG PAIN, BILATERAL: Primary | ICD-10-CM

## 2024-08-30 DIAGNOSIS — M79.604 LEG PAIN, BILATERAL: Primary | ICD-10-CM

## 2024-08-30 DIAGNOSIS — I87.2 VENOUS INSUFFICIENCY: Primary | ICD-10-CM

## 2024-08-30 DIAGNOSIS — I87.2 VENOUS INSUFFICIENCY (CHRONIC) (PERIPHERAL): Primary | ICD-10-CM

## 2024-08-30 PROCEDURE — 36482 ENDOVEN THER CHEM ADHES 1ST: CPT | Mod: LT,,, | Performed by: FAMILY MEDICINE

## 2024-08-30 PROCEDURE — 63600175 PHARM REV CODE 636 W HCPCS: Performed by: NURSE ANESTHETIST, CERTIFIED REGISTERED

## 2024-08-30 PROCEDURE — 27201423 OPTIME MED/SURG SUP & DEVICES STERILE SUPPLY: Performed by: FAMILY MEDICINE

## 2024-08-30 PROCEDURE — 37000008 HC ANESTHESIA 1ST 15 MINUTES: Performed by: FAMILY MEDICINE

## 2024-08-30 PROCEDURE — 37000009 HC ANESTHESIA EA ADD 15 MINS: Performed by: FAMILY MEDICINE

## 2024-08-30 PROCEDURE — C1894 INTRO/SHEATH, NON-LASER: HCPCS | Performed by: FAMILY MEDICINE

## 2024-08-30 PROCEDURE — 25000003 PHARM REV CODE 250: Performed by: NURSE ANESTHETIST, CERTIFIED REGISTERED

## 2024-08-30 PROCEDURE — 27000284 HC CANNULA NASAL: Performed by: NURSE ANESTHETIST, CERTIFIED REGISTERED

## 2024-08-30 PROCEDURE — 25000003 PHARM REV CODE 250: Performed by: FAMILY MEDICINE

## 2024-08-30 PROCEDURE — PAINCODE PAIN MANAGEMENT PROCEDURE PLACEHOLDER CHARGE: Performed by: FAMILY MEDICINE

## 2024-08-30 PROCEDURE — 27000716 HC OXISENSOR PROBE, ANY SIZE: Performed by: NURSE ANESTHETIST, CERTIFIED REGISTERED

## 2024-08-30 RX ORDER — LIDOCAINE HYDROCHLORIDE 20 MG/ML
INJECTION, SOLUTION EPIDURAL; INFILTRATION; INTRACAUDAL; PERINEURAL
Status: DISCONTINUED | OUTPATIENT
Start: 2024-08-30 | End: 2024-08-30

## 2024-08-30 RX ORDER — PROPOFOL 10 MG/ML
VIAL (ML) INTRAVENOUS CONTINUOUS PRN
Status: DISCONTINUED | OUTPATIENT
Start: 2024-08-30 | End: 2024-08-30

## 2024-08-30 RX ORDER — SODIUM CHLORIDE 9 MG/ML
INJECTION, SOLUTION INTRAVENOUS CONTINUOUS
Status: DISCONTINUED | OUTPATIENT
Start: 2024-08-30 | End: 2024-08-30 | Stop reason: HOSPADM

## 2024-08-30 RX ORDER — LIDOCAINE HYDROCHLORIDE 10 MG/ML
2 INJECTION, SOLUTION INFILTRATION; PERINEURAL ONCE
Status: COMPLETED | OUTPATIENT
Start: 2024-09-06 | End: 2024-08-30

## 2024-08-30 RX ADMIN — PROPOFOL 100 MCG/KG/MIN: 10 INJECTION, EMULSION INTRAVENOUS at 12:08

## 2024-08-30 RX ADMIN — SODIUM CHLORIDE: 9 INJECTION, SOLUTION INTRAVENOUS at 12:08

## 2024-08-30 RX ADMIN — LIDOCAINE HYDROCHLORIDE 70 MG: 20 INJECTION, SOLUTION EPIDURAL; INFILTRATION; INTRACAUDAL; PERINEURAL at 12:08

## 2024-08-30 NOTE — ANESTHESIA POSTPROCEDURE EVALUATION
Anesthesia Post Evaluation    Patient: Victoria Delarosa    Procedure(s) Performed: Procedure(s) (LRB):  Left GSV VenaSeal Ablation (Left)    Final Anesthesia Type: MAC      Patient participation: Yes- Able to Participate  Level of consciousness: awake and alert  Post-procedure vital signs: reviewed and stable  Pain management: adequate  Airway patency: patent    PONV status at discharge: No PONV  Anesthetic complications: no      Cardiovascular status: blood pressure returned to baseline, hemodynamically stable and stable  Respiratory status: unassisted  Hydration status: euvolemic  Follow-up not needed.  Comments: Refer to nursing notes for pain/zachery score upon discharge from recovery              Vitals Value Taken Time   /92 08/30/24 1347   Temp 36.4 °C (97.6 °F) 08/30/24 1309   Pulse 65 08/30/24 1349   Resp 16 08/30/24 1349   SpO2 98 % 08/30/24 1349   Vitals shown include unfiled device data.      Event Time   Out of Recovery 13:48:00         Pain/Zachery Score: Zachery Score: 10 (8/30/2024  1:45 PM)

## 2024-08-30 NOTE — TRANSFER OF CARE
"Anesthesia Transfer of Care Note    Patient: Victoria Delarosa    Procedure(s) Performed: Procedure(s) (LRB):  Left GSV VenaSeal Ablation (Left)    Patient location: Other:    Anesthesia Type: MAC    Transport from OR: Transported from OR on room air with adequate spontaneous ventilation    Post pain: adequate analgesia    Post assessment: no apparent anesthetic complications    Post vital signs: stable    Level of consciousness: sedated and responds to stimulation    Nausea/Vomiting: no nausea/vomiting    Complications: none    Transfer of care protocol was followedComments: Good SV continue, NAD, VSS, RTRN      Last vitals: Visit Vitals  BP (!) 108/53 (BP Location: Right arm, Patient Position: Lying)   Pulse 86   Temp 36.4 °C (97.6 °F) (Oral)   Resp 16   Ht 5' 7" (1.702 m)   Wt 94.7 kg (208 lb 12.8 oz)   SpO2 97%   BMI 32.70 kg/m²     "

## 2024-08-30 NOTE — H&P
Patient ID: Victoria Delarosa is a 68 y.o. female.     I. HISTORY      Chief Complaint:        Chief Complaint   Patient presents with    Follow-up       US YVROSE COMP RESULTS         HPI: Victoria Delarosa is a 68 y.o. female who presents for follow-up and results to a bilateral complete venous reflux study, today 07/09/2024, and the results were discussed with the patient.  This study shows no acute DVT bilaterally.  There post thrombotic changes within the left popliteal vein as well as deep venous reflux.  The study also shows a well ablated proximal right great and right small saphenous veins.  Reflux is noted within the distal right great saphenous vein, left great saphenous vein and left small saphenous vein.     The patient was initially seen on 06/04/2024 by self referral for evaluation of  bilateral lower extremity edema and pain.  Symptoms are  persistent/worsening and began  greater than 5 years ago.  Location is bilateral  lower extremities below the knees. Symptoms are  worse at the end of the day.  History of venous interventions includes  vein procedures performed bilaterally by a physician in [facility] several years ago.    Denies family history of venous disease.   Patient also admits to history of left popliteal DVT and bilateral Pes about 4 years ago for which she was placed on warfarin and remains on it today.  Last INR was 1.8, she checks this at home.  The patient states she is worn compression stockings over the past 4 years, ever since her DVT and previous diagnosis of venous insufficiency.  She also walks regularly for exercise and elevate her legs when appropriate.  She feels despite these conservative measures her symptoms have worsened over the past several years and she would like to have underlying condition corrected if possible.     Venous Disease Medical Necessity Documentation Initial Visit Date:  06/04/2024 Return Check Date:    Have you ever had a rupture or bleed from a varicose vein  in your leg(s)?              [] Yes  [x] No   [] Yes   [] No   Have you ever been diagnosed with phlebitis, cellulitis, or inflammation in the area of the varicose veins of  your leg(s)? PE, DVT  [x] Yes  [] No    [] Yes   [] No   Do you have darkened or inflamed skin on your legs?   [x] Yes   [] No   [] Yes   [] No   Do you have leg swelling?      [x] Yes   [] No   [] Yes   [] No   Do you have leg pain?   [x] Yes   [] No   [] Yes   [] No   If yes, describe the type of pain?    [x]   Stabbing [x]  Radiating [x]  Aching   [x]  Tightness [x]  Throbbing               [x]  Burning [x]  Cramping               Do you have leg discomfort?   [x] Yes   [] No   [] Yes   [] No   If yes, describe the type of discomfort?    [x]  Heaviness []  Fullness   [x]  Restlessness [x] Tired/Fatigued [] Itching               Have you ever worn compression hose?   [x] Yes   [] No   [] Yes   [] No   If yes, how long?    1 1/2 TO 2 YEARS        Do you elevate your legs while sitting?   [x] Yes   [] No   [] Yes   [] No   Does venous disease (varicose veins, ulcers, skin changes, leg pain/swelling) interfere with your daily life?  If yes, check activities you are limited or unable to do.     []  Shower  [x]   Walk  [x]  Exercise  [x] Play with children/grandchildren  [x]  Shop [] Work [x] Stand for any period of time [x] Sleep                               [x] Sitting for an extended period of time.              [x] Yes   [] No   [] Yes   [] No   Do you exercise/have you tried to exercise (i.e.  Walk our participate in a regular exercise routine)?  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    BMI 32.0-32.9,adult      Bronchitis      CHF (congestive heart failure)      Cobalamin deficiency      Constipation 05/22/2023    Contusion of left knee 08/12/2022     Contusion of right knee 08/12/2022    Deep vein thrombosis      Depressive disorder      Diarrhea 12/07/2022    Fall 08/12/2022    General anesthetics causing adverse effect in therapeutic use      GERD (gastroesophageal reflux disease)      Head injury 08/12/2022    Heart attack       20 yrs ago    Hypertension      Hypothyroidism      Lower extremity edema      Neuropathy      Osteoarthritis of both knees      Pain in left leg      Pain in right leg      Peripheral edema      Pulmonary embolism 2017    Stroke      Varicose veins of bilateral lower extremities with pain      Vitamin D deficiency                 Past Surgical History:   Procedure Laterality Date    BILATERAL TUBAL LIGATION        CARDIAC CATHETERIZATION        COLONOSCOPY        EYE SURGERY        knee scope Left      THYROIDECTOMY, PARTIAL        TONSILLECTOMY, ADENOIDECTOMY        TOTAL KNEE ARTHROPLASTY Bilateral      TOTAL KNEE ARTHROPLASTY Right      TUBAL LIGATION        VAGINAL DELIVERY         x 3         Tobacco Use History   Social History           Tobacco Use   Smoking Status Never    Passive exposure: Past (brothers and sisters.)   Smokeless Tobacco Never              Current Medications      Current Outpatient Medications:     cetirizine (ZYRTEC) 10 MG tablet, Take 10 mg by mouth once daily., Disp: , Rfl:     ezetimibe (ZETIA) 10 mg tablet, Take 10 mg by mouth once daily., Disp: , Rfl:     furosemide (LASIX) 20 MG tablet, Take 1 tablet (20 mg total) by mouth once daily., Disp: 90 tablet, Rfl: 1    gabapentin (NEURONTIN) 100 MG capsule, Take 100 mg by mouth every evening., Disp: , Rfl:     levothyroxine (SYNTHROID) 25 MCG tablet, Take 1 tablet (25 mcg total) by mouth once daily., Disp: 90 tablet, Rfl: 1    losartan (COZAAR) 50 MG tablet, Take 1 tablet (50 mg total) by mouth once daily. (Patient taking differently: Take 50 mg by mouth once daily. Taking 1/2 tablet daily), Disp: 90 tablet, Rfl: 1    magnesium oxide (MAG-OX) 400 mg (241.3  mg magnesium) tablet, Take 1 tablet by mouth once daily., Disp: , Rfl:     oxybutynin (DITROPAN) 5 MG Tab, TAKE 1 TABLET (5 MG TOTAL) BY MOUTH 2 (TWO) TIMES DAILY., Disp: 180 tablet, Rfl: 2    potassium chloride (KLOR-CON) 10 MEQ TbSR, Take 1 tablet (10 mEq total) by mouth once daily., Disp: 90 tablet, Rfl: 1    ranolazine (RANEXA) 500 MG Tb12, Take 500 mg by mouth 2 (two) times daily., Disp: , Rfl:     sertraline (ZOLOFT) 100 MG tablet, Take 1 tablet (100 mg total) by mouth once daily., Disp: 90 tablet, Rfl: 1    spironolactone (ALDACTONE) 25 MG tablet, Take 1 tablet (25 mg total) by mouth once daily., Disp: 90 tablet, Rfl: 1    warfarin (COUMADIN) 5 MG tablet, Take 5 mg by mouth Daily., Disp: , Rfl:     cyanocobalamin 1,000 mcg/mL injection, INJECT 1 ML EVERY MONTH, Disp: 1 mL, Rfl: 11        Review of Systems   Constitutional:  Negative for activity change, chills, diaphoresis, fatigue and fever.   Respiratory:  Negative for cough and shortness of breath.    Cardiovascular:  Positive for leg swelling. Negative for chest pain and claudication.        Hyperpigmentation LE   Gastrointestinal:  Negative for nausea and vomiting.   Musculoskeletal:  Positive for leg pain. Negative for joint swelling.   Integumentary:  Negative for rash and wound.   Neurological:  Negative for weakness and numbness.            II. PHYSICAL EXAM      Physical Exam  Constitutional:       General: She is awake. She is not in acute distress.     Appearance: Normal appearance. She is obese. She is not ill-appearing or toxic-appearing.   HENT:      Head: Normocephalic and atraumatic.   Eyes:      Extraocular Movements: Extraocular movements intact.      Conjunctiva/sclera: Conjunctivae normal.      Pupils: Pupils are equal, round, and reactive to light.   Neck:      Vascular: No carotid bruit or JVD.   Cardiovascular:      Rate and Rhythm: Normal rate and regular rhythm.      Pulses:           Dorsalis pedis pulses are detected w/ Doppler on  the right side and detected w/ Doppler on the left side.        Posterior tibial pulses are detected w/ Doppler on the right side and detected w/ Doppler on the left side.      Heart sounds: No murmur heard.  Pulmonary:      Effort: Pulmonary effort is normal. No respiratory distress.      Breath sounds: No stridor. No wheezing, rhonchi or rales.   Musculoskeletal:         General: No swelling, tenderness or deformity.      Right lower le+ Edema present.      Left lower le+ Edema present.      Comments:  No evidence of open ulceration, weeping or cellulitis bilaterally.   Feet:      Comments:   Triphasic hand-held dopplerable pulses of the bilateral dorsalis pedis and posterior tibial arteries.  Skin:     General: Skin is warm.      Capillary Refill: Capillary refill takes less than 2 seconds.      Coloration: Skin is not ashen.      Findings: No bruising, erythema, lesion, rash or wound.   Neurological:      Mental Status: She is alert and oriented to person, place, and time.      Motor: No weakness.   Psychiatric:         Speech: Speech normal.         Behavior: Behavior normal. Behavior is cooperative.            Reticular/Spider veins noted:  RLE: anterior calf, medial calf, posterior calf, and lateral calf  LLE: anterior calf, medial calf, posterior calf, and lateral calf     Varicose veins noted:  RLE: none  LLE:  none     CEAP Classification  Clinical Signs: Class 4 - Skin changes ascribed to venous disease  Etiologic Classification: Primary  Anatomic distribution: Superficial  Pathophysiologic dysfunction: Reflux                               Venous Clinical Severity Score  Pain:2=Daily, moderate activity limitation, occasional analgesics  Varicose Veins: 1=Few, scattered. Branch varicose veins  Venous Edema: 2=Afternoon edema, above ankle  Pigmentation: 0=None or focal, low intensity (tan)  Inflammation: 0=None  Induration: 0=None  Number of Active Ulcers: 0=0  Active Ulceration, Duration:  0=None  Active Ulcer Size: 0=None  Compressive Therapy: 3=Full compliance, stockings + elevation  Total Score: 8        III. ASSESSMENT & PLAN (MEDICAL DECISION MAKING)      1. Venous insufficiency    2. Leg pain, bilateral    3. Edema, lower extremity          Assessment/Diagnosis and Plan:  The patient continues to have sequela of chronic venous insufficiency despite over 3 months of conservative therapy. The patient continues to have life altering symptoms as well as a positive reflux study as noted in the history of present illness above. At this time I believe it would be reasonable to proceed with a left great saphenous and left small saphenous vein non thermal adhesive endovenous ablation for symptomatic venous insufficiency. Untreated venous disease increases the patient's risk for cellulitis, deep vein thrombosis, chronic skin changes and venous ulceration.  Risk and benefits of the procedure were explained to the patient and the patient wishes to proceed. The patient does have a history of DVT and will remain on her long-term chronic anticoagulation with warfarin.  We will check an INR the day of the procedure.     The safety of our patients is our first concern, therefore vein procedures performed on the same lower extremity within the same 90 day episode of care will need to be performed on separate days due to our use of monitored anesthesia care where in the patient is sedated and unable to be moved from a supine to a prone position.      Chris Hutchison,

## 2024-08-30 NOTE — OP NOTE
Date: 8/30/24   Surgeon: Dr Misael Hutchison DO    Procedure: Endovenous Adhesive Ablation of the left Greater Saphenous Vein     Estimated blood loss: 3 cc.  Specimens removed:  None.  Complications:  None.  Implants or grafts:  None.    Findings: Treatment Length  59 (cm):  Maximum diameter of the vein treated 6.7 mm with a maximum reflux time of 3.4 seconds.      Pre-Procedure: Patient's vital signs and informed consent were obtained. Patient history was checked and updated with any changes since the last visit. The patient continues to present with symptoms of venous disease as proven via DUS Venous Insufficiency exam completed prior to procedure. A complete Time Out was performed; with all parties present, which verified patient's identification, intended procedure, correct procedure site, and all equipment/supplies needed to complete the procedure.     Procedure: Ultrasound guidance was used to nnamdi the target vein with the patient positioned on the treatment table. The entire lower extremity was prepped with a 50/50 % solution of isopropyl alcohol and chlorhexidine; then sterile drapes were applied. Once the desired access point was identified; less than 5mL of 0.5% Lidocaine buffered with Sodium Bicarbonate was distributed, to comfortably gain access in real-time with ultrasound guidance. A guidewire was used as necessary, which allowed insertion of the blue introduction catheter. Once positioned, the delivery catheter was prepped and inserted into the introducer sheath. The delivery catheter position was confirmed via ultrasound 5cm distal to Saphenofemoral junction. Following the IFU, adhesive was delivered, segmentally, into the target vein. Ultrasound visualization confirmed successful treatment of the targeted vein with no evidence of complications at the junction. All devices were then removed, and hemostasis was achieved with a suture. Dressings with compression were applied to the access site and to  any puncture sites requiring them. There was minimal blood loss.

## 2024-08-30 NOTE — PLAN OF CARE
REFER TO WRITTEN DOCUMENT AND RECOVERY INFORMATION.    D/CD PATIENT VIAA WHEELCHAIR AT 1400.    INFORMED PATIENT IF UNABLE TO VOID IN 8 HOURS, GO TO ER. NOTIFY MD OF REDNESS OR DRAINAGE FROM INJECTION SITE OR FEVER OVER 3-4 DAY. REST AND DRINK PLENTY OF FLUIDS FOR THE REMAINDER OF THE DAY. NO LIFTING OVER 5 LBS FOR THE REMAINDER OF THE DAY. CONTINUE REGULAR MEDICATIONS AS PRESCRIBED. MAY TAKE PAIN MEDICATION AS PRESCRIBED.     PAIN IMPROVED  100%  PRE OP PAIN 5.  POSTOP PAIN 0.

## 2024-08-30 NOTE — DISCHARGE SUMMARY
[facility] - Pain Management  Discharge Note  Short Stay    Procedure(s) (LRB):  Left GSV VenaSeal Ablation (Left)      OUTCOME: Patient tolerated treatment/procedure well without complication and is now ready for discharge.    DISPOSITION: Home or Self Care    FINAL DIAGNOSIS:  Venous insufficiency (chronic) (peripheral)    FOLLOWUP: In clinic    DISCHARGE INSTRUCTIONS:  No discharge procedures on file.     TIME SPENT ON DISCHARGE: 5 minutes

## 2024-08-30 NOTE — DISCHARGE INSTRUCTIONS
Vein Procedure Discharge Instructions    Today:  Someone must stay with you tonight.  No alcohol for at least 8 hours after your procedure.  No driving for 24 hours after your procedure if you receive sedation/anesthesia.    For the first 48 hours (2 days) following your procedure:  Walk 15 minutes on every hour (while awake).  Keep leg propped up (recliner, couch, etc.) while sitting.  Take pain medication, if prescribed.  Take Ibuprofen, or Acetaminophen, for the next 2-3 days with an over-the-counter anti-acid (Prilosec, Protonix, Nexium, etc.)    After your 48 hours (2 days) Post Op period complete:  Remove dressings, throw away all except the ACE wrap, keep the ACE wrap.  Shower using warm soapy water.  Use separate wash cloth on the treated leg.  No baths for 2 weeks.  Compression up to groin must be worn daily for 2 weeks.  You may use the ACE wrap for entire leg, or compression hose to knees and ACE wrap to groin.  (If you had your small saphenous vein ablated, then compression only to knee)  If your vein was ablated with Varithena (foam), compression must be worn day and night.  Walk for at least 10 minutes daily for the next month.    Activity:  If you have a normal ultrasound one week after your ablation:  You may resume walking activities immediately.  You may resume jogging 2 weeks after your procedure.  You may resume swimming 2 weeks after your procedure.  If you had microphlebectomies, you must make sure all areas are completely healed before swimming.  You may resume lower body weight lifting 2 weeks after your procedure.  You may resume upper body weight lifting while sitting down 2 days after your procedure.  You may resume sexual activities 2 weeks after your procedure.  You may fly commercially 2 weeks after your procedure.  You may scuba dive 1 month after your procedure.    Pilots - You may not fly for 1 month after your procedure.  You must have a normal 1 month post op ultrasound before  returning to flight status.    Your follow-up appointment is: _________________________________________________________    Please call our office at [phone] with any questions or concerns.  You may call the following number for after hour and weekend concerns: [phone] (Dr. Hutchison).    Patient Signature ______________________________________Date/time_______________________

## 2024-08-30 NOTE — ANESTHESIA PREPROCEDURE EVALUATION
08/30/2024  Victoria Delarosa is a 68 y.o., female.    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    BMI 32.0-32.9,adult     Bronchitis     CHF (congestive heart failure)     Cobalamin deficiency     Constipation 05/22/2023    Contusion of left knee 08/12/2022    Contusion of right knee 08/12/2022    Deep vein thrombosis     Depressive disorder     Diarrhea 12/07/2022    Fall 08/12/2022    General anesthetics causing adverse effect in therapeutic use     GERD (gastroesophageal reflux disease)     Head injury 08/12/2022    Heart attack     20 yrs ago    Hypertension     Hypothyroidism     Lower extremity edema     Neuropathy     Osteoarthritis of both knees     Pain in left leg     Pain in right leg     Peripheral edema     Pulmonary embolism 2017    Stroke     Varicose veins of bilateral lower extremities with pain     Vitamin D deficiency        Past Surgical History:   Procedure Laterality Date    BILATERAL TUBAL LIGATION      CARDIAC CATHETERIZATION      COLONOSCOPY      EYE SURGERY      knee scope Left     SCLEROTHERAPY WITH ULTRASOUND GUIDANCE Right 08/29/2024    Right Distal GSV Varithena Ablation performed by Dr. Misael Hutchison    THYROIDECTOMY, PARTIAL      TONSILLECTOMY, ADENOIDECTOMY      TOTAL KNEE ARTHROPLASTY Bilateral     TOTAL KNEE ARTHROPLASTY Right     TUBAL LIGATION      VAGINAL DELIVERY      x 3       Family History   Problem Relation Name Age of Onset    Hypertension Mother      Heart disease Mother      Liver disease Father      Arthritis Sister      Heart disease Sister      Arthritis Brother      Heart disease Brother      Stroke Brother      No Known Problems Brother      No Known Problems Daughter      Drug abuse Son      No Known Problems Son         Social History      Socioeconomic History    Marital status:     Number of children: 3   Occupational History    Occupation: retired   Tobacco Use    Smoking status: Never     Passive exposure: Past (brothers and sisters.)    Smokeless tobacco: Never   Substance and Sexual Activity    Alcohol use: Never    Drug use: Never    Sexual activity: Yes     Partners: Male     Birth control/protection: Post-menopausal, Surgical     Comment: tubal     Social Determinants of Health     Financial Resource Strain: Medium Risk (8/14/2024)    Overall Financial Resource Strain (CARDIA)     Difficulty of Paying Living Expenses: Somewhat hard   Food Insecurity: No Food Insecurity (8/14/2024)    Hunger Vital Sign     Worried About Running Out of Food in the Last Year: Never true     Ran Out of Food in the Last Year: Never true   Transportation Needs: No Transportation Needs (8/14/2024)    PRAPARE - Transportation     Lack of Transportation (Medical): No     Lack of Transportation (Non-Medical): No   Physical Activity: Insufficiently Active (8/14/2024)    Exercise Vital Sign     Days of Exercise per Week: 2 days     Minutes of Exercise per Session: 20 min   Stress: No Stress Concern Present (8/14/2024)    [employer] - Occupational Stress Questionnaire     Feeling of Stress : Only a little   Housing Stability: Low Risk  (8/14/2024)    Housing Stability Vital Sign     Unable to Pay for Housing in the Last Year: No     Homeless in the Last Year: No       Current Facility-Administered Medications   Medication Dose Route Frequency Provider Last Rate Last Admin    0.9%  NaCl infusion   Intravenous Continuous Chris Hutchison DO        LIDOcaine HCL 10 mg/ml (1%) injection 2 mL  2 mL Other Once Chris Hutchison DO        [START ON 9/6/2024] LIDOcaine HCL 10 mg/ml (1%) injection 2 mL  2 mL Other Once Chris Hutchison DO           Review of patient's allergies indicates:   Allergen Reactions    Lisinopril      Cough       Statins-hmg-coa reductase inhibitors Hives and Itching      Pre-op Assessment    I have reviewed the Patient Summary Reports.     I have reviewed the Nursing Notes. I have reviewed the NPO Status.   I have reviewed the Medications.     Review of Systems  Anesthesia Hx:  No problems with previous Anesthesia             Denies Family Hx of Anesthesia complications.    Denies Personal Hx of Anesthesia complications.                    Social:  Smoker       Cardiovascular:  Exercise tolerance: poor   Hypertension  Past MI        CHF    hyperlipidemia   ECG has been reviewed.              Hx of Myocardial Infarction, MI was > 1 year ago     Congestive Heart Failure (CHF)                Hypertension         Renal/:  Chronic Renal Disease        Kidney Function/Disease             Hepatic/GI:    Hiatal Hernia, GERD      Gerd    Hernia, Hiatal Hernia      Musculoskeletal:  Arthritis        Arthritis        Denies Lumbar Spine Disorders   Neurological:   CVA Neuromuscular Disease,           Arthritis              CVA - Cerebrovasular Accident               Neuromuscular Disease   Endocrine:   Hypothyroidism       Hypothyroidism          Psych:  Psychiatric History                  Physical Exam  General: Well nourished, Alert, Oriented and Cooperative    Airway:  Mouth Opening: Normal  Neck ROM: Normal ROM    Chest/Lungs:  Normal Respiratory Rate    Heart:  Rate: Normal        Anesthesia Plan  Type of Anesthesia, risks & benefits discussed:    Anesthesia Type: Gen Natural Airway, MAC  Intra-op Monitoring Plan: Standard ASA Monitors  Post Op Pain Control Plan: multimodal analgesia and IV/PO Opioids PRN  Induction:  IV  Informed Consent: Informed consent signed with the Patient and all parties understand the risks and agree with anesthesia plan.  All questions answered. Patient consented to blood products? Yes  ASA Score: 3  Day of Surgery Review of History & Physical: I have interviewed and examined the patient. I have  reviewed the patient's H&P dated: There are no significant changes.     Ready For Surgery From Anesthesia Perspective.     .

## 2024-09-03 ENCOUNTER — HOSPITAL ENCOUNTER (OUTPATIENT)
Dept: RADIOLOGY | Facility: HOSPITAL | Age: 69
Discharge: HOME | End: 2024-09-03
Attending: FAMILY MEDICINE
Payer: MEDICARE

## 2024-09-03 ENCOUNTER — OFFICE VISIT (OUTPATIENT)
Dept: VASCULAR SURGERY | Facility: CLINIC | Age: 69
End: 2024-09-03
Payer: MEDICARE

## 2024-09-03 VITALS
HEIGHT: 67 IN | BODY MASS INDEX: 32.65 KG/M2 | WEIGHT: 208 LBS | DIASTOLIC BLOOD PRESSURE: 96 MMHG | HEART RATE: 71 BPM | SYSTOLIC BLOOD PRESSURE: 148 MMHG | RESPIRATION RATE: 18 BRPM

## 2024-09-03 DIAGNOSIS — M79.604 LEG PAIN, BILATERAL: ICD-10-CM

## 2024-09-03 DIAGNOSIS — I87.2 VENOUS INSUFFICIENCY: ICD-10-CM

## 2024-09-03 DIAGNOSIS — R60.0 EDEMA, LOWER EXTREMITY: ICD-10-CM

## 2024-09-03 DIAGNOSIS — M79.605 LEG PAIN, BILATERAL: ICD-10-CM

## 2024-09-03 DIAGNOSIS — I87.2 VENOUS INSUFFICIENCY (CHRONIC) (PERIPHERAL): Primary | ICD-10-CM

## 2024-09-03 PROCEDURE — 99214 OFFICE O/P EST MOD 30 MIN: CPT | Mod: PBBFAC,25 | Performed by: FAMILY MEDICINE

## 2024-09-03 PROCEDURE — 93971 EXTREMITY STUDY: CPT | Mod: TC,LT

## 2024-09-03 PROCEDURE — 93971 EXTREMITY STUDY: CPT | Mod: TC,RT

## 2024-09-03 PROCEDURE — 99999 PR PBB SHADOW E&M-EST. PATIENT-LVL IV: CPT | Mod: PBBFAC,,, | Performed by: FAMILY MEDICINE

## 2024-09-03 PROCEDURE — 99214 OFFICE O/P EST MOD 30 MIN: CPT | Mod: S$PBB,,, | Performed by: FAMILY MEDICINE

## 2024-09-03 NOTE — H&P (VIEW-ONLY)
VEIN CENTER CLINIC NOTE    Patient ID: Victoria Delarosa is a 68 y.o. female.    I. HISTORY     Chief Complaint:   Chief Complaint   Patient presents with    Follow-up     US 5D S/P RT DISTAL GSV VARITHENA AND 4D S/P LT DISTAL GSV VENASEAL ABL         HPI: Victoria Delarosa is a 68 y.o. female who presents today for a 5 day follow-up after undergoing right distal great saphenous vein non thermal ablation and left great saphenous vein non thermal adhesive ablation.  Patient states she did well over the weekend without complications.  States that she is having less swelling, heaviness and foot pain as to prior to her procedures.  Post ablation ultrasound shows well ablated right distal great saphenous vein and left great saphenous vein without evidence of chemical induced thrombus bilaterally.    The patient was initially seen on 06/04/2024 by self referral for evaluation of  bilateral lower extremity edema and pain.  Symptoms are  persistent/worsening and began  greater than 5 years ago.  Location is bilateral  lower extremities below the knees. Symptoms are  worse at the end of the day.  History of venous interventions includes  vein procedures performed bilaterally by a physician in [facility] several years ago.    Denies family history of venous disease.   Patient also admits to history of left popliteal DVT and bilateral Pes about 4 years ago for which she was placed on warfarin and remains on it today.  Last INR was 1.8, she checks this at home.  The patient states she is worn compression stockings over the past 4 years, ever since her DVT and previous diagnosis of venous insufficiency.  She also walks regularly for exercise and elevate her legs when appropriate.  She feels despite these conservative measures her symptoms have worsened over the past several years and she would like to have underlying condition corrected if possible.    Bilateral complete venous reflux study 07/09/2024, shows no acute DVT  bilaterally.  There post thrombotic changes within the left popliteal vein as well as deep venous reflux.  The study also shows a well ablated proximal right great and right small saphenous veins.  Reflux is noted within the distal right great saphenous vein, left great saphenous vein and left small saphenous vein.    Venous Disease Medical Necessity Documentation Initial Visit Date:  06/04/2024 Return Check Date:    Have you ever had a rupture or bleed from a varicose vein in your leg(s)?              [] Yes  [x] No   [] Yes   [] No   Have you ever been diagnosed with phlebitis, cellulitis, or inflammation in the area of the varicose veins of  your leg(s)? PE, DVT  [x] Yes  [] No    [] Yes   [] No   Do you have darkened or inflamed skin on your legs?   [x] Yes   [] No   [] Yes   [] No   Do you have leg swelling?     [x] Yes   [] No   [] Yes   [] No   Do you have leg pain?   [x] Yes   [] No   [] Yes   [] No   If yes, describe the type of pain?    [x]   Stabbing [x]  Radiating [x]  Aching   [x]  Tightness [x]  Throbbing               [x]  Burning [x]  Cramping              Do you have leg discomfort?   [x] Yes   [] No   [] Yes   [] No   If yes, describe the type of discomfort?    [x]  Heaviness []  Fullness   [x]  Restlessness [x] Tired/Fatigued [] Itching              Have you ever worn compression hose?   [x] Yes   [] No   [] Yes   [] No   If yes, how long?    1 1/2 TO 2 YEARS       Do you elevate your legs while sitting?   [x] Yes   [] No   [] Yes   [] No   Does venous disease (varicose veins, ulcers, skin changes, leg pain/swelling) interfere with your daily life?  If yes, check activities you are limited or unable to do.    []  Shower  [x]   Walk  [x]  Exercise  [x] Play with children/grandchildren  [x]  Shop [] Work [x] Stand for any period of time [x] Sleep                               [x] Sitting for an extended period of time.           [x] Yes   [] No   [] Yes   [] No   Do you exercise/have you tried to  exercise (i.e.  Walk our participate in a regular exercise routine)?  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    BMI 32.0-32.9,adult     Bronchitis     CHF (congestive heart failure)     Cobalamin deficiency     Constipation 05/22/2023    Contusion of left knee 08/12/2022    Contusion of right knee 08/12/2022    Deep vein thrombosis     Depressive disorder     Diarrhea 12/07/2022    Fall 08/12/2022    General anesthetics causing adverse effect in therapeutic use     GERD (gastroesophageal reflux disease)     Head injury 08/12/2022    Heart attack     20 yrs ago    Hypertension     Hypothyroidism     Lower extremity edema     Neuropathy     Osteoarthritis of both knees     Pain in left leg     Pain in right leg     Peripheral edema     Pulmonary embolism 2017    Stroke     Varicose veins of bilateral lower extremities with pain     Vitamin D deficiency         Past Surgical History:   Procedure Laterality Date    ABLATION, CHEMICAL SEALANT, VARICOSE VEIN Left 8/30/2024    Procedure: Left GSV VenaSeal Ablation;  Surgeon: Chris Hutchison DO;  Location: [facility];  Service: Peripheral Vascular;  Laterality: Left;    BILATERAL TUBAL LIGATION      CARDIAC CATHETERIZATION      COLONOSCOPY      EYE SURGERY      knee scope Left     SCLEROTHERAPY WITH ULTRASOUND GUIDANCE Right 08/29/2024    Right Distal GSV Varithena Ablation performed by Dr. Misael Hutchison    THYROIDECTOMY, PARTIAL      TONSILLECTOMY, ADENOIDECTOMY      TOTAL KNEE ARTHROPLASTY Bilateral     TOTAL KNEE ARTHROPLASTY Right     TUBAL LIGATION      VAGINAL DELIVERY      x 3       Social History     Tobacco Use   Smoking Status Never    Passive exposure: Past (brothers and sisters.)   Smokeless Tobacco Never         Current Outpatient Medications:     cetirizine (ZYRTEC) 10  MG tablet, Take 10 mg by mouth once daily., Disp: , Rfl:     cyanocobalamin 1,000 mcg/mL injection, INJECT 1 ML EVERY MONTH, Disp: 1 mL, Rfl: 11    ezetimibe (ZETIA) 10 mg tablet, Take 10 mg by mouth once daily., Disp: , Rfl:     furosemide (LASIX) 20 MG tablet, Take 1 tablet (20 mg total) by mouth once daily., Disp: 90 tablet, Rfl: 1    gabapentin (NEURONTIN) 100 MG capsule, Take 100 mg by mouth every evening., Disp: , Rfl:     levothyroxine (SYNTHROID) 25 MCG tablet, TAKE 1 TABLET (25 MCG TOTAL) BY MOUTH ONCE DAILY., Disp: 30 tablet, Rfl: 0    losartan (COZAAR) 50 MG tablet, Take 1 tablet (50 mg total) by mouth once daily. (Patient taking differently: Take 50 mg by mouth once daily. Taking 1/2 tablet daily), Disp: 90 tablet, Rfl: 1    magnesium oxide (MAG-OX) 400 mg (241.3 mg magnesium) tablet, Take 1 tablet by mouth once daily., Disp: , Rfl:     oxybutynin (DITROPAN) 5 MG Tab, Take 1 tablet (5 mg total) by mouth 2 (two) times daily., Disp: 60 tablet, Rfl: 1    potassium chloride (KLOR-CON) 10 MEQ TbSR, Take 1 tablet (10 mEq total) by mouth once daily., Disp: 90 tablet, Rfl: 1    ranolazine (RANEXA) 500 MG Tb12, Take 500 mg by mouth 2 (two) times daily., Disp: , Rfl:     sertraline (ZOLOFT) 100 MG tablet, Take 1 tablet (100 mg total) by mouth once daily., Disp: 90 tablet, Rfl: 1    spironolactone (ALDACTONE) 25 MG tablet, TAKE 1 TABLET (25 MG TOTAL) BY MOUTH ONCE DAILY., Disp: 30 tablet, Rfl: 0    warfarin (COUMADIN) 5 MG tablet, Take 5 mg by mouth Daily., Disp: , Rfl:     Review of Systems   Constitutional:  Negative for activity change, chills, diaphoresis, fatigue and fever.   Respiratory:  Negative for cough and shortness of breath.    Cardiovascular:  Positive for leg swelling. Negative for chest pain and claudication.        Hyperpigmentation LE   Gastrointestinal:  Negative for nausea and vomiting.   Musculoskeletal:  Positive for leg pain. Negative for joint swelling.   Integumentary:  Negative for rash  and wound.   Neurological:  Negative for weakness and numbness.          II. PHYSICAL EXAM     Physical Exam  Constitutional:       General: She is awake. She is not in acute distress.     Appearance: Normal appearance. She is obese. She is not ill-appearing or toxic-appearing.   HENT:      Head: Normocephalic and atraumatic.   Eyes:      Extraocular Movements: Extraocular movements intact.      Conjunctiva/sclera: Conjunctivae normal.      Pupils: Pupils are equal, round, and reactive to light.   Neck:      Vascular: No carotid bruit or JVD.   Cardiovascular:      Rate and Rhythm: Normal rate and regular rhythm.      Pulses:           Dorsalis pedis pulses are detected w/ Doppler on the right side and detected w/ Doppler on the left side.        Posterior tibial pulses are detected w/ Doppler on the right side and detected w/ Doppler on the left side.      Heart sounds: No murmur heard.  Pulmonary:      Effort: Pulmonary effort is normal. No respiratory distress.      Breath sounds: No stridor. No wheezing, rhonchi or rales.   Musculoskeletal:         General: No swelling, tenderness or deformity.      Right lower le+ Edema present.      Left lower le+ Edema present.      Comments:  No evidence of open ulceration, weeping or cellulitis bilaterally.   Feet:      Comments:   Triphasic hand-held dopplerable pulses of the bilateral dorsalis pedis and posterior tibial arteries.  Skin:     General: Skin is warm.      Capillary Refill: Capillary refill takes less than 2 seconds.      Coloration: Skin is not ashen.      Findings: No bruising, erythema, lesion, rash or wound.   Neurological:      Mental Status: She is alert and oriented to person, place, and time.      Motor: No weakness.   Psychiatric:         Speech: Speech normal.         Behavior: Behavior normal. Behavior is cooperative.       Reticular/Spider veins noted:  RLE: anterior calf, medial calf, posterior calf, and lateral calf  LLE: anterior calf,  medial calf, posterior calf, and lateral calf    Varicose veins noted:  RLE: none  LLE:  none    CEAP Classification  Clinical Signs: Class 4 - Skin changes ascribed to venous disease  Etiologic Classification: Primary  Anatomic distribution: Superficial  Pathophysiologic dysfunction: Reflux                       Venous Clinical Severity Score  Pain:2=Daily, moderate activity limitation, occasional analgesics  Varicose Veins: 1=Few, scattered. Branch varicose veins  Venous Edema: 2=Afternoon edema, above ankle  Pigmentation: 0=None or focal, low intensity (tan)  Inflammation: 0=None  Induration: 0=None  Number of Active Ulcers: 0=0  Active Ulceration, Duration: 0=None  Active Ulcer Size: 0=None  Compressive Therapy: 3=Full compliance, stockings + elevation  Total Score: 8       III. ASSESSMENT & PLAN (MEDICAL DECISION MAKING)     1. Venous insufficiency (chronic) (peripheral)    2. Leg pain, bilateral    3. Edema, lower extremity        Assessment/Diagnosis and Plan:  The patient continues to have sequela of chronic venous insufficiency despite over 3 months of conservative therapy. The patient continues to have life altering symptoms as well as a positive reflux study as noted in the history of present illness above. At this time I believe it would be reasonable to proceed with a left small saphenous vein non thermal adhesive endovenous ablation for symptomatic venous insufficiency.   Untreated venous disease increases the patient's risk for cellulitis, deep vein thrombosis, chronic skin changes and venous ulceration.  Risk and benefits of the procedure were explained to the patient and the patient wishes to proceed. The patient does have a history of DVT and will remain on her long-term chronic anticoagulation with warfarin.  We will check an INR the day of the procedure.    The safety of our patients is our first concern, therefore vein procedures performed on the same lower extremity within the same 90 day  episode of care will need to be performed on separate days due to our use of monitored anesthesia care where in the patient is sedated and unable to be moved from a supine to a prone position.         Chris Hutchison, DO

## 2024-09-03 NOTE — PROGRESS NOTES
VEIN CENTER CLINIC NOTE    Patient ID: Victoria Delarosa is a 68 y.o. female.    I. HISTORY     Chief Complaint:   Chief Complaint   Patient presents with    Follow-up     US 5D S/P RT DISTAL GSV VARITHENA AND 4D S/P LT DISTAL GSV VENASEAL ABL         HPI: Victoria Delarosa is a 68 y.o. female who presents today for a 5 day follow-up after undergoing right distal great saphenous vein non thermal ablation and left great saphenous vein non thermal adhesive ablation.  Patient states she did well over the weekend without complications.  States that she is having less swelling, heaviness and foot pain as to prior to her procedures.  Post ablation ultrasound shows well ablated right distal great saphenous vein and left great saphenous vein without evidence of chemical induced thrombus bilaterally.    The patient was initially seen on 06/04/2024 by self referral for evaluation of  bilateral lower extremity edema and pain.  Symptoms are  persistent/worsening and began  greater than 5 years ago.  Location is bilateral  lower extremities below the knees. Symptoms are  worse at the end of the day.  History of venous interventions includes  vein procedures performed bilaterally by a physician in [facility] several years ago.    Denies family history of venous disease.   Patient also admits to history of left popliteal DVT and bilateral Pes about 4 years ago for which she was placed on warfarin and remains on it today.  Last INR was 1.8, she checks this at home.  The patient states she is worn compression stockings over the past 4 years, ever since her DVT and previous diagnosis of venous insufficiency.  She also walks regularly for exercise and elevate her legs when appropriate.  She feels despite these conservative measures her symptoms have worsened over the past several years and she would like to have underlying condition corrected if possible.    Bilateral complete venous reflux study 07/09/2024, shows no acute DVT  bilaterally.  There post thrombotic changes within the left popliteal vein as well as deep venous reflux.  The study also shows a well ablated proximal right great and right small saphenous veins.  Reflux is noted within the distal right great saphenous vein, left great saphenous vein and left small saphenous vein.    Venous Disease Medical Necessity Documentation Initial Visit Date:  06/04/2024 Return Check Date:    Have you ever had a rupture or bleed from a varicose vein in your leg(s)?              [] Yes  [x] No   [] Yes   [] No   Have you ever been diagnosed with phlebitis, cellulitis, or inflammation in the area of the varicose veins of  your leg(s)? PE, DVT  [x] Yes  [] No    [] Yes   [] No   Do you have darkened or inflamed skin on your legs?   [x] Yes   [] No   [] Yes   [] No   Do you have leg swelling?     [x] Yes   [] No   [] Yes   [] No   Do you have leg pain?   [x] Yes   [] No   [] Yes   [] No   If yes, describe the type of pain?    [x]   Stabbing [x]  Radiating [x]  Aching   [x]  Tightness [x]  Throbbing               [x]  Burning [x]  Cramping              Do you have leg discomfort?   [x] Yes   [] No   [] Yes   [] No   If yes, describe the type of discomfort?    [x]  Heaviness []  Fullness   [x]  Restlessness [x] Tired/Fatigued [] Itching              Have you ever worn compression hose?   [x] Yes   [] No   [] Yes   [] No   If yes, how long?    1 1/2 TO 2 YEARS       Do you elevate your legs while sitting?   [x] Yes   [] No   [] Yes   [] No   Does venous disease (varicose veins, ulcers, skin changes, leg pain/swelling) interfere with your daily life?  If yes, check activities you are limited or unable to do.    []  Shower  [x]   Walk  [x]  Exercise  [x] Play with children/grandchildren  [x]  Shop [] Work [x] Stand for any period of time [x] Sleep                               [x] Sitting for an extended period of time.           [x] Yes   [] No   [] Yes   [] No   Do you exercise/have you tried to  exercise (i.e.  Walk our participate in a regular exercise routine)?  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    BMI 32.0-32.9,adult     Bronchitis     CHF (congestive heart failure)     Cobalamin deficiency     Constipation 05/22/2023    Contusion of left knee 08/12/2022    Contusion of right knee 08/12/2022    Deep vein thrombosis     Depressive disorder     Diarrhea 12/07/2022    Fall 08/12/2022    General anesthetics causing adverse effect in therapeutic use     GERD (gastroesophageal reflux disease)     Head injury 08/12/2022    Heart attack     20 yrs ago    Hypertension     Hypothyroidism     Lower extremity edema     Neuropathy     Osteoarthritis of both knees     Pain in left leg     Pain in right leg     Peripheral edema     Pulmonary embolism 2017    Stroke     Varicose veins of bilateral lower extremities with pain     Vitamin D deficiency         Past Surgical History:   Procedure Laterality Date    ABLATION, CHEMICAL SEALANT, VARICOSE VEIN Left 8/30/2024    Procedure: Left GSV VenaSeal Ablation;  Surgeon: Chris Hutchison DO;  Location: [facility];  Service: Peripheral Vascular;  Laterality: Left;    BILATERAL TUBAL LIGATION      CARDIAC CATHETERIZATION      COLONOSCOPY      EYE SURGERY      knee scope Left     SCLEROTHERAPY WITH ULTRASOUND GUIDANCE Right 08/29/2024    Right Distal GSV Varithena Ablation performed by Dr. Misael Hutchison    THYROIDECTOMY, PARTIAL      TONSILLECTOMY, ADENOIDECTOMY      TOTAL KNEE ARTHROPLASTY Bilateral     TOTAL KNEE ARTHROPLASTY Right     TUBAL LIGATION      VAGINAL DELIVERY      x 3       Social History     Tobacco Use   Smoking Status Never    Passive exposure: Past (brothers and sisters.)   Smokeless Tobacco Never         Current Outpatient Medications:     cetirizine (ZYRTEC) 10  MG tablet, Take 10 mg by mouth once daily., Disp: , Rfl:     cyanocobalamin 1,000 mcg/mL injection, INJECT 1 ML EVERY MONTH, Disp: 1 mL, Rfl: 11    ezetimibe (ZETIA) 10 mg tablet, Take 10 mg by mouth once daily., Disp: , Rfl:     furosemide (LASIX) 20 MG tablet, Take 1 tablet (20 mg total) by mouth once daily., Disp: 90 tablet, Rfl: 1    gabapentin (NEURONTIN) 100 MG capsule, Take 100 mg by mouth every evening., Disp: , Rfl:     levothyroxine (SYNTHROID) 25 MCG tablet, TAKE 1 TABLET (25 MCG TOTAL) BY MOUTH ONCE DAILY., Disp: 30 tablet, Rfl: 0    losartan (COZAAR) 50 MG tablet, Take 1 tablet (50 mg total) by mouth once daily. (Patient taking differently: Take 50 mg by mouth once daily. Taking 1/2 tablet daily), Disp: 90 tablet, Rfl: 1    magnesium oxide (MAG-OX) 400 mg (241.3 mg magnesium) tablet, Take 1 tablet by mouth once daily., Disp: , Rfl:     oxybutynin (DITROPAN) 5 MG Tab, Take 1 tablet (5 mg total) by mouth 2 (two) times daily., Disp: 60 tablet, Rfl: 1    potassium chloride (KLOR-CON) 10 MEQ TbSR, Take 1 tablet (10 mEq total) by mouth once daily., Disp: 90 tablet, Rfl: 1    ranolazine (RANEXA) 500 MG Tb12, Take 500 mg by mouth 2 (two) times daily., Disp: , Rfl:     sertraline (ZOLOFT) 100 MG tablet, Take 1 tablet (100 mg total) by mouth once daily., Disp: 90 tablet, Rfl: 1    spironolactone (ALDACTONE) 25 MG tablet, TAKE 1 TABLET (25 MG TOTAL) BY MOUTH ONCE DAILY., Disp: 30 tablet, Rfl: 0    warfarin (COUMADIN) 5 MG tablet, Take 5 mg by mouth Daily., Disp: , Rfl:     Review of Systems   Constitutional:  Negative for activity change, chills, diaphoresis, fatigue and fever.   Respiratory:  Negative for cough and shortness of breath.    Cardiovascular:  Positive for leg swelling. Negative for chest pain and claudication.        Hyperpigmentation LE   Gastrointestinal:  Negative for nausea and vomiting.   Musculoskeletal:  Positive for leg pain. Negative for joint swelling.   Integumentary:  Negative for rash  and wound.   Neurological:  Negative for weakness and numbness.          II. PHYSICAL EXAM     Physical Exam  Constitutional:       General: She is awake. She is not in acute distress.     Appearance: Normal appearance. She is obese. She is not ill-appearing or toxic-appearing.   HENT:      Head: Normocephalic and atraumatic.   Eyes:      Extraocular Movements: Extraocular movements intact.      Conjunctiva/sclera: Conjunctivae normal.      Pupils: Pupils are equal, round, and reactive to light.   Neck:      Vascular: No carotid bruit or JVD.   Cardiovascular:      Rate and Rhythm: Normal rate and regular rhythm.      Pulses:           Dorsalis pedis pulses are detected w/ Doppler on the right side and detected w/ Doppler on the left side.        Posterior tibial pulses are detected w/ Doppler on the right side and detected w/ Doppler on the left side.      Heart sounds: No murmur heard.  Pulmonary:      Effort: Pulmonary effort is normal. No respiratory distress.      Breath sounds: No stridor. No wheezing, rhonchi or rales.   Musculoskeletal:         General: No swelling, tenderness or deformity.      Right lower le+ Edema present.      Left lower le+ Edema present.      Comments:  No evidence of open ulceration, weeping or cellulitis bilaterally.   Feet:      Comments:   Triphasic hand-held dopplerable pulses of the bilateral dorsalis pedis and posterior tibial arteries.  Skin:     General: Skin is warm.      Capillary Refill: Capillary refill takes less than 2 seconds.      Coloration: Skin is not ashen.      Findings: No bruising, erythema, lesion, rash or wound.   Neurological:      Mental Status: She is alert and oriented to person, place, and time.      Motor: No weakness.   Psychiatric:         Speech: Speech normal.         Behavior: Behavior normal. Behavior is cooperative.       Reticular/Spider veins noted:  RLE: anterior calf, medial calf, posterior calf, and lateral calf  LLE: anterior calf,  medial calf, posterior calf, and lateral calf    Varicose veins noted:  RLE: none  LLE:  none    CEAP Classification  Clinical Signs: Class 4 - Skin changes ascribed to venous disease  Etiologic Classification: Primary  Anatomic distribution: Superficial  Pathophysiologic dysfunction: Reflux                       Venous Clinical Severity Score  Pain:2=Daily, moderate activity limitation, occasional analgesics  Varicose Veins: 1=Few, scattered. Branch varicose veins  Venous Edema: 2=Afternoon edema, above ankle  Pigmentation: 0=None or focal, low intensity (tan)  Inflammation: 0=None  Induration: 0=None  Number of Active Ulcers: 0=0  Active Ulceration, Duration: 0=None  Active Ulcer Size: 0=None  Compressive Therapy: 3=Full compliance, stockings + elevation  Total Score: 8       III. ASSESSMENT & PLAN (MEDICAL DECISION MAKING)     1. Venous insufficiency (chronic) (peripheral)    2. Leg pain, bilateral    3. Edema, lower extremity        Assessment/Diagnosis and Plan:  The patient continues to have sequela of chronic venous insufficiency despite over 3 months of conservative therapy. The patient continues to have life altering symptoms as well as a positive reflux study as noted in the history of present illness above. At this time I believe it would be reasonable to proceed with a left small saphenous vein non thermal adhesive endovenous ablation for symptomatic venous insufficiency.   Untreated venous disease increases the patient's risk for cellulitis, deep vein thrombosis, chronic skin changes and venous ulceration.  Risk and benefits of the procedure were explained to the patient and the patient wishes to proceed. The patient does have a history of DVT and will remain on her long-term chronic anticoagulation with warfarin.  We will check an INR the day of the procedure.    The safety of our patients is our first concern, therefore vein procedures performed on the same lower extremity within the same 90 day  episode of care will need to be performed on separate days due to our use of monitored anesthesia care where in the patient is sedated and unable to be moved from a supine to a prone position.         Chris Hutchison, DO

## 2024-09-04 ENCOUNTER — PATIENT OUTREACH (OUTPATIENT)
Facility: HOSPITAL | Age: 69
End: 2024-09-04
Payer: MEDICARE

## 2024-09-04 NOTE — PROGRESS NOTES
EYE EXAM []  Eye Exam Screening Order Placed    []  Eye Camera Scheduled or Optometry/Ophthalmology Referral Placed    []  External Records Requested & Care Team Updated if Applicable    [x]  External Records Uploaded, Care Team Updated, & History Updated if Applicable    []  Patient Declined Scheduling Eye Exam    []  Patient Will Schedule with External Provider & Care Team Updated if Applicable

## 2024-09-05 RX ORDER — LIDOCAINE HYDROCHLORIDE 10 MG/ML
2 INJECTION, SOLUTION INFILTRATION; PERINEURAL ONCE
Status: DISCONTINUED | OUTPATIENT
Start: 2024-09-05 | End: 2024-09-06 | Stop reason: HOSPADM

## 2024-09-06 ENCOUNTER — ANESTHESIA (OUTPATIENT)
Dept: PAIN MEDICINE | Facility: HOSPITAL | Age: 69
End: 2024-09-06
Payer: MEDICARE

## 2024-09-06 ENCOUNTER — ANESTHESIA EVENT (OUTPATIENT)
Dept: PAIN MEDICINE | Facility: HOSPITAL | Age: 69
End: 2024-09-06
Payer: MEDICARE

## 2024-09-06 ENCOUNTER — HOSPITAL ENCOUNTER (OUTPATIENT)
Facility: HOSPITAL | Age: 69
Discharge: HOME | End: 2024-09-06
Attending: FAMILY MEDICINE | Admitting: FAMILY MEDICINE
Payer: MEDICARE

## 2024-09-06 VITALS — HEART RATE: 78 BPM | SYSTOLIC BLOOD PRESSURE: 106 MMHG | DIASTOLIC BLOOD PRESSURE: 55 MMHG | OXYGEN SATURATION: 93 %

## 2024-09-06 VITALS
DIASTOLIC BLOOD PRESSURE: 60 MMHG | WEIGHT: 208 LBS | HEART RATE: 67 BPM | RESPIRATION RATE: 13 BRPM | OXYGEN SATURATION: 100 % | TEMPERATURE: 99 F | SYSTOLIC BLOOD PRESSURE: 129 MMHG | BODY MASS INDEX: 32.65 KG/M2 | HEIGHT: 67 IN

## 2024-09-06 DIAGNOSIS — I87.2 VENOUS INSUFFICIENCY (CHRONIC) (PERIPHERAL): Primary | ICD-10-CM

## 2024-09-06 DIAGNOSIS — I87.2 VENOUS INSUFFICIENCY: Primary | ICD-10-CM

## 2024-09-06 PROCEDURE — 27201423 OPTIME MED/SURG SUP & DEVICES STERILE SUPPLY: Performed by: FAMILY MEDICINE

## 2024-09-06 PROCEDURE — 36482 ENDOVEN THER CHEM ADHES 1ST: CPT | Mod: LT,,, | Performed by: FAMILY MEDICINE

## 2024-09-06 PROCEDURE — 25000003 PHARM REV CODE 250: Performed by: FAMILY MEDICINE

## 2024-09-06 PROCEDURE — 37000009 HC ANESTHESIA EA ADD 15 MINS: Performed by: FAMILY MEDICINE

## 2024-09-06 PROCEDURE — 37000008 HC ANESTHESIA 1ST 15 MINUTES: Performed by: FAMILY MEDICINE

## 2024-09-06 PROCEDURE — 36482 ENDOVEN THER CHEM ADHES 1ST: CPT | Mod: LT | Performed by: FAMILY MEDICINE

## 2024-09-06 PROCEDURE — 25000003 PHARM REV CODE 250: Performed by: NURSE ANESTHETIST, CERTIFIED REGISTERED

## 2024-09-06 PROCEDURE — 63600175 PHARM REV CODE 636 W HCPCS: Performed by: NURSE ANESTHETIST, CERTIFIED REGISTERED

## 2024-09-06 PROCEDURE — 27000284 HC CANNULA NASAL: Performed by: NURSE ANESTHETIST, CERTIFIED REGISTERED

## 2024-09-06 PROCEDURE — 27000716 HC OXISENSOR PROBE, ANY SIZE: Performed by: NURSE ANESTHETIST, CERTIFIED REGISTERED

## 2024-09-06 RX ORDER — LIDOCAINE HYDROCHLORIDE 10 MG/ML
INJECTION, SOLUTION INFILTRATION; PERINEURAL CODE/TRAUMA/SEDATION MEDICATION
Status: DISCONTINUED | OUTPATIENT
Start: 2024-09-06 | End: 2024-09-06 | Stop reason: HOSPADM

## 2024-09-06 RX ORDER — PROPOFOL 10 MG/ML
VIAL (ML) INTRAVENOUS CONTINUOUS PRN
Status: DISCONTINUED | OUTPATIENT
Start: 2024-09-06 | End: 2024-09-06

## 2024-09-06 RX ORDER — SODIUM CHLORIDE 9 MG/ML
INJECTION, SOLUTION INTRAVENOUS CONTINUOUS
Status: DISCONTINUED | OUTPATIENT
Start: 2024-09-06 | End: 2024-09-06 | Stop reason: HOSPADM

## 2024-09-06 RX ORDER — LIDOCAINE HYDROCHLORIDE 20 MG/ML
INJECTION, SOLUTION EPIDURAL; INFILTRATION; INTRACAUDAL; PERINEURAL
Status: DISCONTINUED | OUTPATIENT
Start: 2024-09-06 | End: 2024-09-06

## 2024-09-06 RX ORDER — MUPIROCIN 20 MG/G
OINTMENT TOPICAL CODE/TRAUMA/SEDATION MEDICATION
Status: DISCONTINUED | OUTPATIENT
Start: 2024-09-06 | End: 2024-09-06 | Stop reason: HOSPADM

## 2024-09-06 RX ADMIN — PROPOFOL 150 MCG/KG/MIN: 10 INJECTION, EMULSION INTRAVENOUS at 02:09

## 2024-09-06 RX ADMIN — LIDOCAINE HYDROCHLORIDE 75 MG: 20 INJECTION, SOLUTION EPIDURAL; INFILTRATION; INTRACAUDAL; PERINEURAL at 02:09

## 2024-09-06 RX ADMIN — SODIUM CHLORIDE: 9 INJECTION, SOLUTION INTRAVENOUS at 02:09

## 2024-09-06 NOTE — ANESTHESIA POSTPROCEDURE EVALUATION
Anesthesia Post Evaluation    Patient: Victoria Delarosa    Procedure(s) Performed: Procedure(s) (LRB):  Left SSV VenaSeal Ablation (Left)    Final Anesthesia Type: MAC      Patient participation: Yes- Able to Participate  Level of consciousness: awake and alert  Post-procedure vital signs: reviewed and stable  Pain management: adequate  Airway patency: patent    PONV status at discharge: No PONV  Anesthetic complications: no      Cardiovascular status: blood pressure returned to baseline, hemodynamically stable and stable  Respiratory status: unassisted  Hydration status: euvolemic  Follow-up not needed.  Comments: Refer to nursing notes for pain/zachery score upon discharge from recovery              Vitals Value Taken Time   /60 09/06/24 1524   Temp 97 F 09/06/24 1640   Pulse 67 09/06/24 1524   Resp 12 09/06/24 1522   SpO2 100 % 09/06/24 1522   Vitals shown include unfiled device data.      Event Time   Out of Recovery 15:24:00         Pain/Zachery Score: Zachery Score: 10 (9/6/2024  3:09 PM)           Chest pain

## 2024-09-06 NOTE — TRANSFER OF CARE
"Anesthesia Transfer of Care Note    Patient: Victoria Delarosa    Procedure(s) Performed: Procedure(s) (LRB):  Left SSV VenaSeal Ablation (Left)    Patient location: Other: (Pain Tx Center) [facility] Tx [facility]    Anesthesia Type: MAC    Transport from OR: Transported from OR on room air with adequate spontaneous ventilation    Post pain: adequate analgesia    Post assessment: no apparent anesthetic complications    Post vital signs: stable    Level of consciousness: sedated and responds to stimulation    Nausea/Vomiting: no nausea/vomiting    Complications: none    Transfer of care protocol was followed      Last vitals: Visit Vitals  /60   Pulse 67   Temp 37.1 °C (98.7 °F) (Oral)   Resp 13   Ht 5' 7" (1.702 m)   Wt 94.3 kg (208 lb)   SpO2 100%   BMI 32.58 kg/m²     "

## 2024-09-06 NOTE — ANESTHESIA PREPROCEDURE EVALUATION
09/06/2024  Victoria Delarosa is a 68 y.o., female.    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    BMI 32.0-32.9,adult     Bronchitis     CHF (congestive heart failure)     Cobalamin deficiency     Constipation 05/22/2023    Contusion of left knee 08/12/2022    Contusion of right knee 08/12/2022    Deep vein thrombosis     Depressive disorder     Diarrhea 12/07/2022    Fall 08/12/2022    General anesthetics causing adverse effect in therapeutic use     GERD (gastroesophageal reflux disease)     Head injury 08/12/2022    Heart attack     20 yrs ago    Hypertension     Hypothyroidism     Lower extremity edema     Neuropathy     Osteoarthritis of both knees     Pain in left leg     Pain in right leg     Peripheral edema     Pulmonary embolism 2017    Stroke     Varicose veins of bilateral lower extremities with pain     Vitamin D deficiency        Past Surgical History:   Procedure Laterality Date    ABLATION, CHEMICAL SEALANT, VARICOSE VEIN Left 8/30/2024    Procedure: Left GSV VenaSeal Ablation;  Surgeon: Chris Hutchison DO;  Location: [facility];  Service: Peripheral Vascular;  Laterality: Left;    BILATERAL TUBAL LIGATION      CARDIAC CATHETERIZATION      COLONOSCOPY      EYE SURGERY      knee scope Left     SCLEROTHERAPY WITH ULTRASOUND GUIDANCE Right 08/29/2024    Right Distal GSV Varithena Ablation performed by Dr. Misael Hutchison    THYROIDECTOMY, PARTIAL      TONSILLECTOMY, ADENOIDECTOMY      TOTAL KNEE ARTHROPLASTY Bilateral     TOTAL KNEE ARTHROPLASTY Right     TUBAL LIGATION      VAGINAL DELIVERY      x 3       Family History   Problem Relation Name Age of Onset    Hypertension Mother      Heart disease Mother      Liver disease Father      Arthritis Sister      Heart  disease Sister      Arthritis Brother      Heart disease Brother      Stroke Brother      No Known Problems Brother      No Known Problems Daughter      Drug abuse Son      No Known Problems Son         Social History     Socioeconomic History    Marital status:     Number of children: 3   Occupational History    Occupation: retired   Tobacco Use    Smoking status: Never     Passive exposure: Past (brothers and sisters.)    Smokeless tobacco: Never   Substance and Sexual Activity    Alcohol use: Never    Drug use: Never    Sexual activity: Yes     Partners: Male     Birth control/protection: Post-menopausal, Surgical     Comment: tubal     Social Determinants of Health     Financial Resource Strain: Medium Risk (8/14/2024)    Overall Financial Resource Strain (CARDIA)     Difficulty of Paying Living Expenses: Somewhat hard   Food Insecurity: No Food Insecurity (8/14/2024)    Hunger Vital Sign     Worried About Running Out of Food in the Last Year: Never true     Ran Out of Food in the Last Year: Never true   Transportation Needs: No Transportation Needs (8/14/2024)    PRAPARE - Transportation     Lack of Transportation (Medical): No     Lack of Transportation (Non-Medical): No   Physical Activity: Insufficiently Active (8/14/2024)    Exercise Vital Sign     Days of Exercise per Week: 2 days     Minutes of Exercise per Session: 20 min   Stress: No Stress Concern Present (8/14/2024)    [employer] [place] [employer] - Occupational Stress Questionnaire     Feeling of Stress : Only a little   Housing Stability: Low Risk  (8/14/2024)    Housing Stability Vital Sign     Unable to Pay for Housing in the Last Year: No     Homeless in the Last Year: No       Current Facility-Administered Medications   Medication Dose Route Frequency Provider Last Rate Last Admin    0.9%  NaCl infusion   Intravenous Continuous Chris Hutchison DO        LIDOcaine HCL 10 mg/ml (1%) injection 2 mL  2 mL Other Once Kianna  Chris HODGE DO           Review of patient's allergies indicates:   Allergen Reactions    Lisinopril      Cough      Statins-hmg-coa reductase inhibitors Hives and Itching      Pre-op Assessment    I have reviewed the Patient Summary Reports.     I have reviewed the Nursing Notes. I have reviewed the NPO Status.   I have reviewed the Medications.     Review of Systems  Anesthesia Hx:  No problems with previous Anesthesia             Denies Family Hx of Anesthesia complications.    Denies Personal Hx of Anesthesia complications.                    Hematology/Oncology:    Oncology Normal                                   EENT/Dental:  EENT/Dental Normal           Cardiovascular:     Hypertension  Past MI        CHF    hyperlipidemia                             Renal/:  Chronic Renal Disease                Hepatic/GI:    Hiatal Hernia, GERD             Musculoskeletal:  Arthritis               Neurological:   CVA Neuromuscular Disease,                                   Endocrine:   Hypothyroidism        Obesity / BMI > 30  Dermatological:  Skin Normal    Psych:  Psychiatric History                  Physical Exam  General: Well nourished, Alert, Oriented and Cooperative    Airway:  Mouth Opening: Normal  Neck ROM: Normal ROM    Chest/Lungs:  Normal Respiratory Rate    Heart:  Rate: Normal        Anesthesia Plan  Type of Anesthesia, risks & benefits discussed:    Anesthesia Type: Gen Natural Airway, MAC  Intra-op Monitoring Plan: Standard ASA Monitors  Post Op Pain Control Plan: multimodal analgesia and IV/PO Opioids PRN  Induction:  IV  Informed Consent: Informed consent signed with the Patient and all parties understand the risks and agree with anesthesia plan.  All questions answered. Patient consented to blood products? Yes  ASA Score: 3  Day of Surgery Review of History & Physical: I have interviewed and examined the patient. I have reviewed the patient's H&P dated: There are no significant changes.     Ready For  Surgery From Anesthesia Perspective.     .

## 2024-09-06 NOTE — OP NOTE
Date: 9/6/24   Surgeon: Dr Misael Hutchison DO    Procedure: Endovenous Adhesive Ablation of the left small Saphenous Vein     Estimated blood loss: 2 cc.  Specimens removed:  None.  Complications:  None.  Implants or grafts:  None.    Findings: Treatment Length  25 (cm):  Maximum diameter of the vein treated 4.7 mm with a maximum reflux time of 0.74 seconds.      Pre-Procedure: Patient's vital signs and informed consent were obtained. Patient history was checked and updated with any changes since the last visit. The patient continues to present with symptoms of venous disease as proven via DUS Venous Insufficiency exam completed prior to procedure. A complete Time Out was performed; with all parties present, which verified patient's identification, intended procedure, correct procedure site, and all equipment/supplies needed to complete the procedure.     Procedure: Ultrasound guidance was used to nnamdi the target vein with the patient positioned on the treatment table. The entire lower extremity was prepped with a 50/50 % solution of isopropyl alcohol and chlorhexidine; then sterile drapes were applied. Once the desired access point was identified; less than 5mL of 0.5% Lidocaine buffered with Sodium Bicarbonate was distributed, to comfortably gain access in real-time with ultrasound guidance. A guidewire was used as necessary, which allowed insertion of the blue introduction catheter. Once positioned, the delivery catheter was prepped and inserted into the introducer sheath. The delivery catheter position was confirmed via ultrasound 5cm distal to Saphenofemoral junction. Following the IFU, adhesive was delivered, segmentally, into the target vein. Ultrasound visualization confirmed successful treatment of the targeted vein with no evidence of complications at the junction. All devices were then removed, and hemostasis was achieved with a suture. Dressings with compression were applied to the access site and to any  puncture sites requiring them. There was minimal blood loss.

## 2024-09-06 NOTE — PLAN OF CARE
1451- Rec'd pt asleep but easily aroused. VSS. Dressing to L leg CDI, pulses palpable. All lines and tubes intact. Positioned for comfort and safety. Care ongoing.     1509- Pt awake and alert. Able to tolerate food and drink. VSS. Dressing to L leg CDI, pulses palpable. All lines and tubes intact. Positioned for comfort and safety. Care ongoing.     1524-Pt up and ambulating. All lines and tubes d/c'd. Dressing to L leg CDI, pulses palpable. VSS. Pending d/c.    1542- Pt d/c'd via wheelchair. Education provided.

## 2024-09-06 NOTE — DISCHARGE SUMMARY
[facility] - Pain Management  Discharge Note  Short Stay    Procedure(s) (LRB):  Left SSV VenaSeal Ablation (Left)      OUTCOME: Patient tolerated treatment/procedure well without complication and is now ready for discharge.    DISPOSITION: Home or Self Care    FINAL DIAGNOSIS:  Venous insufficiency    FOLLOWUP: In clinic    DISCHARGE INSTRUCTIONS:  No discharge procedures on file.     TIME SPENT ON DISCHARGE: 5 minutes

## 2024-09-06 NOTE — INTERVAL H&P NOTE
The patient has been examined and the H&P has been reviewed:    I concur with the findings and no changes have occurred since H&P was written.    Surgery risks, benefits and alternative options discussed and understood by patient/family for a left small saphenous vein non thermal adhesive ablation.          There are no hospital problems to display for this patient.

## 2024-09-06 NOTE — DISCHARGE INSTRUCTIONS
Vein Procedure Discharge Instructions    Today:  Someone must stay with you tonight.  No alcohol for at least 8 hours after your procedure.  No driving for 24 hours after your procedure if you receive sedation/anesthesia.    For the first 48 hours (2 days) following your procedure:  Walk 15 minutes on every hour (while awake).  Keep leg propped up (recliner, couch, etc.) while sitting.  Take pain medication, if prescribed.  Take Ibuprofen, or Acetaminophen, for the next 2-3 days with an over-the-counter anti-acid (Prilosec, Protonix, Nexium, etc.)    After your 48 hours (2 days) Post Op period complete:  Remove dressings, throw away all except the ACE wrap, keep the ACE wrap.  Shower using warm soapy water.  Use separate wash cloth on the treated leg.  No baths for 2 weeks.  Compression up to groin must be worn daily for 2 weeks.  You may use the ACE wrap for entire leg, or compression hose to knees and ACE wrap to groin.  (If you had your small saphenous vein ablated, then compression only to knee)  If your vein was ablated with Varithena (foam), compression must be worn day and night.  Walk for at least 10 minutes daily for the next month.    Activity:  If you have a normal ultrasound one week after your ablation:  You may resume walking activities immediately.  You may resume jogging 2 weeks after your procedure.  You may resume swimming 2 weeks after your procedure.  If you had microphlebectomies, you must make sure all areas are completely healed before swimming.  You may resume lower body weight lifting 2 weeks after your procedure.  You may resume upper body weight lifting while sitting down 2 days after your procedure.  You may resume sexual activities 2 weeks after your procedure.  You may fly commercially 2 weeks after your procedure.  You may scuba dive 1 month after your procedure.    Pilots - You may not fly for 1 month after your procedure.  You must have a normal 1 month post op ultrasound before  returning to flight status.    Your follow-up appointment is: _________________________________________________________    Please call our office at [phone] with any questions or concerns.  You may call the following number for after hour and weekend concerns: [phone] (Dr. Hutchison).    Patient Signature ______________________________________Date/time_______________________

## 2024-09-09 ENCOUNTER — OFFICE VISIT (OUTPATIENT)
Dept: VASCULAR SURGERY | Facility: CLINIC | Age: 69
End: 2024-09-09
Payer: MEDICARE

## 2024-09-09 ENCOUNTER — HOSPITAL ENCOUNTER (OUTPATIENT)
Dept: RADIOLOGY | Facility: HOSPITAL | Age: 69
Discharge: HOME | End: 2024-09-09
Attending: FAMILY MEDICINE
Payer: MEDICARE

## 2024-09-09 VITALS
WEIGHT: 207.88 LBS | DIASTOLIC BLOOD PRESSURE: 72 MMHG | RESPIRATION RATE: 18 BRPM | HEIGHT: 67 IN | HEART RATE: 74 BPM | BODY MASS INDEX: 32.63 KG/M2 | SYSTOLIC BLOOD PRESSURE: 116 MMHG

## 2024-09-09 DIAGNOSIS — M79.604 LEG PAIN, BILATERAL: ICD-10-CM

## 2024-09-09 DIAGNOSIS — M79.605 LEG PAIN, BILATERAL: ICD-10-CM

## 2024-09-09 DIAGNOSIS — I87.2 VENOUS INSUFFICIENCY (CHRONIC) (PERIPHERAL): Primary | ICD-10-CM

## 2024-09-09 DIAGNOSIS — I87.2 VENOUS INSUFFICIENCY (CHRONIC) (PERIPHERAL): ICD-10-CM

## 2024-09-09 DIAGNOSIS — R60.0 EDEMA, LOWER EXTREMITY: ICD-10-CM

## 2024-09-09 PROCEDURE — 99214 OFFICE O/P EST MOD 30 MIN: CPT | Mod: S$PBB,,, | Performed by: FAMILY MEDICINE

## 2024-09-09 PROCEDURE — 99999 PR PBB SHADOW E&M-EST. PATIENT-LVL IV: CPT | Mod: PBBFAC,,, | Performed by: FAMILY MEDICINE

## 2024-09-09 PROCEDURE — 93971 EXTREMITY STUDY: CPT | Mod: 26,LT,, | Performed by: FAMILY MEDICINE

## 2024-09-09 PROCEDURE — 99214 OFFICE O/P EST MOD 30 MIN: CPT | Mod: PBBFAC,25 | Performed by: FAMILY MEDICINE

## 2024-09-09 PROCEDURE — 93971 EXTREMITY STUDY: CPT | Mod: TC,LT

## 2024-09-09 NOTE — PROGRESS NOTES
VEIN CENTER CLINIC NOTE    Patient ID: Victoria Delarosa is a 68 y.o. female.    I. HISTORY     Chief Complaint:   Chief Complaint   Patient presents with    Follow-up     3D S/P LT SSV VENASEAL ABL         HPI: Victoria Delarosa is a 68 y.o. female who presents today for a 5 day follow-up after undergoing left small saphenous vein non thermal adhesive ablation.  Patient states she did well over the weekend without complications.  Post ablation ultrasound shows a well ablated left small saphenous vein without evidence of heat induced thrombus.  Over the past couple weeks she has also had left great saphenous non thermal adhesive ablation as well as right distal great saphenous vein non thermal foam ablation.  She states that her legs feel better bilaterally.  Swelling, heaviness and pain.  She continues with postoperative compression.      Clinical summary:  The patient was initially seen on 06/04/2024 by self referral for evaluation of  bilateral lower extremity edema and pain.  Symptoms are  persistent/worsening and began  greater than 5 years ago.  Location is bilateral  lower extremities below the knees. Symptoms are  worse at the end of the day.  History of venous interventions includes  vein procedures performed bilaterally by a physician in [facility] several years ago.    Denies family history of venous disease.   Patient also admits to history of left popliteal DVT and bilateral Pes about 4 years ago for which she was placed on warfarin and remains on it today.  Last INR was 1.8, she checks this at home.  The patient states she is worn compression stockings over the past 4 years, ever since her DVT and previous diagnosis of venous insufficiency.  She also walks regularly for exercise and elevate her legs when appropriate.  She feels despite these conservative measures her symptoms have worsened over the past several years and she would like to have underlying condition corrected if possible.    Bilateral  complete venous reflux study 07/09/2024, shows no acute DVT bilaterally.  There post thrombotic changes within the left popliteal vein as well as deep venous reflux.  The study also shows a well ablated proximal right great and right small saphenous veins.  Reflux is noted within the distal right great saphenous vein, left great saphenous vein and left small saphenous vein.    Venous Disease Medical Necessity Documentation Initial Visit Date:  06/04/2024 Return Check Date:    Have you ever had a rupture or bleed from a varicose vein in your leg(s)?              [] Yes  [x] No   [] Yes   [] No   Have you ever been diagnosed with phlebitis, cellulitis, or inflammation in the area of the varicose veins of  your leg(s)? PE, DVT  [x] Yes  [] No    [] Yes   [] No   Do you have darkened or inflamed skin on your legs?   [x] Yes   [] No   [] Yes   [] No   Do you have leg swelling?     [x] Yes   [] No   [] Yes   [] No   Do you have leg pain?   [x] Yes   [] No   [] Yes   [] No   If yes, describe the type of pain?    [x]   Stabbing [x]  Radiating [x]  Aching   [x]  Tightness [x]  Throbbing               [x]  Burning [x]  Cramping              Do you have leg discomfort?   [x] Yes   [] No   [] Yes   [] No   If yes, describe the type of discomfort?    [x]  Heaviness []  Fullness   [x]  Restlessness [x] Tired/Fatigued [] Itching              Have you ever worn compression hose?   [x] Yes   [] No   [] Yes   [] No   If yes, how long?    1 1/2 TO 2 YEARS       Do you elevate your legs while sitting?   [x] Yes   [] No   [] Yes   [] No   Does venous disease (varicose veins, ulcers, skin changes, leg pain/swelling) interfere with your daily life?  If yes, check activities you are limited or unable to do.    []  Shower  [x]   Walk  [x]  Exercise  [x] Play with children/grandchildren  [x]  Shop [] Work [x] Stand for any period of time [x] Sleep                               [x] Sitting for an extended period of time.           [x] Yes    [] No   [] Yes   [] No   Do you exercise/have you tried to exercise (i.e.  Walk our participate in a regular exercise routine)?  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    BMI 32.0-32.9,adult     Bronchitis     CHF (congestive heart failure)     Cobalamin deficiency     Constipation 05/22/2023    Contusion of left knee 08/12/2022    Contusion of right knee 08/12/2022    Deep vein thrombosis     Depressive disorder     Diarrhea 12/07/2022    Fall 08/12/2022    General anesthetics causing adverse effect in therapeutic use     GERD (gastroesophageal reflux disease)     Head injury 08/12/2022    Heart attack     20 yrs ago    Hypertension     Hypothyroidism     Lower extremity edema     Neuropathy     Osteoarthritis of both knees     Pain in left leg     Pain in right leg     Peripheral edema     Pulmonary embolism 2017    Stroke     Varicose veins of bilateral lower extremities with pain     Vitamin D deficiency         Past Surgical History:   Procedure Laterality Date    ABLATION, CHEMICAL SEALANT, VARICOSE VEIN Left 8/30/2024    Procedure: Left GSV VenaSeal Ablation;  Surgeon: Chris Hutchison DO;  [facility];  Service: Peripheral Vascular;  Laterality: Left;    ABLATION, CHEMICAL SEALANT, VARICOSE VEIN Left 9/6/2024    Procedure: Left SSV VenaSeal Ablation;  Surgeon: Chris Hutchison DO;  Location: [facility];  Service: Peripheral Vascular;  Laterality: Left;    BILATERAL TUBAL LIGATION      CARDIAC CATHETERIZATION      COLONOSCOPY      EYE SURGERY      knee scope Left     SCLEROTHERAPY WITH ULTRASOUND GUIDANCE Right 08/29/2024    Right Distal GSV Varithena Ablation performed by Dr. Misael Hutchison    THYROIDECTOMY, PARTIAL      TONSILLECTOMY, ADENOIDECTOMY      TOTAL KNEE ARTHROPLASTY Bilateral     TOTAL KNEE  ARTHROPLASTY Right     TUBAL LIGATION      VAGINAL DELIVERY      x 3       Social History     Tobacco Use   Smoking Status Never    Passive exposure: Past (brothers and sisters.)   Smokeless Tobacco Never         Current Outpatient Medications:     cetirizine (ZYRTEC) 10 MG tablet, Take 10 mg by mouth once daily., Disp: , Rfl:     cyanocobalamin 1,000 mcg/mL injection, INJECT 1 ML EVERY MONTH, Disp: 1 mL, Rfl: 11    ezetimibe (ZETIA) 10 mg tablet, Take 10 mg by mouth once daily., Disp: , Rfl:     furosemide (LASIX) 20 MG tablet, Take 1 tablet (20 mg total) by mouth once daily., Disp: 90 tablet, Rfl: 1    gabapentin (NEURONTIN) 100 MG capsule, Take 100 mg by mouth every evening., Disp: , Rfl:     levothyroxine (SYNTHROID) 25 MCG tablet, TAKE 1 TABLET (25 MCG TOTAL) BY MOUTH ONCE DAILY., Disp: 30 tablet, Rfl: 0    losartan (COZAAR) 50 MG tablet, Take 1 tablet (50 mg total) by mouth once daily. (Patient taking differently: Take 50 mg by mouth once daily. Taking 1/2 tablet daily), Disp: 90 tablet, Rfl: 1    magnesium oxide (MAG-OX) 400 mg (241.3 mg magnesium) tablet, Take 1 tablet by mouth once daily., Disp: , Rfl:     oxybutynin (DITROPAN) 5 MG Tab, Take 1 tablet (5 mg total) by mouth 2 (two) times daily., Disp: 60 tablet, Rfl: 1    potassium chloride (KLOR-CON) 10 MEQ TbSR, Take 1 tablet (10 mEq total) by mouth once daily., Disp: 90 tablet, Rfl: 1    ranolazine (RANEXA) 500 MG Tb12, Take 500 mg by mouth 2 (two) times daily., Disp: , Rfl:     sertraline (ZOLOFT) 100 MG tablet, Take 1 tablet (100 mg total) by mouth once daily., Disp: 90 tablet, Rfl: 1    spironolactone (ALDACTONE) 25 MG tablet, TAKE 1 TABLET (25 MG TOTAL) BY MOUTH ONCE DAILY., Disp: 30 tablet, Rfl: 0    warfarin (COUMADIN) 5 MG tablet, Take 5 mg by mouth Daily., Disp: , Rfl:     Review of Systems   Constitutional:  Negative for activity change, chills, diaphoresis, fatigue and fever.   Respiratory:  Negative for cough and shortness of breath.     Cardiovascular:  Positive for leg swelling. Negative for chest pain and claudication.        Hyperpigmentation LE   Gastrointestinal:  Negative for nausea and vomiting.   Musculoskeletal:  Positive for leg pain. Negative for joint swelling.   Integumentary:  Negative for rash and wound.   Neurological:  Negative for weakness and numbness.          II. PHYSICAL EXAM     Physical Exam  Constitutional:       General: She is awake. She is not in acute distress.     Appearance: Normal appearance. She is obese. She is not ill-appearing or toxic-appearing.   HENT:      Head: Normocephalic and atraumatic.   Eyes:      Extraocular Movements: Extraocular movements intact.      Conjunctiva/sclera: Conjunctivae normal.      Pupils: Pupils are equal, round, and reactive to light.   Neck:      Vascular: No carotid bruit or JVD.   Cardiovascular:      Rate and Rhythm: Normal rate and regular rhythm.      Pulses:           Dorsalis pedis pulses are detected w/ Doppler on the right side and detected w/ Doppler on the left side.        Posterior tibial pulses are detected w/ Doppler on the right side and detected w/ Doppler on the left side.      Heart sounds: No murmur heard.  Pulmonary:      Effort: Pulmonary effort is normal. No respiratory distress.      Breath sounds: No stridor. No wheezing, rhonchi or rales.   Musculoskeletal:         General: No swelling, tenderness or deformity.      Right lower le+ Edema present.      Left lower le+ Edema present.      Comments:  No evidence of open ulceration, weeping or cellulitis bilaterally.   Feet:      Comments:   Triphasic hand-held dopplerable pulses of the bilateral dorsalis pedis and posterior tibial arteries.  Skin:     General: Skin is warm.      Capillary Refill: Capillary refill takes less than 2 seconds.      Coloration: Skin is not ashen.      Findings: No bruising, erythema, lesion, rash or wound.   Neurological:      Mental Status: She is alert and oriented to  person, place, and time.      Motor: No weakness.   Psychiatric:         Speech: Speech normal.         Behavior: Behavior normal. Behavior is cooperative.       Reticular/Spider veins noted:  RLE: anterior calf, medial calf, posterior calf, and lateral calf  LLE: anterior calf, medial calf, posterior calf, and lateral calf    Varicose veins noted:  RLE: none  LLE:  none    CEAP Classification  Clinical Signs: Class 4 - Skin changes ascribed to venous disease  Etiologic Classification: Primary  Anatomic distribution: Superficial  Pathophysiologic dysfunction: Reflux       Venous Clinical Severity Score  Pain:2=Daily, moderate activity limitation, occasional analgesics  Varicose Veins: 1=Few, scattered. Branch varicose veins  Venous Edema: 2=Afternoon edema, above ankle  Pigmentation: 0=None or focal, low intensity (tan)  Inflammation: 0=None  Induration: 0=None  Number of Active Ulcers: 0=0  Active Ulceration, Duration: 0=None  Active Ulcer Size: 0=None  Compressive Therapy: 3=Full compliance, stockings + elevation  Total Score: 8       III. ASSESSMENT & PLAN (MEDICAL DECISION MAKING)     1. Venous insufficiency (chronic) (peripheral)    2. Leg pain, bilateral    3. Edema, lower extremity        Assessment/Diagnosis and Plan:  The patient is doing well postprocedure and is encouraged to continue wearing postoperative compression for the next 2 weeks and then at least knee-high compression with daily leg exercise and leg elevation following that.  Follow-up in one-month.  No ultrasound unless she is having trouble.        Chris Hutchison, DO           No

## 2024-09-17 RX ORDER — SERTRALINE HYDROCHLORIDE 100 MG/1
100 TABLET, FILM COATED ORAL DAILY
Qty: 30 TABLET | Refills: 0 | Status: SHIPPED | OUTPATIENT
Start: 2024-09-17

## 2024-09-17 RX ORDER — SPIRONOLACTONE 25 MG/1
25 TABLET ORAL DAILY
Qty: 30 TABLET | Refills: 0 | Status: SHIPPED | OUTPATIENT
Start: 2024-09-17

## 2024-10-02 RX ORDER — LEVOTHYROXINE SODIUM 25 UG/1
25 TABLET ORAL DAILY
Qty: 30 TABLET | Refills: 0 | Status: SHIPPED | OUTPATIENT
Start: 2024-10-02

## 2024-10-21 ENCOUNTER — OFFICE VISIT (OUTPATIENT)
Dept: FAMILY MEDICINE | Facility: CLINIC | Age: 69
End: 2024-10-21
Payer: MEDICARE

## 2024-10-21 VITALS
SYSTOLIC BLOOD PRESSURE: 130 MMHG | OXYGEN SATURATION: 96 % | DIASTOLIC BLOOD PRESSURE: 84 MMHG | HEART RATE: 67 BPM | TEMPERATURE: 98 F | WEIGHT: 207.19 LBS | HEIGHT: 67 IN | BODY MASS INDEX: 32.52 KG/M2

## 2024-10-21 DIAGNOSIS — I10 HYPERTENSION, UNSPECIFIED TYPE: Primary | ICD-10-CM

## 2024-10-21 DIAGNOSIS — R82.90 ABNORMAL URINE ODOR: ICD-10-CM

## 2024-10-21 DIAGNOSIS — E03.9 HYPOTHYROIDISM, UNSPECIFIED TYPE: ICD-10-CM

## 2024-10-21 DIAGNOSIS — Z13.1 SCREENING FOR DIABETES MELLITUS: ICD-10-CM

## 2024-10-21 DIAGNOSIS — Z79.899 LONG-TERM USE OF HIGH-RISK MEDICATION: ICD-10-CM

## 2024-10-21 DIAGNOSIS — R10.11 RIGHT UPPER QUADRANT ABDOMINAL PAIN: ICD-10-CM

## 2024-10-21 DIAGNOSIS — N39.0 URINARY TRACT INFECTION WITHOUT HEMATURIA, SITE UNSPECIFIED: ICD-10-CM

## 2024-10-21 LAB
ALBUMIN SERPL BCP-MCNC: 3.7 G/DL (ref 3.5–5)
ALBUMIN/GLOB SERPL: 1.3 {RATIO}
ALP SERPL-CCNC: 94 U/L (ref 55–142)
ALT SERPL W P-5'-P-CCNC: 15 U/L (ref 13–56)
ANION GAP SERPL CALCULATED.3IONS-SCNC: 9 MMOL/L (ref 7–16)
AST SERPL W P-5'-P-CCNC: 25 U/L (ref 15–37)
BILIRUB SERPL-MCNC: 1.2 MG/DL (ref ?–1.2)
BILIRUB SERPL-MCNC: ABNORMAL MG/DL
BLOOD URINE, POC: ABNORMAL
BUN SERPL-MCNC: 12 MG/DL (ref 7–18)
BUN/CREAT SERPL: 10 (ref 6–20)
CALCIUM SERPL-MCNC: 9.3 MG/DL (ref 8.5–10.1)
CHLORIDE SERPL-SCNC: 108 MMOL/L (ref 98–107)
CHOLEST SERPL-MCNC: 198 MG/DL (ref 0–200)
CHOLEST/HDLC SERPL: 5 {RATIO}
CLARITY, UA: CLEAR
CO2 SERPL-SCNC: 28 MMOL/L (ref 21–32)
COLOR, UA: YELLOW
CREAT SERPL-MCNC: 1.17 MG/DL (ref 0.55–1.02)
EGFR (NO RACE VARIABLE) (RUSH/TITUS): 50 ML/MIN/1.73M2
EST. AVERAGE GLUCOSE BLD GHB EST-MCNC: 94 MG/DL
GLOBULIN SER-MCNC: 2.9 G/DL (ref 2–4)
GLUCOSE SERPL-MCNC: 105 MG/DL (ref 74–106)
GLUCOSE UR QL STRIP: NEGATIVE
HBA1C MFR BLD HPLC: 4.9 % (ref 4.5–6.6)
HDLC SERPL-MCNC: 40 MG/DL (ref 40–60)
KETONES UR QL STRIP: NEGATIVE
LDLC SERPL CALC-MCNC: 128 MG/DL
LDLC/HDLC SERPL: 3.2 {RATIO}
LEUKOCYTE ESTERASE URINE, POC: ABNORMAL
NITRITE, POC UA: NEGATIVE
NONHDLC SERPL-MCNC: 158 MG/DL
PH, POC UA: 6.5
POTASSIUM SERPL-SCNC: 4 MMOL/L (ref 3.5–5.1)
PROT SERPL-MCNC: 6.6 G/DL (ref 6.4–8.2)
PROTEIN, POC: NEGATIVE
SODIUM SERPL-SCNC: 141 MMOL/L (ref 136–145)
SPECIFIC GRAVITY, POC UA: 1.02
TRIGL SERPL-MCNC: 151 MG/DL (ref 35–150)
UROBILINOGEN, POC UA: NEGATIVE
VLDLC SERPL-MCNC: 30 MG/DL

## 2024-10-21 PROCEDURE — 83036 HEMOGLOBIN GLYCOSYLATED A1C: CPT | Mod: ,,, | Performed by: CLINICAL MEDICAL LABORATORY

## 2024-10-21 PROCEDURE — 80053 COMPREHEN METABOLIC PANEL: CPT | Mod: ,,, | Performed by: CLINICAL MEDICAL LABORATORY

## 2024-10-21 PROCEDURE — 81003 URINALYSIS AUTO W/O SCOPE: CPT | Mod: RHCUB | Performed by: FAMILY MEDICINE

## 2024-10-21 PROCEDURE — 85025 COMPLETE CBC W/AUTO DIFF WBC: CPT | Mod: ,,, | Performed by: CLINICAL MEDICAL LABORATORY

## 2024-10-21 PROCEDURE — 80061 LIPID PANEL: CPT | Mod: ,,, | Performed by: CLINICAL MEDICAL LABORATORY

## 2024-10-21 RX ORDER — SPIRONOLACTONE 25 MG/1
25 TABLET ORAL DAILY
Qty: 90 TABLET | Refills: 1 | Status: SHIPPED | OUTPATIENT
Start: 2024-10-21

## 2024-10-21 RX ORDER — OXYBUTYNIN CHLORIDE 5 MG/1
5 TABLET ORAL 2 TIMES DAILY
Qty: 180 TABLET | Refills: 1 | Status: SHIPPED | OUTPATIENT
Start: 2024-10-21

## 2024-10-21 RX ORDER — SULFAMETHOXAZOLE AND TRIMETHOPRIM 800; 160 MG/1; MG/1
1 TABLET ORAL DAILY
Qty: 30 TABLET | Refills: 0 | Status: SHIPPED | OUTPATIENT
Start: 2024-10-21 | End: 2024-11-20

## 2024-10-21 RX ORDER — WARFARIN SODIUM 5 MG/1
5 TABLET ORAL DAILY
Qty: 90 TABLET | Refills: 1 | Status: SHIPPED | OUTPATIENT
Start: 2024-10-21

## 2024-10-21 RX ORDER — POTASSIUM CHLORIDE 750 MG/1
10 TABLET, EXTENDED RELEASE ORAL DAILY
Qty: 90 TABLET | Refills: 1 | Status: SHIPPED | OUTPATIENT
Start: 2024-10-21

## 2024-10-21 RX ORDER — EZETIMIBE 10 MG/1
10 TABLET ORAL DAILY
Qty: 90 TABLET | Refills: 1 | Status: SHIPPED | OUTPATIENT
Start: 2024-10-21

## 2024-10-21 RX ORDER — LOSARTAN POTASSIUM 25 MG/1
25 TABLET ORAL DAILY
Qty: 90 TABLET | Refills: 1 | Status: SHIPPED | OUTPATIENT
Start: 2024-10-21

## 2024-10-21 RX ORDER — FUROSEMIDE 20 MG/1
20 TABLET ORAL DAILY
Qty: 90 TABLET | Refills: 1 | Status: SHIPPED | OUTPATIENT
Start: 2024-10-21

## 2024-10-21 RX ORDER — LEVOTHYROXINE SODIUM 25 UG/1
25 TABLET ORAL DAILY
Qty: 90 TABLET | Refills: 1 | Status: SHIPPED | OUTPATIENT
Start: 2024-10-21

## 2024-10-21 RX ORDER — RANOLAZINE 500 MG/1
500 TABLET, EXTENDED RELEASE ORAL 2 TIMES DAILY
Qty: 180 TABLET | Refills: 1 | Status: SHIPPED | OUTPATIENT
Start: 2024-10-21

## 2024-10-21 RX ORDER — SERTRALINE HYDROCHLORIDE 100 MG/1
100 TABLET, FILM COATED ORAL DAILY
Qty: 90 TABLET | Refills: 1 | Status: SHIPPED | OUTPATIENT
Start: 2024-10-21

## 2024-10-21 RX ORDER — CEFTRIAXONE 1 G/1
1 INJECTION, POWDER, FOR SOLUTION INTRAMUSCULAR; INTRAVENOUS
Status: COMPLETED | OUTPATIENT
Start: 2024-10-21 | End: 2024-10-21

## 2024-10-21 RX ORDER — LANOLIN ALCOHOL/MO/W.PET/CERES
1 CREAM (GRAM) TOPICAL DAILY
Qty: 90 TABLET | Refills: 1 | Status: SHIPPED | OUTPATIENT
Start: 2024-10-21

## 2024-10-21 RX ADMIN — CEFTRIAXONE 1 G: 1 INJECTION, POWDER, FOR SOLUTION INTRAMUSCULAR; INTRAVENOUS at 10:10

## 2024-10-22 ENCOUNTER — OFFICE VISIT (OUTPATIENT)
Dept: VASCULAR SURGERY | Facility: CLINIC | Age: 69
End: 2024-10-22
Payer: MEDICARE

## 2024-10-22 VITALS
RESPIRATION RATE: 18 BRPM | BODY MASS INDEX: 32.02 KG/M2 | HEART RATE: 78 BPM | HEIGHT: 67 IN | DIASTOLIC BLOOD PRESSURE: 85 MMHG | SYSTOLIC BLOOD PRESSURE: 132 MMHG | WEIGHT: 204 LBS

## 2024-10-22 DIAGNOSIS — R60.0 EDEMA, LOWER EXTREMITY: ICD-10-CM

## 2024-10-22 DIAGNOSIS — I87.2 VENOUS INSUFFICIENCY (CHRONIC) (PERIPHERAL): Primary | ICD-10-CM

## 2024-10-22 PROCEDURE — 99999 PR PBB SHADOW E&M-EST. PATIENT-LVL IV: CPT | Mod: PBBFAC,,, | Performed by: FAMILY MEDICINE

## 2024-10-22 PROCEDURE — 99214 OFFICE O/P EST MOD 30 MIN: CPT | Mod: PBBFAC | Performed by: FAMILY MEDICINE

## 2024-10-22 PROCEDURE — 99213 OFFICE O/P EST LOW 20 MIN: CPT | Mod: S$PBB,,, | Performed by: FAMILY MEDICINE

## 2024-10-22 NOTE — PROGRESS NOTES
VEIN CENTER CLINIC NOTE    Patient ID: Victoria Delarosa is a 68 y.o. female.    I. HISTORY     Chief Complaint:   Chief Complaint   Patient presents with    Follow-up     1M PA no test        HPI: Victoria Delarosa is a 68 y.o. female who presents today for one-month status post left small saphenous vein and left great saphenous vein non thermal adhesive ablation as well as a right distal great saphenous vein non thermal ablation.  The patient states she has done well since her procedures and noted less swelling, heaviness and pain of the bilateral lower extremities.  States she is very pleased with the outcome thus far.  Continues to wear compression socks most days along with leg exercise and leg elevation.      Clinical summary:  The patient was initially seen on 06/04/2024 by self referral for evaluation of  bilateral lower extremity edema and pain.  Symptoms are  persistent/worsening and began  greater than 5 years ago.  Location is bilateral  lower extremities below the knees. Symptoms are  worse at the end of the day.  History of venous interventions includes  vein procedures performed bilaterally by a physician in [facility] several years ago.    Denies family history of venous disease.   Patient also admits to history of left popliteal DVT and bilateral Pes about 4 years ago for which she was placed on warfarin and remains on it today.  Last INR was 1.8, she checks this at home.  The patient states she is worn compression stockings over the past 4 years, ever since her DVT and previous diagnosis of venous insufficiency.  She also walks regularly for exercise and elevate her legs when appropriate.  She feels despite these conservative measures her symptoms have worsened over the past several years and she would like to have underlying condition corrected if possible.    Bilateral complete venous reflux study 07/09/2024, shows no acute DVT bilaterally.  There post thrombotic changes within the left popliteal  vein as well as deep venous reflux.  The study also shows a well ablated proximal right great and right small saphenous veins.  Reflux is noted within the distal right great saphenous vein, left great saphenous vein and left small saphenous vein.    Venous Disease Medical Necessity Documentation Initial Visit Date:  06/04/2024 Return Check Date:    Have you ever had a rupture or bleed from a varicose vein in your leg(s)?              [] Yes  [x] No   [] Yes   [] No   Have you ever been diagnosed with phlebitis, cellulitis, or inflammation in the area of the varicose veins of  your leg(s)? PE, DVT  [x] Yes  [] No    [] Yes   [] No   Do you have darkened or inflamed skin on your legs?   [x] Yes   [] No   [] Yes   [] No   Do you have leg swelling?     [x] Yes   [] No   [] Yes   [] No   Do you have leg pain?   [x] Yes   [] No   [] Yes   [] No   If yes, describe the type of pain?    [x]   Stabbing [x]  Radiating [x]  Aching   [x]  Tightness [x]  Throbbing               [x]  Burning [x]  Cramping              Do you have leg discomfort?   [x] Yes   [] No   [] Yes   [] No   If yes, describe the type of discomfort?    [x]  Heaviness []  Fullness   [x]  Restlessness [x] Tired/Fatigued [] Itching              Have you ever worn compression hose?   [x] Yes   [] No   [] Yes   [] No   If yes, how long?    1 1/2 TO 2 YEARS       Do you elevate your legs while sitting?   [x] Yes   [] No   [] Yes   [] No   Does venous disease (varicose veins, ulcers, skin changes, leg pain/swelling) interfere with your daily life?  If yes, check activities you are limited or unable to do.    []  Shower  [x]   Walk  [x]  Exercise  [x] Play with children/grandchildren  [x]  Shop [] Work [x] Stand for any period of time [x] Sleep                               [x] Sitting for an extended period of time.           [x] Yes   [] No   [] Yes   [] No   Do you exercise/have you tried to exercise (i.e.  Walk our participate in a regular exercise routine)?   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    BMI 32.0-32.9,adult     Bronchitis     CHF (congestive heart failure)     Cobalamin deficiency     Constipation 05/22/2023    Contusion of left knee 08/12/2022    Contusion of right knee 08/12/2022    Deep vein thrombosis     Depressive disorder     Diarrhea 12/07/2022    Fall 08/12/2022    General anesthetics causing adverse effect in therapeutic use     GERD (gastroesophageal reflux disease)     Head injury 08/12/2022    Heart attack     20 yrs ago    Hypertension     Hypothyroidism     Lower extremity edema     Neuropathy     Osteoarthritis of both knees     Pain in left leg     Pain in right leg     Peripheral edema     Pulmonary embolism 2017    Stroke     Varicose veins of bilateral lower extremities with pain     Vitamin D deficiency         Past Surgical History:   Procedure Laterality Date    ABLATION, CHEMICAL SEALANT, VARICOSE VEIN Left 8/30/2024    Procedure: Left GSV VenaSeal Ablation;  Surgeon: Chris Hutchison DO;  Location: [facility];  Service: Peripheral Vascular;  Laterality: Left;    ABLATION, CHEMICAL SEALANT, VARICOSE VEIN Left 9/6/2024    Procedure: Left SSV VenaSeal Ablation;  Surgeon: Chris Hutchison DO;  Location: [facility];  Service: Peripheral Vascular;  Laterality: Left;    BILATERAL TUBAL LIGATION      CARDIAC CATHETERIZATION      COLONOSCOPY      EYE SURGERY      knee scope Left     SCLEROTHERAPY WITH ULTRASOUND GUIDANCE Right 08/29/2024    Right Distal GSV Varithena Ablation performed by Dr. Misael Hutchison    THYROIDECTOMY, PARTIAL      TONSILLECTOMY, ADENOIDECTOMY      TOTAL KNEE ARTHROPLASTY Bilateral     TOTAL KNEE ARTHROPLASTY Right     TUBAL LIGATION      VAGINAL DELIVERY      x 3       Social History     Tobacco Use   Smoking Status Never     Passive exposure: Past (brothers and sisters.)   Smokeless Tobacco Never         Current Outpatient Medications:     cetirizine (ZYRTEC) 10 MG tablet, Take 10 mg by mouth once daily., Disp: , Rfl:     cyanocobalamin 1,000 mcg/mL injection, INJECT 1 ML EVERY MONTH, Disp: 1 mL, Rfl: 11    ezetimibe (ZETIA) 10 mg tablet, Take 1 tablet (10 mg total) by mouth once daily., Disp: 90 tablet, Rfl: 1    furosemide (LASIX) 20 MG tablet, Take 1 tablet (20 mg total) by mouth once daily., Disp: 90 tablet, Rfl: 1    gabapentin (NEURONTIN) 100 MG capsule, Take 100 mg by mouth every evening., Disp: , Rfl:     levothyroxine (SYNTHROID) 25 MCG tablet, Take 1 tablet (25 mcg total) by mouth once daily., Disp: 90 tablet, Rfl: 1    losartan (COZAAR) 25 MG tablet, Take 1 tablet (25 mg total) by mouth once daily., Disp: 90 tablet, Rfl: 1    magnesium oxide (MAG-OX) 400 mg (241.3 mg magnesium) tablet, Take 1 tablet (400 mg total) by mouth once daily., Disp: 90 tablet, Rfl: 1    oxybutynin (DITROPAN) 5 MG Tab, Take 1 tablet (5 mg total) by mouth 2 (two) times daily., Disp: 180 tablet, Rfl: 1    potassium chloride (KLOR-CON) 10 MEQ TbSR, Take 1 tablet (10 mEq total) by mouth once daily., Disp: 90 tablet, Rfl: 1    ranolazine (RANEXA) 500 MG Tb12, Take 1 tablet (500 mg total) by mouth 2 (two) times daily., Disp: 180 tablet, Rfl: 1    sertraline (ZOLOFT) 100 MG tablet, Take 1 tablet (100 mg total) by mouth once daily., Disp: 90 tablet, Rfl: 1    spironolactone (ALDACTONE) 25 MG tablet, Take 1 tablet (25 mg total) by mouth once daily., Disp: 90 tablet, Rfl: 1    sulfamethoxazole-trimethoprim 800-160mg (BACTRIM DS) 800-160 mg Tab, Take 1 tablet by mouth once daily., Disp: 30 tablet, Rfl: 0    warfarin (COUMADIN) 5 MG tablet, Take 1 tablet (5 mg total) by mouth Daily., Disp: 90 tablet, Rfl: 1  No current facility-administered medications for this visit.    Review of Systems   Constitutional:  Negative for activity change, chills, diaphoresis,  fatigue and fever.   Respiratory:  Negative for cough and shortness of breath.    Cardiovascular:  Positive for leg swelling. Negative for chest pain and claudication.        Hyperpigmentation LE   Gastrointestinal:  Negative for nausea and vomiting.   Musculoskeletal:  Positive for leg pain. Negative for joint swelling.   Integumentary:  Negative for rash and wound.   Neurological:  Negative for weakness and numbness.          II. PHYSICAL EXAM     Physical Exam  Constitutional:       General: She is awake. She is not in acute distress.     Appearance: Normal appearance. She is obese. She is not ill-appearing or toxic-appearing.   HENT:      Head: Normocephalic and atraumatic.   Eyes:      Extraocular Movements: Extraocular movements intact.      Conjunctiva/sclera: Conjunctivae normal.      Pupils: Pupils are equal, round, and reactive to light.   Neck:      Vascular: No carotid bruit or JVD.   Cardiovascular:      Rate and Rhythm: Normal rate and regular rhythm.      Pulses:           Dorsalis pedis pulses are detected w/ Doppler on the right side and detected w/ Doppler on the left side.        Posterior tibial pulses are detected w/ Doppler on the right side and detected w/ Doppler on the left side.      Heart sounds: No murmur heard.  Pulmonary:      Effort: Pulmonary effort is normal. No respiratory distress.      Breath sounds: No stridor. No wheezing, rhonchi or rales.   Musculoskeletal:         General: No swelling, tenderness or deformity.      Right lower le+ Edema present.      Left lower le+ Edema present.      Comments:  No evidence of open ulceration, weeping or cellulitis bilaterally.   Feet:      Comments:   Triphasic hand-held dopplerable pulses of the bilateral dorsalis pedis and posterior tibial arteries.  Skin:     General: Skin is warm.      Capillary Refill: Capillary refill takes less than 2 seconds.      Coloration: Skin is not ashen.      Findings: No bruising, erythema, lesion,  rash or wound.   Neurological:      Mental Status: She is alert and oriented to person, place, and time.      Motor: No weakness.   Psychiatric:         Speech: Speech normal.         Behavior: Behavior normal. Behavior is cooperative.       Reticular/Spider veins noted:  RLE: anterior calf, medial calf, posterior calf, and lateral calf  LLE: anterior calf, medial calf, posterior calf, and lateral calf    Varicose veins noted:  RLE: none  LLE:  none    CEAP Classification  Clinical Signs: Class 4 - Skin changes ascribed to venous disease  Etiologic Classification: Primary  Anatomic distribution: Superficial  Pathophysiologic dysfunction: Reflux       Venous Clinical Severity Score  Pain:2=Daily, moderate activity limitation, occasional analgesics  Varicose Veins: 1=Few, scattered. Branch varicose veins  Venous Edema: 2=Afternoon edema, above ankle  Pigmentation: 0=None or focal, low intensity (tan)  Inflammation: 0=None  Induration: 0=None  Number of Active Ulcers: 0=0  Active Ulceration, Duration: 0=None  Active Ulcer Size: 0=None  Compressive Therapy: 3=Full compliance, stockings + elevation  Total Score: 8       III. ASSESSMENT & PLAN (MEDICAL DECISION MAKING)     1. Venous insufficiency (chronic) (peripheral)    2. Edema, lower extremity        Assessment/Diagnosis and Plan:  The patient is doing well postprocedure and is encouraged to continue wearing knee-high compression with daily leg exercise and leg elevation.  Follow-up in 2 months for a three-month post ablation  No ultrasound unless she is having trouble.        Chris Hutchison, DO

## 2024-10-23 LAB
BASOPHILS # BLD AUTO: 0.08 K/UL (ref 0–0.2)
BASOPHILS NFR BLD AUTO: 1.2 % (ref 0–1)
DIFFERENTIAL METHOD BLD: ABNORMAL
EOSINOPHIL # BLD AUTO: 0.16 K/UL (ref 0–0.5)
EOSINOPHIL NFR BLD AUTO: 2.5 % (ref 1–4)
ERYTHROCYTE [DISTWIDTH] IN BLOOD BY AUTOMATED COUNT: 13.5 % (ref 11.5–14.5)
HCT VFR BLD AUTO: 42.8 % (ref 38–47)
HGB BLD-MCNC: 14.2 G/DL (ref 12–16)
IMM GRANULOCYTES # BLD AUTO: 0.01 K/UL (ref 0–0.04)
IMM GRANULOCYTES NFR BLD: 0.2 % (ref 0–0.4)
LYMPHOCYTES # BLD AUTO: 2.36 K/UL (ref 1–4.8)
LYMPHOCYTES NFR BLD AUTO: 36.6 % (ref 27–41)
MCH RBC QN AUTO: 32.1 PG (ref 27–31)
MCHC RBC AUTO-ENTMCNC: 33.2 G/DL (ref 32–36)
MCV RBC AUTO: 96.8 FL (ref 80–96)
MONOCYTES # BLD AUTO: 0.61 K/UL (ref 0–0.8)
MONOCYTES NFR BLD AUTO: 9.5 % (ref 2–6)
MPC BLD CALC-MCNC: 10.7 FL (ref 9.4–12.4)
NEUTROPHILS # BLD AUTO: 3.23 K/UL (ref 1.8–7.7)
NEUTROPHILS NFR BLD AUTO: 50 % (ref 53–65)
NRBC # BLD AUTO: 0 X10E3/UL
NRBC, AUTO (.00): 0 %
PLATELET # BLD AUTO: 190 K/UL (ref 150–400)
RBC # BLD AUTO: 4.42 M/UL (ref 4.2–5.4)
WBC # BLD AUTO: 6.45 K/UL (ref 4.5–11)

## 2024-12-10 ENCOUNTER — OFFICE VISIT (OUTPATIENT)
Dept: FAMILY MEDICINE | Facility: CLINIC | Age: 69
End: 2024-12-10
Payer: MEDICARE

## 2024-12-10 VITALS
TEMPERATURE: 99 F | DIASTOLIC BLOOD PRESSURE: 82 MMHG | OXYGEN SATURATION: 98 % | SYSTOLIC BLOOD PRESSURE: 128 MMHG | HEART RATE: 72 BPM | BODY MASS INDEX: 32.24 KG/M2 | HEIGHT: 67 IN | WEIGHT: 205.38 LBS

## 2024-12-10 DIAGNOSIS — L40.9 PSORIASIS: ICD-10-CM

## 2024-12-10 DIAGNOSIS — R30.0 DYSURIA: Primary | ICD-10-CM

## 2024-12-10 DIAGNOSIS — N30.00 ACUTE CYSTITIS WITHOUT HEMATURIA: ICD-10-CM

## 2024-12-10 LAB
BILIRUB SERPL-MCNC: ABNORMAL MG/DL
BLOOD URINE, POC: ABNORMAL
CLARITY, UA: ABNORMAL
COLOR, UA: YELLOW
GLUCOSE UR QL STRIP: ABNORMAL
KETONES UR QL STRIP: ABNORMAL
LEUKOCYTE ESTERASE URINE, POC: ABNORMAL
NITRITE, POC UA: POSITIVE
PH, POC UA: 6.5
PROTEIN, POC: ABNORMAL
SPECIFIC GRAVITY, POC UA: 1.01
UROBILINOGEN, POC UA: 0.2

## 2024-12-10 PROCEDURE — 96372 THER/PROPH/DIAG INJ SC/IM: CPT | Mod: ,,, | Performed by: FAMILY MEDICINE

## 2024-12-10 PROCEDURE — 99213 OFFICE O/P EST LOW 20 MIN: CPT | Mod: ,,, | Performed by: FAMILY MEDICINE

## 2024-12-10 RX ORDER — CEFTRIAXONE 1 G/1
1 INJECTION, POWDER, FOR SOLUTION INTRAMUSCULAR; INTRAVENOUS
Status: COMPLETED | OUTPATIENT
Start: 2024-12-10 | End: 2024-12-10

## 2024-12-10 RX ORDER — NITROFURANTOIN 25; 75 MG/1; MG/1
100 CAPSULE ORAL DAILY
Qty: 30 CAPSULE | Refills: 5 | Status: SHIPPED | OUTPATIENT
Start: 2024-12-10

## 2024-12-10 RX ORDER — CIPROFLOXACIN 250 MG/1
250 TABLET, FILM COATED ORAL 2 TIMES DAILY
Qty: 14 TABLET | Refills: 0 | Status: SHIPPED | OUTPATIENT
Start: 2024-12-10

## 2024-12-10 RX ADMIN — CEFTRIAXONE 1 G: 1 INJECTION, POWDER, FOR SOLUTION INTRAMUSCULAR; INTRAVENOUS at 01:12

## 2024-12-10 NOTE — PROGRESS NOTES
Tim Burgos MD   [facility]  [address] [place] [address]     PATIENT NAME: Victoria Delarosa  : 1955  DATE: 12/10/24  MRN: [id number]      Billing Provider: Tim Burgos MD  Level of Service: DE OFFICE/OUTPT VISIT, EST, LEVL III, 20-29 MIN  Patient PCP Information       Provider PCP Type    Tim Burgos MD General            Reason for Visit / Chief Complaint: Dysuria (Painful with urination, milky color to urine, feels the urge to urinate, but unable to go at times. Patient states she is unable to take bactrim due to her tongue burning.) and Skin Problem (Repots itchy spots that pop up in her scalp, and will not go away.)         History of Present Illness / Problem Focused Workflow     Victoria Delarosa presents to the clinic with Dysuria (Painful with urination, milky color to urine, feels the urge to urinate, but unable to go at times. Patient states she is unable to take bactrim due to her tongue burning.) and Skin Problem (Repots itchy spots that pop up in her scalp, and will not go away.)     HPI    Review of Systems     Review of Systems   Constitutional:  Negative for activity change, appetite change, fatigue and fever.   HENT:  Negative for nasal congestion, ear pain, hearing loss, sinus pressure/congestion and sore throat.    Respiratory:  Negative for cough, chest tightness and shortness of breath.    Cardiovascular:  Negative for chest pain and palpitations.   Gastrointestinal:  Negative for abdominal pain and fecal incontinence.   Genitourinary:  Positive for difficulty urinating, dysuria, flank pain and hematuria. Negative for bladder incontinence.   Musculoskeletal:  Negative for arthralgias.   Integumentary:  Negative for rash.   Neurological:  Negative for dizziness and headaches.        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 side    BMI 32.0-32.9,adult     Bronchitis     CHF (congestive heart failure)     Cobalamin deficiency     Constipation 05/22/2023    Contusion of left knee 08/12/2022    Contusion of right knee 08/12/2022    Deep vein thrombosis     Depressive disorder     Diarrhea 12/07/2022    Fall 08/12/2022    General anesthetics causing adverse effect in therapeutic use     GERD (gastroesophageal reflux disease)     Head injury 08/12/2022    Heart attack     20 yrs ago    Hypertension     Hypothyroidism     Lower extremity edema     Neuropathy     Osteoarthritis of both knees     Pain in left leg     Pain in right leg     Peripheral edema     Pulmonary embolism 2017    Stroke     Varicose veins of bilateral lower extremities with pain     Vitamin D deficiency        Past Surgical History:   Procedure Laterality Date    ABLATION, CHEMICAL SEALANT, VARICOSE VEIN Left 8/30/2024    Procedure: Left GSV VenaSeal Ablation;  Surgeon: Chris Hutchison DO;  Location: [facility];  Service: Peripheral Vascular;  Laterality: Left;    ABLATION, CHEMICAL SEALANT, VARICOSE VEIN Left 9/6/2024    Procedure: Left SSV VenaSeal Ablation;  Surgeon: Chris Hutchison DO;  Location: [facility];  Service: Peripheral Vascular;  Laterality: Left;    BILATERAL TUBAL LIGATION      CARDIAC CATHETERIZATION      COLONOSCOPY      EYE SURGERY      knee scope Left     SCLEROTHERAPY WITH ULTRASOUND GUIDANCE Right 08/29/2024    Right Distal GSV Varithena Ablation performed by Dr. Misael Hutchison    THYROIDECTOMY, PARTIAL      TONSILLECTOMY, ADENOIDECTOMY      TOTAL KNEE ARTHROPLASTY Bilateral     TOTAL KNEE ARTHROPLASTY Right     TUBAL LIGATION      VAGINAL DELIVERY      x 3       Social History  Victoria Delarosa  reports that she has never smoked. She has been exposed to tobacco smoke. She has never used smokeless tobacco. She reports that she does not drink alcohol and does not use  drugs.    Family History  Victoria Delarosa  family history includes Arthritis in her brother and sister; Drug abuse in her son; Heart disease in her brother, mother, and sister; Hypertension in her mother; Liver disease in her father; No Known Problems in her brother, daughter, and son; Stroke in her brother.    Medications and Allergies     Medications  Outpatient Medications Marked as Taking for the 12/10/24 encounter (Office Visit) with Tim Burgos MD   Medication Sig Dispense Refill    cetirizine (ZYRTEC) 10 MG tablet Take 10 mg by mouth once daily.      cyanocobalamin 1,000 mcg/mL injection INJECT 1 ML EVERY MONTH 1 mL 11    ezetimibe (ZETIA) 10 mg tablet Take 1 tablet (10 mg total) by mouth once daily. 90 tablet 1    furosemide (LASIX) 20 MG tablet Take 1 tablet (20 mg total) by mouth once daily. 90 tablet 1    gabapentin (NEURONTIN) 100 MG capsule Take 100 mg by mouth every evening.      levothyroxine (SYNTHROID) 25 MCG tablet Take 1 tablet (25 mcg total) by mouth once daily. 90 tablet 1    losartan (COZAAR) 25 MG tablet Take 1 tablet (25 mg total) by mouth once daily. 90 tablet 1    magnesium oxide (MAG-OX) 400 mg (241.3 mg magnesium) tablet Take 1 tablet (400 mg total) by mouth once daily. 90 tablet 1    oxybutynin (DITROPAN) 5 MG Tab Take 1 tablet (5 mg total) by mouth 2 (two) times daily. 180 tablet 1    potassium chloride (KLOR-CON) 10 MEQ TbSR Take 1 tablet (10 mEq total) by mouth once daily. 90 tablet 1    ranolazine (RANEXA) 500 MG Tb12 Take 1 tablet (500 mg total) by mouth 2 (two) times daily. 180 tablet 1    sertraline (ZOLOFT) 100 MG tablet Take 1 tablet (100 mg total) by mouth once daily. 90 tablet 1    spironolactone (ALDACTONE) 25 MG tablet Take 1 tablet (25 mg total) by mouth once daily. 90 tablet 1    warfarin (COUMADIN) 5 MG tablet Take 1 tablet (5 mg total) by mouth Daily. 90 tablet 1       Allergies  Review of patient's allergies indicates:   Allergen Reactions    Bactrim  [sulfamethoxazole-trimethoprim]      Patient states this medication made her tongue feel like it was on fire and dropped her INR.     Lisinopril      Cough      Statins-hmg-coa reductase inhibitors Hives and Itching       Physical Examination     Vitals:    12/10/24 1312   BP: 128/82   Pulse: 72   Temp: 98.6 °F (37 °C)     Physical Exam  Constitutional:       General: She is not in acute distress.     Appearance: She is not ill-appearing.   HENT:      Head: Normocephalic and atraumatic.      Right Ear: Tympanic membrane and ear canal normal.      Left Ear: Tympanic membrane and ear canal normal.      Nose: Nose normal. No congestion or rhinorrhea.   Eyes:      Pupils: Pupils are equal, round, and reactive to light.   Cardiovascular:      Rate and Rhythm: Normal rate and regular rhythm.      Pulses: Normal pulses.      Heart sounds: No murmur heard.  Pulmonary:      Effort: No respiratory distress.      Breath sounds: No wheezing, rhonchi or rales.   Abdominal:      General: Bowel sounds are normal.      Palpations: Abdomen is soft.      Tenderness: There is no abdominal tenderness.      Hernia: No hernia is present.   Musculoskeletal:      Cervical back: Normal range of motion and neck supple.   Lymphadenopathy:      Cervical: No cervical adenopathy.   Skin:     General: Skin is warm and dry.      Findings: Rash (scalp) present.   Neurological:      Mental Status: She is alert.   Psychiatric:         Behavior: Behavior normal.         Thought Content: Thought content normal.          Assessment and Plan (including Health Maintenance)   :    Plan:         Health Maintenance Due   Topic Date Due    TETANUS VACCINE  Never done    RSV Vaccine (Age 60+ and Pregnant patients) (1 - Risk 60-74 years 1-dose series) Never done    Pneumococcal Vaccines (Age 50+) (3 of 3 - PPSV23, PCV20 or PCV21) 07/30/2019    COVID-19 Vaccine (3 - Moderna risk series) 01/28/2022    Shingles Vaccine (2 of 2) 03/06/2024    Influenza Vaccine (1)  09/01/2024       Problem List Items Addressed This Visit    None  Visit Diagnoses       Dysuria    -  Primary    Relevant Orders    POCT URINALYSIS W/O SCOPE (Completed)    Acute cystitis without hematuria        Psoriasis              Dysuria  -     POCT URINALYSIS W/O SCOPE    Acute cystitis without hematuria    Psoriasis    Other orders  -     ciprofloxacin HCl (CIPRO) 250 MG tablet; Take 1 tablet (250 mg total) by mouth 2 (two) times daily.  Dispense: 14 tablet; Refill: 0  -     nitrofurantoin, macrocrystal-monohydrate, (MACROBID) 100 MG capsule; Take 1 capsule (100 mg total) by mouth once daily.  Dispense: 30 capsule; Refill: 5  -     cefTRIAXone injection 1 g  -     coal tar (NEUTROGENA T-GEL) 0.5 % shampoo; Apply topically nightly as needed.  Dispense: 240 mL; Refill: 12       Health Maintenance Topics with due status: Not Due       Topic Last Completion Date    Colorectal Cancer Screening 11/28/2022    DEXA Scan 08/10/2023    Mammogram 06/04/2024    Lipid Panel 10/21/2024       Procedures     Future Appointments   Date Time Provider Department Center   1/7/2025  8:45 AM Chris Hutchison DO OB [facility]   7/22/2025  8:20 AM [facility] M[facility]MO[facility] M[facility]   8/19/2025  8:30 AM Philly Gooden HOSSEIN [facility]        No follow-ups on file.       Signature:  Tim Burgos MD  [facility]  [address] [place] [address]  [phone] Phone  [phone] Fax    Date of encounter: 12/10/24

## 2025-01-07 ENCOUNTER — OFFICE VISIT (OUTPATIENT)
Dept: VASCULAR SURGERY | Facility: CLINIC | Age: 70
End: 2025-01-07
Payer: MEDICARE

## 2025-01-07 VITALS
SYSTOLIC BLOOD PRESSURE: 130 MMHG | BODY MASS INDEX: 31.65 KG/M2 | RESPIRATION RATE: 16 BRPM | HEIGHT: 67 IN | DIASTOLIC BLOOD PRESSURE: 90 MMHG | HEART RATE: 70 BPM | WEIGHT: 201.63 LBS

## 2025-01-07 DIAGNOSIS — R60.0 EDEMA, LOWER EXTREMITY: ICD-10-CM

## 2025-01-07 DIAGNOSIS — I87.2 VENOUS INSUFFICIENCY (CHRONIC) (PERIPHERAL): Primary | ICD-10-CM

## 2025-01-07 DIAGNOSIS — M79.604 LEG PAIN, BILATERAL: ICD-10-CM

## 2025-01-07 DIAGNOSIS — M79.605 LEG PAIN, BILATERAL: ICD-10-CM

## 2025-01-07 PROCEDURE — 99214 OFFICE O/P EST MOD 30 MIN: CPT | Mod: S$PBB,,, | Performed by: FAMILY MEDICINE

## 2025-01-07 PROCEDURE — 99999 PR PBB SHADOW E&M-EST. PATIENT-LVL V: CPT | Mod: PBBFAC,,, | Performed by: FAMILY MEDICINE

## 2025-01-07 PROCEDURE — 99215 OFFICE O/P EST HI 40 MIN: CPT | Mod: PBBFAC | Performed by: FAMILY MEDICINE

## 2025-01-07 NOTE — PROGRESS NOTES
VEIN CENTER CLINIC NOTE    Patient ID: Victoria Delarosa is a 69 y.o. female.    I. HISTORY     Chief Complaint:   Chief Complaint   Patient presents with    Follow-up     EXAM ROOM 3.  3M S/P RT DISTAL GSV VARITHENA  AB, LT GSV VENASEAL AB AND LT SSV VENASEAL AB        HPI: Victoria Delarosa is a 69 y.o. female who presents today for 3-month status post left small saphenous vein and left great saphenous vein non thermal adhesive ablation as well as a right distal great saphenous vein non thermal ablation.  The patient states she has done well since her procedures and noted less swelling, heaviness and pain of the bilateral lower extremities.  Her only complaint today is continued swelling of her toes with some mild discoloration/blueing.  Continues to wear compression socks most days along with leg exercise and leg elevation.    Clinical summary:  The patient was initially seen on 06/04/2024 by self referral for evaluation of  bilateral lower extremity edema and pain.  Symptoms are  persistent/worsening and began  greater than 5 years ago.  Location is bilateral  lower extremities below the knees. Symptoms are  worse at the end of the day.  History of venous interventions includes  vein procedures performed bilaterally by a physician in [facility] several years ago.    Denies family history of venous disease.   Patient also admits to history of left popliteal DVT and bilateral Pes about 4 years ago for which she was placed on warfarin and remains on it today.  Last INR was 1.8, she checks this at home.  The patient states she is worn compression stockings over the past 4 years, ever since her DVT and previous diagnosis of venous insufficiency.  She also walks regularly for exercise and elevate her legs when appropriate.  She feels despite these conservative measures her symptoms have worsened over the past several years and she would like to have underlying condition corrected if possible.    Bilateral complete venous  reflux study 07/09/2024, shows no acute DVT bilaterally.  There post thrombotic changes within the left popliteal vein as well as deep venous reflux.  The study also shows a well ablated proximal right great and right small saphenous veins.  Reflux is noted within the distal right great saphenous vein, left great saphenous vein and left small saphenous vein.    Venous Disease Medical Necessity Documentation Initial Visit Date:  06/04/2024 Return Check Date:    Have you ever had a rupture or bleed from a varicose vein in your leg(s)?              [] Yes  [x] No   [] Yes   [] No   Have you ever been diagnosed with phlebitis, cellulitis, or inflammation in the area of the varicose veins of  your leg(s)? PE, DVT  [x] Yes  [] No    [] Yes   [] No   Do you have darkened or inflamed skin on your legs?   [x] Yes   [] No   [] Yes   [] No   Do you have leg swelling?     [x] Yes   [] No   [] Yes   [] No   Do you have leg pain?   [x] Yes   [] No   [] Yes   [] No   If yes, describe the type of pain?    [x]   Stabbing [x]  Radiating [x]  Aching   [x]  Tightness [x]  Throbbing               [x]  Burning [x]  Cramping              Do you have leg discomfort?   [x] Yes   [] No   [] Yes   [] No   If yes, describe the type of discomfort?    [x]  Heaviness []  Fullness   [x]  Restlessness [x] Tired/Fatigued [] Itching              Have you ever worn compression hose?   [x] Yes   [] No   [] Yes   [] No   If yes, how long?    1 1/2 TO 2 YEARS       Do you elevate your legs while sitting?   [x] Yes   [] No   [] Yes   [] No   Does venous disease (varicose veins, ulcers, skin changes, leg pain/swelling) interfere with your daily life?  If yes, check activities you are limited or unable to do.    []  Shower  [x]   Walk  [x]  Exercise  [x] Play with children/grandchildren  [x]  Shop [] Work [x] Stand for any period of time [x] Sleep                               [x] Sitting for an extended period of time.           [x] Yes   [] No   [] Yes    [] No   Do you exercise/have you tried to exercise (i.e.  Walk our participate in a regular exercise routine)?  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    BMI 32.0-32.9,adult     Bronchitis     CHF (congestive heart failure)     Cobalamin deficiency     Constipation 05/22/2023    Contusion of left knee 08/12/2022    Contusion of right knee 08/12/2022    Deep vein thrombosis     Depressive disorder     Diarrhea 12/07/2022    Fall 08/12/2022    General anesthetics causing adverse effect in therapeutic use     GERD (gastroesophageal reflux disease)     Head injury 08/12/2022    Heart attack     20 yrs ago    Hypertension     Hypothyroidism     Lower extremity edema     Neuropathy     Osteoarthritis of both knees     Pain in left leg     Pain in right leg     Peripheral edema     Pulmonary embolism 2017    Stroke     Varicose veins of bilateral lower extremities with pain     Vitamin D deficiency         Past Surgical History:   Procedure Laterality Date    ABLATION, CHEMICAL SEALANT, VARICOSE VEIN Left 8/30/2024    Procedure: Left GSV VenaSeal Ablation;  Surgeon: Chris Hutchison DO;  Location: [facility];  Service: Peripheral Vascular;  Laterality: Left;    ABLATION, CHEMICAL SEALANT, VARICOSE VEIN Left 9/6/2024    Procedure: Left SSV VenaSeal Ablation;  Surgeon: Chris Hutchison DO;  Location: [facility];  Service: Peripheral Vascular;  Laterality: Left;    BILATERAL TUBAL LIGATION      CARDIAC CATHETERIZATION      COLONOSCOPY      EYE SURGERY      knee scope Left     SCLEROTHERAPY WITH ULTRASOUND GUIDANCE Right 08/29/2024    Right Distal GSV Varithena Ablation performed by Dr. Misael Hutchison    THYROIDECTOMY, PARTIAL      TONSILLECTOMY, ADENOIDECTOMY      TOTAL KNEE ARTHROPLASTY Bilateral     TOTAL KNEE ARTHROPLASTY  Right     TUBAL LIGATION      VAGINAL DELIVERY      x 3       Social History     Tobacco Use   Smoking Status Never    Passive exposure: Past (brothers and sisters.)   Smokeless Tobacco Never         Current Outpatient Medications:     cetirizine (ZYRTEC) 10 MG tablet, Take 10 mg by mouth once daily., Disp: , Rfl:     coal tar (NEUTROGENA T-GEL) 0.5 % shampoo, Apply topically nightly as needed., Disp: 240 mL, Rfl: 12    cyanocobalamin 1,000 mcg/mL injection, INJECT 1 ML EVERY MONTH, Disp: 1 mL, Rfl: 11    ezetimibe (ZETIA) 10 mg tablet, Take 1 tablet (10 mg total) by mouth once daily., Disp: 90 tablet, Rfl: 1    furosemide (LASIX) 20 MG tablet, Take 1 tablet (20 mg total) by mouth once daily., Disp: 90 tablet, Rfl: 1    gabapentin (NEURONTIN) 100 MG capsule, Take 100 mg by mouth every evening., Disp: , Rfl:     levothyroxine (SYNTHROID) 25 MCG tablet, Take 1 tablet (25 mcg total) by mouth once daily., Disp: 90 tablet, Rfl: 1    losartan (COZAAR) 25 MG tablet, Take 1 tablet (25 mg total) by mouth once daily., Disp: 90 tablet, Rfl: 1    magnesium oxide (MAG-OX) 400 mg (241.3 mg magnesium) tablet, Take 1 tablet (400 mg total) by mouth once daily., Disp: 90 tablet, Rfl: 1    nitrofurantoin, macrocrystal-monohydrate, (MACROBID) 100 MG capsule, Take 1 capsule (100 mg total) by mouth once daily., Disp: 30 capsule, Rfl: 5    oxybutynin (DITROPAN) 5 MG Tab, Take 1 tablet (5 mg total) by mouth 2 (two) times daily., Disp: 180 tablet, Rfl: 1    potassium chloride (KLOR-CON) 10 MEQ TbSR, Take 1 tablet (10 mEq total) by mouth once daily., Disp: 90 tablet, Rfl: 1    ranolazine (RANEXA) 500 MG Tb12, Take 1 tablet (500 mg total) by mouth 2 (two) times daily., Disp: 180 tablet, Rfl: 1    sertraline (ZOLOFT) 100 MG tablet, Take 1 tablet (100 mg total) by mouth once daily., Disp: 90 tablet, Rfl: 1    spironolactone (ALDACTONE) 25 MG tablet, Take 1 tablet (25 mg total) by mouth once daily., Disp: 90 tablet, Rfl: 1    warfarin (COUMADIN)  5 MG tablet, Take 1 tablet (5 mg total) by mouth Daily., Disp: 90 tablet, Rfl: 1    ciprofloxacin HCl (CIPRO) 250 MG tablet, Take 1 tablet (250 mg total) by mouth 2 (two) times daily. (Patient not taking: Reported on 2025), Disp: 14 tablet, Rfl: 0    Review of Systems   Constitutional:  Negative for activity change, chills, diaphoresis, fatigue and fever.   Respiratory:  Negative for cough and shortness of breath.    Cardiovascular:  Positive for leg swelling. Negative for chest pain and claudication.        Hyperpigmentation LE   Gastrointestinal:  Negative for nausea and vomiting.   Musculoskeletal:  Positive for leg pain. Negative for joint swelling.   Integumentary:  Negative for rash and wound.   Neurological:  Negative for weakness and numbness.          II. PHYSICAL EXAM     Physical Exam  Constitutional:       General: She is awake. She is not in acute distress.     Appearance: Normal appearance. She is obese. She is not ill-appearing or toxic-appearing.   HENT:      Head: Normocephalic and atraumatic.   Eyes:      Extraocular Movements: Extraocular movements intact.      Conjunctiva/sclera: Conjunctivae normal.      Pupils: Pupils are equal, round, and reactive to light.   Neck:      Vascular: No carotid bruit or JVD.   Cardiovascular:      Rate and Rhythm: Normal rate and regular rhythm.      Pulses:           Dorsalis pedis pulses are detected w/ Doppler on the right side and detected w/ Doppler on the left side.        Posterior tibial pulses are detected w/ Doppler on the right side and detected w/ Doppler on the left side.      Heart sounds: No murmur heard.  Pulmonary:      Effort: Pulmonary effort is normal. No respiratory distress.      Breath sounds: No stridor. No wheezing, rhonchi or rales.   Musculoskeletal:         General: No swelling, tenderness or deformity.      Right lower le+ Edema present.      Left lower le+ Edema present.      Comments:  No evidence of open ulceration,  weeping or cellulitis bilaterally.   Feet:      Comments:   Triphasic hand-held dopplerable pulses of the bilateral dorsalis pedis and posterior tibial arteries.  Skin:     General: Skin is warm.      Capillary Refill: Capillary refill takes less than 2 seconds.      Coloration: Skin is not ashen.      Findings: No bruising, erythema, lesion, rash or wound.   Neurological:      Mental Status: She is alert and oriented to person, place, and time.      Motor: No weakness.   Psychiatric:         Speech: Speech normal.         Behavior: Behavior normal. Behavior is cooperative.       Reticular/Spider veins noted:  RLE: anterior calf, medial calf, posterior calf, and lateral calf  LLE: anterior calf, medial calf, posterior calf, and lateral calf    Varicose veins noted:  RLE: none  LLE:  none    CEAP Classification  Clinical Signs: Class 4 - Skin changes ascribed to venous disease  Etiologic Classification: Primary  Anatomic distribution: Superficial  Pathophysiologic dysfunction: Reflux       Venous Clinical Severity Score  Pain:2=Daily, moderate activity limitation, occasional analgesics  Varicose Veins: 1=Few, scattered. Branch varicose veins  Venous Edema: 2=Afternoon edema, above ankle  Pigmentation: 0=None or focal, low intensity (tan)  Inflammation: 0=None  Induration: 0=None  Number of Active Ulcers: 0=0  Active Ulceration, Duration: 0=None  Active Ulcer Size: 0=None  Compressive Therapy: 3=Full compliance, stockings + elevation  Total Score: 8       III. ASSESSMENT & PLAN (MEDICAL DECISION MAKING)     1. Venous insufficiency (chronic) (peripheral)    2. Edema, lower extremity    3. Leg pain, bilateral          Assessment/Diagnosis and Plan:  The patient is doing well postprocedure and is encouraged to continue wearing knee-high compression with daily leg exercise and leg elevation.  Follow-up in 3 months for a 6-month post ablation  No ultrasound unless she is having trouble.        Chris Hutchison,  DO

## 2025-03-31 ENCOUNTER — OFFICE VISIT (OUTPATIENT)
Dept: FAMILY MEDICINE | Facility: CLINIC | Age: 70
End: 2025-03-31
Payer: MEDICARE

## 2025-03-31 ENCOUNTER — APPOINTMENT (OUTPATIENT)
Dept: RADIOLOGY | Facility: CLINIC | Age: 70
End: 2025-03-31
Attending: FAMILY MEDICINE
Payer: MEDICARE

## 2025-03-31 VITALS
DIASTOLIC BLOOD PRESSURE: 74 MMHG | TEMPERATURE: 98 F | WEIGHT: 198 LBS | SYSTOLIC BLOOD PRESSURE: 118 MMHG | HEART RATE: 80 BPM | BODY MASS INDEX: 31.08 KG/M2 | HEIGHT: 67 IN | OXYGEN SATURATION: 98 %

## 2025-03-31 DIAGNOSIS — K40.90 NON-RECURRENT UNILATERAL INGUINAL HERNIA WITHOUT OBSTRUCTION OR GANGRENE: ICD-10-CM
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
## 2025-03-31 DIAGNOSIS — R07.81 RIB PAIN ON LEFT SIDE: ICD-10-CM

## 2025-03-31 DIAGNOSIS — R30.0 DYSURIA: Primary | ICD-10-CM

## 2025-03-31 DIAGNOSIS — N30.90 CYSTITIS: ICD-10-CM

## 2025-03-31 LAB
BILIRUB SERPL-MCNC: NEGATIVE MG/DL
BLOOD URINE, POC: ABNORMAL
CLARITY, UA: ABNORMAL
COLOR, UA: YELLOW
GLUCOSE UR QL STRIP: NEGATIVE
KETONES UR QL STRIP: NEGATIVE
LEUKOCYTE ESTERASE URINE, POC: ABNORMAL
NITRITE, POC UA: POSITIVE
PH, POC UA: 5.5
PROTEIN, POC: ABNORMAL
SPECIFIC GRAVITY, POC UA: 1.02
UROBILINOGEN, POC UA: 0.2

## 2025-03-31 PROCEDURE — 71100 X-RAY EXAM RIBS UNI 2 VIEWS: CPT | Mod: TC,RHCUB,FY,LT | Performed by: FAMILY MEDICINE

## 2025-03-31 PROCEDURE — 96372 THER/PROPH/DIAG INJ SC/IM: CPT | Mod: ,,, | Performed by: FAMILY MEDICINE

## 2025-03-31 PROCEDURE — 71100 X-RAY EXAM RIBS UNI 2 VIEWS: CPT | Mod: 26,LT,, | Performed by: RADIOLOGY

## 2025-03-31 PROCEDURE — 99213 OFFICE O/P EST LOW 20 MIN: CPT | Mod: ,,, | Performed by: FAMILY MEDICINE

## 2025-03-31 RX ORDER — CIPROFLOXACIN 250 MG/1
250 TABLET, FILM COATED ORAL 2 TIMES DAILY
Qty: 14 TABLET | Refills: 0 | Status: SHIPPED | OUTPATIENT
Start: 2025-03-31

## 2025-03-31 RX ORDER — CEFTRIAXONE 1 G/1
1 INJECTION, POWDER, FOR SOLUTION INTRAMUSCULAR; INTRAVENOUS
Status: COMPLETED | OUTPATIENT
Start: 2025-03-31 | End: 2025-03-31

## 2025-03-31 RX ADMIN — CEFTRIAXONE 1 G: 1 INJECTION, POWDER, FOR SOLUTION INTRAMUSCULAR; INTRAVENOUS at 04:03

## 2025-03-31 NOTE — PROGRESS NOTES
Tim Burgos MD   [facility]  [address] [place] [address]     PATIENT NAME: Victoria Delarosa  : 1955  DATE: 3/31/25  MRN: [id number]      Billing Provider: Tim Burgos MD  Level of Service: CO OFFICE/OUTPT VISIT, EST, LEVL III, 20-29 MIN  Patient PCP Information       Provider PCP Type    Tim Burgos MD General            Reason for Visit / Chief Complaint: Urinary Tract Infection (C/o dysuria, frequency, odor and urine has milky color x 2 weeks), Back Pain (C/o back pain after falling on Thursday. States she is hurting from between her shoulders down to her tailbone. States she has had several falls since January.), Chest Pain (C/o left rib pain after fall on Thursday), Headache (C/o headache x 2 weeks), and Fatigue (C/o feeling weak all over)         History of Present Illness / Problem Focused Workflow     Victoria Delarosa presents to the clinic with Urinary Tract Infection (C/o dysuria, frequency, odor and urine has milky color x 2 weeks), Back Pain (C/o back pain after falling on Thursday. States she is hurting from between her shoulders down to her tailbone. States she has had several falls since January.), Chest Pain (C/o left rib pain after fall on Thursday), Headache (C/o headache x 2 weeks), and Fatigue (C/o feeling weak all over)     HPI    Review of Systems     Review of Systems   Constitutional:  Negative for activity change, appetite change, fatigue and fever.   HENT:  Negative for nasal congestion, ear pain, hearing loss, sinus pressure/congestion and sore throat.    Respiratory:  Negative for cough, chest tightness and shortness of breath.    Cardiovascular:  Negative for chest pain and palpitations.   Gastrointestinal:  Negative for abdominal pain and fecal incontinence.   Genitourinary:  Positive for difficulty urinating, dysuria, flank pain and hematuria. Negative for bladder incontinence.   Musculoskeletal:  Negative for arthralgias.    Integumentary:  Negative for rash.   Neurological:  Negative for dizziness and headaches.        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    BMI 32.0-32.9,adult     Bronchitis     CHF (congestive heart failure)     Cobalamin deficiency     Constipation 05/22/2023    Contusion of left knee 08/12/2022    Contusion of right knee 08/12/2022    Deep vein thrombosis     Depressive disorder     Diarrhea 12/07/2022    Fall 08/12/2022    General anesthetics causing adverse effect in therapeutic use     GERD (gastroesophageal reflux disease)     Head injury 08/12/2022    Heart attack     20 yrs ago    Hypertension     Hypothyroidism     Lower extremity edema     Neuropathy     Osteoarthritis of both knees     Pain in left leg     Pain in right leg     Peripheral edema     Pulmonary embolism 2017    Stroke     Varicose veins of bilateral lower extremities with pain     Vitamin D deficiency        Past Surgical History:   Procedure Laterality Date    ABLATION, CHEMICAL SEALANT, VARICOSE VEIN Left 8/30/2024    Procedure: Left GSV VenaSeal Ablation;  Surgeon: Chris Hutchison DO;  Location: [facility];  Service: Peripheral Vascular;  Laterality: Left;    ABLATION, CHEMICAL SEALANT, VARICOSE VEIN Left 9/6/2024    Procedure: Left SSV VenaSeal Ablation;  Surgeon: Chris Hutchison DO;  Location: [facility];  Service: Peripheral Vascular;  Laterality: Left;    BILATERAL TUBAL LIGATION      CARDIAC CATHETERIZATION      COLONOSCOPY      EYE SURGERY      knee scope Left     SCLEROTHERAPY WITH ULTRASOUND GUIDANCE Right 08/29/2024    Right Distal GSV Varithena Ablation performed by Dr. Misael Hutchison    THYROIDECTOMY, PARTIAL      TONSILLECTOMY, ADENOIDECTOMY      TOTAL KNEE ARTHROPLASTY Bilateral     TOTAL KNEE ARTHROPLASTY Right     TUBAL LIGATION       VAGINAL DELIVERY      x 3       Social History  Victoria Delarosa  reports that she has never smoked. She has been exposed to tobacco smoke. She has never used smokeless tobacco. She reports that she does not drink alcohol and does not use drugs.    Family History  Victoria Delarosa  family history includes Arthritis in her brother and sister; Drug abuse in her son; Heart disease in her brother, mother, and sister; Hypertension in her mother; Liver disease in her father; No Known Problems in her brother, daughter, and son; Stroke in her brother.    Medications and Allergies     Medications  Outpatient Medications Marked as Taking for the 3/31/25 encounter (Office Visit) with Tim Burgos MD   Medication Sig Dispense Refill    cyanocobalamin 1,000 mcg/mL injection INJECT 1 ML EVERY MONTH 1 mL 11    ezetimibe (ZETIA) 10 mg tablet Take 1 tablet (10 mg total) by mouth once daily. 90 tablet 1    furosemide (LASIX) 20 MG tablet Take 1 tablet (20 mg total) by mouth once daily. 90 tablet 1    levothyroxine (SYNTHROID) 25 MCG tablet Take 1 tablet (25 mcg total) by mouth once daily. 90 tablet 1    magnesium oxide (MAG-OX) 400 mg (241.3 mg magnesium) tablet Take 1 tablet (400 mg total) by mouth once daily. 90 tablet 1    nitrofurantoin, macrocrystal-monohydrate, (MACROBID) 100 MG capsule Take 1 capsule (100 mg total) by mouth once daily. 30 capsule 5    oxybutynin (DITROPAN) 5 MG Tab Take 1 tablet (5 mg total) by mouth 2 (two) times daily. 180 tablet 1    potassium chloride (KLOR-CON) 10 MEQ TbSR Take 1 tablet (10 mEq total) by mouth once daily. 90 tablet 1    ranolazine (RANEXA) 500 MG Tb12 Take 1 tablet (500 mg total) by mouth 2 (two) times daily. 180 tablet 1    sertraline (ZOLOFT) 100 MG tablet Take 1 tablet (100 mg total) by mouth once daily. 90 tablet 1    spironolactone (ALDACTONE) 25 MG tablet Take 1 tablet (25 mg total) by mouth once daily. 90 tablet 1    warfarin (COUMADIN) 5 MG tablet Take 1 tablet (5 mg total) by  mouth Daily. 90 tablet 1     Current Facility-Administered Medications for the 3/31/25 encounter (Office Visit) with Tim Burgos MD   Medication Dose Route Frequency Provider Last Rate Last Admin    cefTRIAXone injection 1 g  1 g Intramuscular 1 time in Clinic/HOD Tim Burgos MD           Allergies  Review of patient's allergies indicates:   Allergen Reactions    Bactrim [sulfamethoxazole-trimethoprim]      Patient states this medication made her tongue feel like it was on fire and dropped her INR.     Lisinopril      Cough      Statins-hmg-coa reductase inhibitors Hives and Itching       Physical Examination     Vitals:    03/31/25 1537   BP: 118/74   Pulse: 80   Temp: 98.2 °F (36.8 °C)     Physical Exam  Constitutional:       Appearance: Normal appearance.   HENT:      Head: Normocephalic and atraumatic.      Right Ear: Tympanic membrane normal.      Left Ear: Tympanic membrane normal.      Nose: Congestion and rhinorrhea present.      Mouth/Throat:      Pharynx: Posterior oropharyngeal erythema present.   Eyes:      Pupils: Pupils are equal, round, and reactive to light.   Cardiovascular:      Rate and Rhythm: Normal rate and regular rhythm.      Pulses: Normal pulses.      Heart sounds: Normal heart sounds.   Pulmonary:      Breath sounds: No wheezing or rhonchi.   Abdominal:      Palpations: Abdomen is soft.      Hernia: A hernia (left inguinal area) is present.   Lymphadenopathy:      Cervical: Cervical adenopathy present.   Skin:     General: Skin is warm and dry.   Neurological:      Mental Status: She is alert.          Assessment and Plan (including Health Maintenance)   :    Plan:         Health Maintenance Due   Topic Date Due    TETANUS VACCINE  Never done    RSV Vaccine (Age 60+ and Pregnant patients) (1 - Risk 60-74 years 1-dose series) Never done    Shingles Vaccine (2 of 2) 03/06/2024    Pneumococcal Vaccines (Age 50+) (3 of 3 - PCV20 or PCV21) 06/04/2024    Influenza Vaccine (1)  09/01/2024    COVID-19 Vaccine (4 - 2024-25 season) 09/01/2024    Mammogram  06/04/2025       Problem List Items Addressed This Visit    None  Visit Diagnoses         Dysuria    -  Primary    Relevant Orders    POCT URINALYSIS W/O SCOPE (Completed)      Rib pain on left side        Relevant Orders    X-Ray Ribs 2 View Left (Completed)      Fall, initial encounter        Relevant Orders    X-Ray Ribs 2 View Left (Completed)      Non-recurrent unilateral inguinal hernia without obstruction or gangrene        Relevant Orders    Ambulatory referral/consult to General Surgery      Cystitis        Relevant Medications    cefTRIAXone injection 1 g (Start on 3/31/2025  4:15 PM)          Dysuria  -     POCT URINALYSIS W/O SCOPE    Rib pain on left side  -     X-Ray Ribs 2 View Left; Future; Expected date: 03/31/2025    Fall, initial encounter  -     X-Ray Ribs 2 View Left; Future; Expected date: 03/31/2025    Non-recurrent unilateral inguinal hernia without obstruction or gangrene  -     Ambulatory referral/consult to General Surgery; Future; Expected date: 04/07/2025    Cystitis  -     cefTRIAXone injection 1 g    Other orders  -     ciprofloxacin HCl (CIPRO) 250 MG tablet; Take 1 tablet (250 mg total) by mouth 2 (two) times daily.  Dispense: 14 tablet; Refill: 0  -     lactulose (CHRONULAC) 20 gram/30 mL Soln; Take 15 mLs (10 g total) by mouth 2 (two) times daily. for 14 days  Dispense: 420 mL; Refill: 0       Health Maintenance Topics with due status: Not Due       Topic Last Completion Date    Colorectal Cancer Screening 11/28/2022    DEXA Scan 08/10/2023    Lipid Panel 10/21/2024       Procedures     Future Appointments   Date Time Provider Department Center   4/7/2025 10:15 AM Chris Hutchison DO [facility]   7/22/2025  8:20 AM [facility]   8/19/2025  8:30 AM Philly Gooden, JAMES [facility] [place]        No follow-ups on file.       Signature:  Tim Burgos,  MD [facility]  [address] [facility]   [place] [id number]  [phone] Phone  [phone] Fax    Date of encounter: 3/31/25

## 2025-04-07 ENCOUNTER — OFFICE VISIT (OUTPATIENT)
Dept: VASCULAR SURGERY | Facility: CLINIC | Age: 70
End: 2025-04-07
Payer: MEDICARE

## 2025-04-07 VITALS
RESPIRATION RATE: 18 BRPM | HEIGHT: 67 IN | DIASTOLIC BLOOD PRESSURE: 71 MMHG | SYSTOLIC BLOOD PRESSURE: 110 MMHG | BODY MASS INDEX: 31.18 KG/M2 | WEIGHT: 198.63 LBS | HEART RATE: 94 BPM

## 2025-04-07 DIAGNOSIS — R60.0 EDEMA, LOWER EXTREMITY: ICD-10-CM

## 2025-04-07 DIAGNOSIS — I87.2 VENOUS INSUFFICIENCY (CHRONIC) (PERIPHERAL): Primary | ICD-10-CM

## 2025-04-07 DIAGNOSIS — M79.604 LEG PAIN, BILATERAL: ICD-10-CM

## 2025-04-07 DIAGNOSIS — M79.605 LEG PAIN, BILATERAL: ICD-10-CM

## 2025-04-07 PROCEDURE — 99215 OFFICE O/P EST HI 40 MIN: CPT | Mod: PBBFAC | Performed by: FAMILY MEDICINE

## 2025-04-07 PROCEDURE — 99214 OFFICE O/P EST MOD 30 MIN: CPT | Mod: S$PBB,,, | Performed by: FAMILY MEDICINE

## 2025-04-07 PROCEDURE — 99999 PR PBB SHADOW E&M-EST. PATIENT-LVL V: CPT | Mod: PBBFAC,,, | Performed by: FAMILY MEDICINE

## 2025-04-07 NOTE — PROGRESS NOTES
VEIN CENTER CLINIC NOTE    Patient ID: Victoria Delarosa is a 69 y.o. female.    I. HISTORY     Chief Complaint:   Chief Complaint   Patient presents with    Follow-up     7M PA         HPI: Victoria Delarosa is a 69 y.o. female who presents today for 7-month status post left small saphenous vein and left great saphenous vein non thermal adhesive ablation as well as a right distal great saphenous vein non thermal ablation.  The patient states she has done well since her procedures and noted less swelling, heaviness and pain of the bilateral lower extremities.  However, she has been having a lot of trouble with her lower extremity strength and balance.  She has even had a couple of falls lately.  Patient states she would be agreeable for PT/OT evaluation from home health.    Clinical summary:  The patient was initially seen on 06/04/2024 by self referral for evaluation of  bilateral lower extremity edema and pain.  Symptoms are  persistent/worsening and began  greater than 5 years ago.  Location is bilateral  lower extremities below the knees. Symptoms are  worse at the end of the day.  History of venous interventions includes  vein procedures performed bilaterally by a physician in [facility] several years ago.    Denies family history of venous disease.   Patient also admits to history of left popliteal DVT and bilateral Pes about 4 years ago for which she was placed on warfarin and remains on it today.  Last INR was 1.8, she checks this at home.  The patient states she is worn compression stockings over the past 4 years, ever since her DVT and previous diagnosis of venous insufficiency.  She also walks regularly for exercise and elevate her legs when appropriate.  She feels despite these conservative measures her symptoms have worsened over the past several years and she would like to have underlying condition corrected if possible.    Bilateral complete venous reflux study 07/09/2024, shows no acute DVT bilaterally.   There post thrombotic changes within the left popliteal vein as well as deep venous reflux.  The study also shows a well ablated proximal right great and right small saphenous veins.  Reflux is noted within the distal right great saphenous vein, left great saphenous vein and left small saphenous vein.    Venous Disease Medical Necessity Documentation Initial Visit Date:  06/04/2024 Return Check Date:    Have you ever had a rupture or bleed from a varicose vein in your leg(s)?              [] Yes  [x] No   [] Yes   [] No   Have you ever been diagnosed with phlebitis, cellulitis, or inflammation in the area of the varicose veins of  your leg(s)? PE, DVT  [x] Yes  [] No    [] Yes   [] No   Do you have darkened or inflamed skin on your legs?   [x] Yes   [] No   [] Yes   [] No   Do you have leg swelling?     [x] Yes   [] No   [] Yes   [] No   Do you have leg pain?   [x] Yes   [] No   [] Yes   [] No   If yes, describe the type of pain?    [x]   Stabbing [x]  Radiating [x]  Aching   [x]  Tightness [x]  Throbbing               [x]  Burning [x]  Cramping              Do you have leg discomfort?   [x] Yes   [] No   [] Yes   [] No   If yes, describe the type of discomfort?    [x]  Heaviness []  Fullness   [x]  Restlessness [x] Tired/Fatigued [] Itching              Have you ever worn compression hose?   [x] Yes   [] No   [] Yes   [] No   If yes, how long?    1 1/2 TO 2 YEARS       Do you elevate your legs while sitting?   [x] Yes   [] No   [] Yes   [] No   Does venous disease (varicose veins, ulcers, skin changes, leg pain/swelling) interfere with your daily life?  If yes, check activities you are limited or unable to do.    []  Shower  [x]   Walk  [x]  Exercise  [x] Play with children/grandchildren  [x]  Shop [] Work [x] Stand for any period of time [x] Sleep                               [x] Sitting for an extended period of time.           [x] Yes   [] No   [] Yes   [] No   Do you exercise/have you tried to exercise (i.e.   Walk our participate in a regular exercise routine)?  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    BMI 32.0-32.9,adult     Bronchitis     CHF (congestive heart failure)     Cobalamin deficiency     Constipation 05/22/2023    Contusion of left knee 08/12/2022    Contusion of right knee 08/12/2022    Deep vein thrombosis     Depressive disorder     Diarrhea 12/07/2022    Fall 08/12/2022    General anesthetics causing adverse effect in therapeutic use     GERD (gastroesophageal reflux disease)     Head injury 08/12/2022    Heart attack     20 yrs ago    Hypertension     Hypothyroidism     Lower extremity edema     Neuropathy     Osteoarthritis of both knees     Pain in left leg     Pain in right leg     Peripheral edema     Pulmonary embolism 2017    Stroke     Varicose veins of bilateral lower extremities with pain     Vitamin D deficiency         Past Surgical History:   Procedure Laterality Date    ABLATION, CHEMICAL SEALANT, VARICOSE VEIN Left 8/30/2024    Procedure: Left GSV VenaSeal Ablation;  Surgeon: Chris Hutchison DO;  Location: [facility];  Service: Peripheral Vascular;  Laterality: Left;    ABLATION, CHEMICAL SEALANT, VARICOSE VEIN Left 9/6/2024    Procedure: Left SSV VenaSeal Ablation;  Surgeon: Chris Hutchison DO;  Location: [facility];  Service: Peripheral Vascular;  Laterality: Left;    BILATERAL TUBAL LIGATION      CARDIAC CATHETERIZATION      COLONOSCOPY      EYE SURGERY      knee scope Left     SCLEROTHERAPY WITH ULTRASOUND GUIDANCE Right 08/29/2024    Right Distal GSV Varithena Ablation performed by Dr. Misael Hutchison    THYROIDECTOMY, PARTIAL      TONSILLECTOMY, ADENOIDECTOMY      TOTAL KNEE ARTHROPLASTY Bilateral     TOTAL KNEE ARTHROPLASTY Right     TUBAL LIGATION      VAGINAL DELIVERY      x 3        Social History     Tobacco Use   Smoking Status Never    Passive exposure: Past (brothers and sisters.)   Smokeless Tobacco Never         Current Outpatient Medications:     ciprofloxacin HCl (CIPRO) 250 MG tablet, Take 1 tablet (250 mg total) by mouth 2 (two) times daily., Disp: 14 tablet, Rfl: 0    cyanocobalamin 1,000 mcg/mL injection, INJECT 1 ML EVERY MONTH, Disp: 1 mL, Rfl: 11    ezetimibe (ZETIA) 10 mg tablet, Take 1 tablet (10 mg total) by mouth once daily., Disp: 90 tablet, Rfl: 1    furosemide (LASIX) 20 MG tablet, Take 1 tablet (20 mg total) by mouth once daily., Disp: 90 tablet, Rfl: 1    lactulose (CHRONULAC) 20 gram/30 mL Soln, Take 15 mLs (10 g total) by mouth 2 (two) times daily. for 14 days, Disp: 420 mL, Rfl: 0    levothyroxine (SYNTHROID) 25 MCG tablet, Take 1 tablet (25 mcg total) by mouth once daily., Disp: 90 tablet, Rfl: 1    magnesium oxide (MAG-OX) 400 mg (241.3 mg magnesium) tablet, Take 1 tablet (400 mg total) by mouth once daily., Disp: 90 tablet, Rfl: 1    nitrofurantoin, macrocrystal-monohydrate, (MACROBID) 100 MG capsule, Take 1 capsule (100 mg total) by mouth once daily., Disp: 30 capsule, Rfl: 5    oxybutynin (DITROPAN) 5 MG Tab, Take 1 tablet (5 mg total) by mouth 2 (two) times daily., Disp: 180 tablet, Rfl: 1    potassium chloride (KLOR-CON) 10 MEQ TbSR, Take 1 tablet (10 mEq total) by mouth once daily., Disp: 90 tablet, Rfl: 1    ranolazine (RANEXA) 500 MG Tb12, Take 1 tablet (500 mg total) by mouth 2 (two) times daily., Disp: 180 tablet, Rfl: 1    sertraline (ZOLOFT) 100 MG tablet, Take 1 tablet (100 mg total) by mouth once daily., Disp: 90 tablet, Rfl: 1    spironolactone (ALDACTONE) 25 MG tablet, Take 1 tablet (25 mg total) by mouth once daily., Disp: 90 tablet, Rfl: 1    warfarin (COUMADIN) 5 MG tablet, Take 1 tablet (5 mg total) by mouth Daily., Disp: 90 tablet, Rfl: 1    cetirizine (ZYRTEC) 10 MG tablet, Take 10 mg by mouth once daily., Disp: , Rfl:     coal tar  (NEUTROGENA T-GEL) 0.5 % shampoo, Apply topically nightly as needed., Disp: 240 mL, Rfl: 12    gabapentin (NEURONTIN) 100 MG capsule, Take 100 mg by mouth every evening., Disp: , Rfl:     losartan (COZAAR) 25 MG tablet, Take 1 tablet (25 mg total) by mouth once daily., Disp: 90 tablet, Rfl: 1    Review of Systems   Constitutional:  Negative for activity change, chills, diaphoresis, fatigue and fever.   Respiratory:  Negative for cough and shortness of breath.    Cardiovascular:  Positive for leg swelling. Negative for chest pain and claudication.        Hyperpigmentation LE   Gastrointestinal:  Negative for nausea and vomiting.   Musculoskeletal:  Positive for leg pain. Negative for joint swelling.   Integumentary:  Negative for rash and wound.   Neurological:  Negative for weakness and numbness.        II. PHYSICAL EXAM     Physical Exam  Constitutional:       General: She is awake. She is not in acute distress.     Appearance: Normal appearance. She is obese. She is not ill-appearing or toxic-appearing.   HENT:      Head: Normocephalic and atraumatic.   Eyes:      Extraocular Movements: Extraocular movements intact.      Conjunctiva/sclera: Conjunctivae normal.      Pupils: Pupils are equal, round, and reactive to light.   Neck:      Vascular: No carotid bruit or JVD.   Cardiovascular:      Rate and Rhythm: Normal rate and regular rhythm.      Pulses:           Dorsalis pedis pulses are detected w/ Doppler on the right side and detected w/ Doppler on the left side.        Posterior tibial pulses are detected w/ Doppler on the right side and detected w/ Doppler on the left side.      Heart sounds: No murmur heard.  Pulmonary:      Effort: Pulmonary effort is normal. No respiratory distress.      Breath sounds: No stridor. No wheezing, rhonchi or rales.   Musculoskeletal:         General: No swelling, tenderness or deformity.      Right lower le+ Edema present.      Left lower le+ Edema present.       Comments:  No evidence of open ulceration, weeping or cellulitis bilaterally.   Feet:      Comments:   Triphasic hand-held dopplerable pulses of the bilateral dorsalis pedis and posterior tibial arteries.  Skin:     General: Skin is warm.      Capillary Refill: Capillary refill takes less than 2 seconds.      Coloration: Skin is not ashen.      Findings: No bruising, erythema, lesion, rash or wound.   Neurological:      Mental Status: She is alert and oriented to person, place, and time.      Motor: No weakness.   Psychiatric:         Speech: Speech normal.         Behavior: Behavior normal. Behavior is cooperative.       Reticular/Spider veins noted:  RLE: anterior calf, medial calf, posterior calf, and lateral calf  LLE: anterior calf, medial calf, posterior calf, and lateral calf    Varicose veins noted:  RLE: none  LLE:  none    CEAP Classification  Clinical Signs: Class 4 - Skin changes ascribed to venous disease  Etiologic Classification: Primary  Anatomic distribution: Superficial  Pathophysiologic dysfunction: Reflux       Venous Clinical Severity Score  Pain:2=Daily, moderate activity limitation, occasional analgesics  Varicose Veins: 1=Few, scattered. Branch varicose veins  Venous Edema: 2=Afternoon edema, above ankle  Pigmentation: 0=None or focal, low intensity (tan)  Inflammation: 0=None  Induration: 0=None  Number of Active Ulcers: 0=0  Active Ulceration, Duration: 0=None  Active Ulcer Size: 0=None  Compressive Therapy: 3=Full compliance, stockings + elevation  Total Score: 8       III. ASSESSMENT & PLAN (MEDICAL DECISION MAKING)     1. Venous insufficiency (chronic) (peripheral)    2. Edema, lower extremity    3. Leg pain, bilateral        Assessment/Diagnosis and Plan:  The patient is doing well postprocedure and is encouraged to continue wearing knee-high compression with daily leg exercise and leg elevation.  Follow-up in 5 months for a 12-month post ablation  No ultrasound unless she is having  trouble.    I will also write home health orders for physical therapy.        Chris Hutchison DO        Date:2025  Patient Name:Victoria Delarosa  :1955  Allergies:Bactrim [sulfamethoxazole-trimethoprim], Lisinopril, and Statins-hmg-coa reductase inhibitors    Home Health Agency: Other    Type of Orders: PT Orders    Diagnosis: Physical Therapy    Frequency: 2 x weekly    Days:  Twice weekly    Duration: 0 Wk(s) 3 Mo(s)     ------------------------------------------------------------------------------------------------------------------------------------------------------------------------------------------------------------------------------------------------------------------    PT Goals:  Eval and treat to Improve calf pump function, increase ankle ROM, improve LE strength, balance, and gait.     Wound Care:     [] Remove existing wraps/ dressing and inspect for signs of infection, wash leg and wound with mild soap (i.e. baby shampoo).     [] SAIRA: Inspect SAIRA negative pressure dressing and observe for signs of infections.   If saturated: remove dressing, wash wound with mild soap (baby shampoo), spray/wipe shiv-wound area with skin prep and replace with new dressing.   If NOT saturated: leave in place     [] Patient has a skin substitute graft in place and will not require washing.     [] TRADITIONAL WOUND VAC: Remove standard negative pressure dressing and note any signs of infection, wash wound and leg with mild soap (baby shampoo).   Spray/wipe shiv-wound area with skin prep, apply Eakins ring to shiv-wound area, re-apply standard negative pressure dressing.     [] Re-apply dressing consisting of: TO ULCERATED AREAS      [] Collagen with silver  [] Plain Collagen [] Alginate bolster  [] Foam Pad     [] Aqua Seal  [] Silvadene Topical  [] Santyl   [] Island Dressing     []Other:     [] Apply Compression Wrap: []  Left [] Right [] Bilateral     [] 3M Coban 2 Lite (20-30 mmHg) (Green)   [] 3M Coban 2  (30-40 mmHg) (Purple)     []Pink UNNA Boot (with one choice above)   [] White UNNA boot (with one choice above)     [x] Compression Stockings  []Other:     [] Skincare:     [] Calmoseptine  [] Eucerin/Aquaphor Cream  [] Triamcinolone Cream          Misc:       Chris Hutchison      Any questions regarding orders, please call [phone]     [facility] ? [address] ? [place] [address] ? [phone] ? Fax: [phone]

## 2025-04-10 ENCOUNTER — TELEPHONE (OUTPATIENT)
Dept: VASCULAR SURGERY | Facility: CLINIC | Age: 70
End: 2025-04-10
Payer: MEDICARE

## 2025-04-16 ENCOUNTER — OFFICE VISIT (OUTPATIENT)
Dept: SURGERY | Facility: CLINIC | Age: 70
End: 2025-04-16
Payer: MEDICARE

## 2025-04-16 VITALS — HEIGHT: 67 IN | BODY MASS INDEX: 30.45 KG/M2 | WEIGHT: 194 LBS

## 2025-04-16 DIAGNOSIS — K40.90 NON-RECURRENT UNILATERAL INGUINAL HERNIA WITHOUT OBSTRUCTION OR GANGRENE: ICD-10-CM

## 2025-04-16 DIAGNOSIS — Z01.818 PRE-PROCEDURAL EXAMINATION: Primary | ICD-10-CM

## 2025-04-16 PROCEDURE — 99999 PR PBB SHADOW E&M-EST. PATIENT-LVL V: CPT | Mod: PBBFAC,,, | Performed by: STUDENT IN AN ORGANIZED HEALTH CARE EDUCATION/TRAINING PROGRAM

## 2025-04-16 PROCEDURE — 99204 OFFICE O/P NEW MOD 45 MIN: CPT | Mod: S$PBB,,, | Performed by: STUDENT IN AN ORGANIZED HEALTH CARE EDUCATION/TRAINING PROGRAM

## 2025-04-16 PROCEDURE — 99215 OFFICE O/P EST HI 40 MIN: CPT | Mod: PBBFAC | Performed by: STUDENT IN AN ORGANIZED HEALTH CARE EDUCATION/TRAINING PROGRAM

## 2025-04-16 RX ORDER — CEFAZOLIN SODIUM 2 G/50ML
2 SOLUTION INTRAVENOUS
OUTPATIENT
Start: 2025-04-16

## 2025-04-16 RX ORDER — SODIUM CHLORIDE 9 MG/ML
INJECTION, SOLUTION INTRAVENOUS CONTINUOUS
OUTPATIENT
Start: 2025-04-16

## 2025-04-16 NOTE — PATIENT INSTRUCTIONS
[facility]  Instructions for surgery        Your surgery is scheduled for 5/16/25 at [facility] on the 1st floor of the [facility] building.Your arrival time is 0530 a.m   You will be notified the day before  surgery verifying your arrival time for surgery.    Your preop lab work will be done 5/12/25. Lab is on the 1st floor of the clinic. EKG is on 2nd floor of the clinic.                                                                                                                                                                                                                                                                                                                                                                           Day of Surgery Instructions      Bring a list of all your medications with you the day of your surgery. You can also give the list to your doctor or nurse during your final clinic appointment before surgery.      Do not eat any solid foods or drink any liquids after 12:00 AM (midnight). This includes gum, hard candy, mints, and chewing tobacco.  Medications: Take any medications specified with a small sip of water the morning of your surgery.  Brush your teeth: You may brush your teeth and rinse your mouth. Do not swallow any water or toothpaste.  Clothing: A button front shirt and loose-fitting clothes are the most comfortable before and after surgery. We also recommend low-heeled shoes.  Hair: Avoid buns, ponytails, or hairpieces at the back of the head. Remove or avoid any clips, pins or bands that bind hair. Do not use hairspray. Before going to surgery, you will need to remove any wigs or hairpieces.  We will cover your hair during surgery. Your privacy regarding personal appearance will be respected.  Fingernails: Please be sure to remove all nail polish before you arrive for surgery. We understand that tips, wraps, gels, etc., are  expensive; however, we ask these products to be removed from at least one finger on each hand. Your fingertips are used to accurately monitor your oxygen level during surgery by a device called an oximeter.  Glasses and Contact Lenses: Wear glasses when possible. If contact lenses must be worn, bring a lens case and solution. If glasses are worn, bring a case for them.  Hearing Aids: If you rely on a hearing aid, wear it to the hospital on the day of surgery. This will ensure you can hear and understand everything we need to communicate with you.  Valuables: Jewelry, including body piercings, Dentures, money, and credit cards should be left at home. [facility] is not responsible for valuables that are not secured in our surgery center.  Makeup, Perfume, Creams, Lotions and Deodorants: Do not use any of these products on the day of surgery. Remove false eyelashes prior to surgery.  Implanted Medical Devices: If you have an implanted device, such as a pacemaker or AICD, bring the device information card (if you have it) with you.  Medical Equipment: If you have been fitted for a brace to wear after surgery or you have been given crutches, bring those with you to the surgery center.  Ankle Monitor: Ankle monitors MUST be removed prior to surgery.  Shower: Take a shower with Hibiclens® (chlorhexidine) (available over the counter). This reduces the chance of infection. PLEASE USE CHLORHEXIDINE WASH THE NIGHT BEFORE SURGERY AND THE MORNING OF SURGERY.  ONLY 2 VISITORS ARE ALLOWED WITH YOU ON DAY OF SURGERY.        Medication instructions:  You may take blood pressure medication with a small drink of water the morning of surgery.    Stop your blood thinner 5 days before surgery. Do not take blood thinner after 5/10/25.    IF YOU ARE ON ANY OF THESE BLOOD THINNERS, MAKE SURE YOUR PHYSICIAN IS AWARE.  Eliquis/Apixaban            Wafarin/Coumadin,Jantoven  Xarelto/Rivaroxaban      Pletal/Cilostazol  Plavix/Clopidogrel           Pradaxa/Dibigatran      If you are diabetic      Follow the diabetic medicine instructions you received during your pre-operative visit.  DO NOT take your insulin or diabetic medications the morning of surgery.  When you arrive at the surgical center, be sure to tell the nurse you are diabetic.    The following blood sugar medications have to be stopped prior to surgery:    Hold 24 hours prior to surgery:    Libraglutide - Saxenda, Victoza  Lixisenatide --Adlxyin  Exenatide  --  Byetta  Empaglifozin--Jardiance  Sitaglitin--Januvia    Hold 1 week prior to surgery:    Semaglutide - Ozempic, Wegouy, Rybelsus  Dulaglutide - Trulicity  Tirzepatide - Mounjaro  Exenatide (extended release inj)-- Bydureon BCise      Hold 48 hours prior to surgery:    Metformin, Glucovance, Metaglip, Fortamet, Glucophage, Riomet, Avandamet, Glimepiride            Other Items to bring with you and know    Insurance card  Identification card such as 's license, passport, or other picture ID  Copy of your advance directives  List of medications and allergies, if not already provided  Name and phone number of person to contact if your condition changes significantly. YOU CANNOT DRIVE YOURSELF HOME FROM THE HOSPITAL THE DAY OF SURGERY.  PLEASE UNDERSTAND THAT OUR OFFICE DOES NOT GIVE PATHOLOGY RESULTS OR TEST RESULTS OVER THE PHONE. THIS WILL BE DISCUSSED WITH YOU ON YOUR FOLLOW UP APPOINTMENT.  IF YOU SUBMIT FMLA FORMS, IT WILL TAKE 3-7 DAYS TO COMPLETE THESE          Alcohol and Surgery  We want to help you prepare for and recover from surgery as quickly and safely as possible. Be open and honest with your provider about how many drinks you have per day. Excessive alcohol use is defined as drinking more than three drinks per day. It can affect the outcome of your surgery. Binge drinking (consuming large amounts of alcohol infrequently, such as on weekends) can also affect the outcome of your surgery.  Alcohol withdrawal  If you drink more  than three drinks a day, you could have a complication, called alcohol withdrawal, after surgery.  Alcohol withdrawal is a set of symptoms that people have when they suddenly stop drinking after using alcohol  for a long time. During withdrawal, a person's central nervous system overreacts. This can cause mild symptoms such as shakiness, sweating or hallucinating. It can also cause other more serious side effects. If not treated properly, alcohol withdrawal can cause potentially life-threatening complications after surgery. This can include tremors, seizures, hallucinations, delirium tremors, and even death. Untreated alcohol withdrawal often leads to a longer stay in the hospital, potentially in the Intensive Care Unit.  Chronic heavy drinking also can interfere with several organ systems and biochemical processes in the body.  This interference can cause serious, even life-threatening complications.  Your care team can offer alcohol withdrawal treatment to help:  Decrease the risk of seizures and delirium tremors after surgery  Decrease the risk we will need to restrain you for your own safety or the safety of others  Decrease your risk of falling after surgery  Reduce the use of potent sedative medications  Reduce the time you stay in the hospital after surgery  Reduce the time you might spend on a mechanical ventilator to help you breathe  Lower incidence of organ failure and biochemical complications  Talk to a member of your care team or your primary care physician about your alcohol use if you feel you may be at risk of any of these complications.        Smoking and Surgery  Quitting smoking is extremely important for a successful surgery and recovery. Cigarette smoking compromises your immune system. This increases your risk of an infection after surgery. Quitting the habit before surgery will decrease the surgical risks associated with smoking.

## 2025-04-16 NOTE — H&P (VIEW-ONLY)
History & Physical    Subjective     History of Present Illness:  69-year-old  female presents the clinic for evaluation for left groin bulge and chronic constipation.  Patient states he has noticed some pain in her left groin.  Patient has been having issues with constipation for several years; currently on lactulose, but does not taking regularly.  History of pulmonary embolism in 2017; on Coumadin.  Patient also follows up with Cardiology and mobile with Dr. Carlos Reed; states she saw him last month and had a stress test which was unremarkable.  Continued to complain of soreness in the left groin and sent to General surgery for evaluation.  Denies any chest pain/shortness of breath, nausea/vomiting/diarrhea, fever/chills.  Past surgical history of tubal ligation; no other abdominal surgeries; has had multiple venous ablations to the lower extremities, cardiac catheterization, bilateral knee replacements, tonsillectomy, adenoidectomy, partial thyroidectomy.    Chief Complaint   Patient presents with    Pre-op Exam     Pt here for possible hernia        Review of patient's allergies indicates:   Allergen Reactions    Bactrim [sulfamethoxazole-trimethoprim]      Patient states this medication made her tongue feel like it was on fire and dropped her INR.     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    BMI 32.0-32.9,adult     Bronchitis     CHF (congestive heart failure)     Cobalamin deficiency     Constipation 05/22/2023    Contusion of left knee 08/12/2022    Contusion of right knee 08/12/2022    Deep vein thrombosis     Depressive disorder     Diarrhea 12/07/2022    Fall 08/12/2022    General anesthetics causing adverse effect in therapeutic use     GERD  (gastroesophageal reflux disease)     Head injury 08/12/2022    Heart attack     20 yrs ago    Hypertension     Hypothyroidism     Lower extremity edema     Neuropathy     Osteoarthritis of both knees     Pain in left leg     Pain in right leg     Peripheral edema     Pulmonary embolism 2017    Stroke     Varicose veins of bilateral lower extremities with pain     Vitamin D deficiency      Past Surgical History:   Procedure Laterality Date    ABLATION, CHEMICAL SEALANT, VARICOSE VEIN Left 8/30/2024    Procedure: Left GSV VenaSeal Ablation;  Surgeon: Chris Hutchison DO;  Location: [facility];  Service: Peripheral Vascular;  Laterality: Left;    ABLATION, CHEMICAL SEALANT, VARICOSE VEIN Left 9/6/2024    Procedure: Left SSV VenaSeal Ablation;  Surgeon: Chris Hutchison DO;  Location: [facility];  Service: Peripheral Vascular;  Laterality: Left;    BILATERAL TUBAL LIGATION      CARDIAC CATHETERIZATION      COLONOSCOPY      EYE SURGERY      knee scope Left     SCLEROTHERAPY WITH ULTRASOUND GUIDANCE Right 08/29/2024    Right Distal GSV Varithena Ablation performed by Dr. Misael Hutchison    THYROIDECTOMY, PARTIAL      TONSILLECTOMY, ADENOIDECTOMY      TOTAL KNEE ARTHROPLASTY Bilateral     TOTAL KNEE ARTHROPLASTY Right     TUBAL LIGATION      VAGINAL DELIVERY      x 3     Family History   Problem Relation Name Age of Onset    Hypertension Mother      Heart disease Mother      Liver disease Father      Arthritis Sister      Heart disease Sister      Arthritis Brother      Heart disease Brother      Stroke Brother      No Known Problems Brother      No Known Problems Daughter      Drug abuse Son      No Known Problems Son       Social History[2]     Review of Systems:  Review of Systems   Constitutional: Negative.  Negative for fatigue and unexpected weight change.   HENT: Negative.  Negative for trouble swallowing.    Eyes: Negative.    Respiratory: Negative.  Negative for chest tightness and shortness of  "breath.    Cardiovascular: Negative.  Negative for chest pain.   Gastrointestinal:  Positive for constipation. Negative for abdominal pain, blood in stool and nausea.   Endocrine: Negative.    Genitourinary: Negative.  Negative for hematuria.   Musculoskeletal: Negative.  Negative for back pain and myalgias.   Neurological: Negative.  Negative for dizziness, speech difficulty, weakness and light-headedness.   Psychiatric/Behavioral: Negative.  Negative for agitation and behavioral problems.           Objective     Vital Signs (Most Recent)     5' 7" (1.702 m)  88 kg (194 lb)     Physical Exam:  Physical Exam  Constitutional:       General: She is not in acute distress.     Appearance: Normal appearance.   HENT:      Head: Normocephalic.   Cardiovascular:      Rate and Rhythm: Normal rate.   Pulmonary:      Effort: Pulmonary effort is normal. No respiratory distress.   Abdominal:      General: There is no distension.      Tenderness: There is no abdominal tenderness.      Hernia: A hernia is present. Hernia is present in the umbilical area and left inguinal area.          Comments: Reducible left inguinal hernia, small 2 cm umbilical hernia; positive tenderness to palpation   Musculoskeletal:         General: Normal range of motion.   Skin:     General: Skin is warm.      Coloration: Skin is not jaundiced.   Neurological:      General: No focal deficit present.      Mental Status: She is alert and oriented to person, place, and time.      Cranial Nerves: No cranial nerve deficit.            Assessment and Plan   Reducible left inguinal hernia, small reducible umbilical hernia    PLAN:    Recommend robot assisted left inguinal hernia repair, umbilical hernia repair    Risks and benefits explained with the patient including risks for infection, bleeding, injury to surrounding structures, hematoma/seroma formation with need for possible evacuation, possible open.  The patient verbalized understanding, agrees and wishes " to proceed with surgery.    Patient will need a CBC, BMP, EKG prior to surgery.      Follow up with cardiology for cardiac risk stratification; patient will need to stop Coumadin 5 days prior to surgery.              [1]   Current Outpatient Medications   Medication Sig Dispense Refill    warfarin (COUMADIN) 5 MG tablet Take 1 tablet (5 mg total) by mouth Daily. 90 tablet 1    cetirizine (ZYRTEC) 10 MG tablet Take 10 mg by mouth once daily.      ciprofloxacin HCl (CIPRO) 250 MG tablet Take 1 tablet (250 mg total) by mouth 2 (two) times daily. 14 tablet 0    coal tar (NEUTROGENA T-GEL) 0.5 % shampoo Apply topically nightly as needed. 240 mL 12    cyanocobalamin 1,000 mcg/mL injection INJECT 1 ML EVERY MONTH 1 mL 11    ezetimibe (ZETIA) 10 mg tablet Take 1 tablet (10 mg total) by mouth once daily. 90 tablet 1    furosemide (LASIX) 20 MG tablet Take 1 tablet (20 mg total) by mouth once daily. 90 tablet 1    gabapentin (NEURONTIN) 100 MG capsule Take 100 mg by mouth every evening.      levothyroxine (SYNTHROID) 25 MCG tablet Take 1 tablet (25 mcg total) by mouth once daily. 90 tablet 1    losartan (COZAAR) 25 MG tablet Take 1 tablet (25 mg total) by mouth once daily. 90 tablet 1    magnesium oxide (MAG-OX) 400 mg (241.3 mg magnesium) tablet Take 1 tablet (400 mg total) by mouth once daily. 90 tablet 1    nitrofurantoin, macrocrystal-monohydrate, (MACROBID) 100 MG capsule Take 1 capsule (100 mg total) by mouth once daily. 30 capsule 5    oxybutynin (DITROPAN) 5 MG Tab Take 1 tablet (5 mg total) by mouth 2 (two) times daily. 180 tablet 1    potassium chloride (KLOR-CON) 10 MEQ TbSR Take 1 tablet (10 mEq total) by mouth once daily. 90 tablet 1    ranolazine (RANEXA) 500 MG Tb12 Take 1 tablet (500 mg total) by mouth 2 (two) times daily. 180 tablet 1    sertraline (ZOLOFT) 100 MG tablet Take 1 tablet (100 mg total) by mouth once daily. 90 tablet 1    spironolactone (ALDACTONE) 25 MG tablet Take 1 tablet (25 mg total) by  mouth once daily. 90 tablet 1     No current facility-administered medications for this visit.   [2]   Social History  Tobacco Use    Smoking status: Never     Passive exposure: Past (brothers and sisters.)    Smokeless tobacco: Never   Substance Use Topics    Alcohol use: Never    Drug use: Never

## 2025-04-16 NOTE — PROGRESS NOTES
History & Physical    Subjective     History of Present Illness:  69-year-old  female presents the clinic for evaluation for left groin bulge and chronic constipation.  Patient states he has noticed some pain in her left groin.  Patient has been having issues with constipation for several years; currently on lactulose, but does not taking regularly.  History of pulmonary embolism in 2017; on Coumadin.  Patient also follows up with Cardiology and mobile with Dr. Carlos Reed; states she saw him last month and had a stress test which was unremarkable.  Continued to complain of soreness in the left groin and sent to General surgery for evaluation.  Denies any chest pain/shortness of breath, nausea/vomiting/diarrhea, fever/chills.  Past surgical history of tubal ligation; no other abdominal surgeries; has had multiple venous ablations to the lower extremities, cardiac catheterization, bilateral knee replacements, tonsillectomy, adenoidectomy, partial thyroidectomy.    Chief Complaint   Patient presents with    Pre-op Exam     Pt here for possible hernia        Review of patient's allergies indicates:   Allergen Reactions    Bactrim [sulfamethoxazole-trimethoprim]      Patient states this medication made her tongue feel like it was on fire and dropped her INR.     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    BMI 32.0-32.9,adult     Bronchitis     CHF (congestive heart failure)     Cobalamin deficiency     Constipation 05/22/2023    Contusion of left knee 08/12/2022    Contusion of right knee 08/12/2022    Deep vein thrombosis     Depressive disorder     Diarrhea 12/07/2022    Fall 08/12/2022    General anesthetics causing adverse effect in therapeutic use     GERD  (gastroesophageal reflux disease)     Head injury 08/12/2022    Heart attack     20 yrs ago    Hypertension     Hypothyroidism     Lower extremity edema     Neuropathy     Osteoarthritis of both knees     Pain in left leg     Pain in right leg     Peripheral edema     Pulmonary embolism 2017    Stroke     Varicose veins of bilateral lower extremities with pain     Vitamin D deficiency      Past Surgical History:   Procedure Laterality Date    ABLATION, CHEMICAL SEALANT, VARICOSE VEIN Left 8/30/2024    Procedure: Left GSV VenaSeal Ablation;  Surgeon: Chris Hutchison DO;  Location: [facility];  Service: Peripheral Vascular;  Laterality: Left;    ABLATION, CHEMICAL SEALANT, VARICOSE VEIN Left 9/6/2024    Procedure: Left SSV VenaSeal Ablation;  Surgeon: Chris Hutchison DO;  Location: [facility];  Service: Peripheral Vascular;  Laterality: Left;    BILATERAL TUBAL LIGATION      CARDIAC CATHETERIZATION      COLONOSCOPY      EYE SURGERY      knee scope Left     SCLEROTHERAPY WITH ULTRASOUND GUIDANCE Right 08/29/2024    Right Distal GSV Varithena Ablation performed by Dr. Misael Hutchison    THYROIDECTOMY, PARTIAL      TONSILLECTOMY, ADENOIDECTOMY      TOTAL KNEE ARTHROPLASTY Bilateral     TOTAL KNEE ARTHROPLASTY Right     TUBAL LIGATION      VAGINAL DELIVERY      x 3     Family History   Problem Relation Name Age of Onset    Hypertension Mother      Heart disease Mother      Liver disease Father      Arthritis Sister      Heart disease Sister      Arthritis Brother      Heart disease Brother      Stroke Brother      No Known Problems Brother      No Known Problems Daughter      Drug abuse Son      No Known Problems Son       Social History[2]     Review of Systems:  Review of Systems   Constitutional: Negative.  Negative for fatigue and unexpected weight change.   HENT: Negative.  Negative for trouble swallowing.    Eyes: Negative.    Respiratory: Negative.  Negative for chest tightness and shortness of  "breath.    Cardiovascular: Negative.  Negative for chest pain.   Gastrointestinal:  Positive for constipation. Negative for abdominal pain, blood in stool and nausea.   Endocrine: Negative.    Genitourinary: Negative.  Negative for hematuria.   Musculoskeletal: Negative.  Negative for back pain and myalgias.   Neurological: Negative.  Negative for dizziness, speech difficulty, weakness and light-headedness.   Psychiatric/Behavioral: Negative.  Negative for agitation and behavioral problems.           Objective     Vital Signs (Most Recent)     5' 7" (1.702 m)  88 kg (194 lb)     Physical Exam:  Physical Exam  Constitutional:       General: She is not in acute distress.     Appearance: Normal appearance.   HENT:      Head: Normocephalic.   Cardiovascular:      Rate and Rhythm: Normal rate.   Pulmonary:      Effort: Pulmonary effort is normal. No respiratory distress.   Abdominal:      General: There is no distension.      Tenderness: There is no abdominal tenderness.      Hernia: A hernia is present. Hernia is present in the umbilical area and left inguinal area.          Comments: Reducible left inguinal hernia, small 2 cm umbilical hernia; positive tenderness to palpation   Musculoskeletal:         General: Normal range of motion.   Skin:     General: Skin is warm.      Coloration: Skin is not jaundiced.   Neurological:      General: No focal deficit present.      Mental Status: She is alert and oriented to person, place, and time.      Cranial Nerves: No cranial nerve deficit.            Assessment and Plan   Reducible left inguinal hernia, small reducible umbilical hernia    PLAN:    Recommend robot assisted left inguinal hernia repair, umbilical hernia repair    Risks and benefits explained with the patient including risks for infection, bleeding, injury to surrounding structures, hematoma/seroma formation with need for possible evacuation, possible open.  The patient verbalized understanding, agrees and wishes " to proceed with surgery.    Patient will need a CBC, BMP, EKG prior to surgery.      Follow up with cardiology for cardiac risk stratification; patient will need to stop Coumadin 5 days prior to surgery.              [1]   Current Outpatient Medications   Medication Sig Dispense Refill    warfarin (COUMADIN) 5 MG tablet Take 1 tablet (5 mg total) by mouth Daily. 90 tablet 1    cetirizine (ZYRTEC) 10 MG tablet Take 10 mg by mouth once daily.      ciprofloxacin HCl (CIPRO) 250 MG tablet Take 1 tablet (250 mg total) by mouth 2 (two) times daily. 14 tablet 0    coal tar (NEUTROGENA T-GEL) 0.5 % shampoo Apply topically nightly as needed. 240 mL 12    cyanocobalamin 1,000 mcg/mL injection INJECT 1 ML EVERY MONTH 1 mL 11    ezetimibe (ZETIA) 10 mg tablet Take 1 tablet (10 mg total) by mouth once daily. 90 tablet 1    furosemide (LASIX) 20 MG tablet Take 1 tablet (20 mg total) by mouth once daily. 90 tablet 1    gabapentin (NEURONTIN) 100 MG capsule Take 100 mg by mouth every evening.      levothyroxine (SYNTHROID) 25 MCG tablet Take 1 tablet (25 mcg total) by mouth once daily. 90 tablet 1    losartan (COZAAR) 25 MG tablet Take 1 tablet (25 mg total) by mouth once daily. 90 tablet 1    magnesium oxide (MAG-OX) 400 mg (241.3 mg magnesium) tablet Take 1 tablet (400 mg total) by mouth once daily. 90 tablet 1    nitrofurantoin, macrocrystal-monohydrate, (MACROBID) 100 MG capsule Take 1 capsule (100 mg total) by mouth once daily. 30 capsule 5    oxybutynin (DITROPAN) 5 MG Tab Take 1 tablet (5 mg total) by mouth 2 (two) times daily. 180 tablet 1    potassium chloride (KLOR-CON) 10 MEQ TbSR Take 1 tablet (10 mEq total) by mouth once daily. 90 tablet 1    ranolazine (RANEXA) 500 MG Tb12 Take 1 tablet (500 mg total) by mouth 2 (two) times daily. 180 tablet 1    sertraline (ZOLOFT) 100 MG tablet Take 1 tablet (100 mg total) by mouth once daily. 90 tablet 1    spironolactone (ALDACTONE) 25 MG tablet Take 1 tablet (25 mg total) by  mouth once daily. 90 tablet 1     No current facility-administered medications for this visit.   [2]   Social History  Tobacco Use    Smoking status: Never     Passive exposure: Past (brothers and sisters.)    Smokeless tobacco: Never   Substance Use Topics    Alcohol use: Never    Drug use: Never

## 2025-04-25 ENCOUNTER — OUTSIDE PLACE OF SERVICE (OUTPATIENT)
Dept: VASCULAR SURGERY | Facility: CLINIC | Age: 70
End: 2025-04-25
Payer: MEDICARE

## 2025-04-25 DIAGNOSIS — R60.0 EDEMA, LOWER EXTREMITY: Primary | ICD-10-CM

## 2025-04-25 DIAGNOSIS — I87.2 VENOUS INSUFFICIENCY (CHRONIC) (PERIPHERAL): ICD-10-CM

## 2025-05-02 ENCOUNTER — OFFICE VISIT (OUTPATIENT)
Dept: FAMILY MEDICINE | Facility: CLINIC | Age: 70
End: 2025-05-02
Payer: MEDICARE

## 2025-05-02 VITALS
DIASTOLIC BLOOD PRESSURE: 88 MMHG | OXYGEN SATURATION: 97 % | TEMPERATURE: 98 F | HEIGHT: 67 IN | BODY MASS INDEX: 30.82 KG/M2 | HEART RATE: 70 BPM | SYSTOLIC BLOOD PRESSURE: 136 MMHG | WEIGHT: 196.38 LBS

## 2025-05-02 DIAGNOSIS — N30.01 ACUTE CYSTITIS WITH HEMATURIA: ICD-10-CM

## 2025-05-02 DIAGNOSIS — R30.0 DYSURIA: ICD-10-CM

## 2025-05-02 DIAGNOSIS — R10.11 RIGHT UPPER QUADRANT ABDOMINAL PAIN: ICD-10-CM

## 2025-05-02 DIAGNOSIS — I10 HYPERTENSION, UNSPECIFIED TYPE: Primary | ICD-10-CM

## 2025-05-02 DIAGNOSIS — E03.9 HYPOTHYROIDISM, UNSPECIFIED TYPE: ICD-10-CM

## 2025-05-02 LAB
ALBUMIN SERPL BCP-MCNC: 3.7 G/DL (ref 3.4–4.8)
ALBUMIN/GLOB SERPL: 1.4 {RATIO}
ALP SERPL-CCNC: 86 U/L (ref 40–150)
ALT SERPL W P-5'-P-CCNC: 14 U/L
ANION GAP SERPL CALCULATED.3IONS-SCNC: 11 MMOL/L (ref 7–16)
AST SERPL W P-5'-P-CCNC: 27 U/L (ref 11–45)
BASOPHILS # BLD AUTO: 0.03 K/UL (ref 0–0.2)
BASOPHILS NFR BLD AUTO: 0.5 % (ref 0–1)
BILIRUB SERPL-MCNC: 1.2 MG/DL
BILIRUB SERPL-MCNC: NEGATIVE MG/DL
BLOOD URINE, POC: ABNORMAL
BUN SERPL-MCNC: 14 MG/DL (ref 10–20)
BUN/CREAT SERPL: 13 (ref 6–20)
CALCIUM SERPL-MCNC: 9.2 MG/DL (ref 8.4–10.2)
CHLORIDE SERPL-SCNC: 107 MMOL/L (ref 98–107)
CHOLEST SERPL-MCNC: 195 MG/DL
CHOLEST/HDLC SERPL: 5.9 {RATIO}
CLARITY, UA: CLEAR
CO2 SERPL-SCNC: 26 MMOL/L (ref 23–31)
COLOR, UA: YELLOW
CREAT SERPL-MCNC: 1.07 MG/DL (ref 0.55–1.02)
DIFFERENTIAL METHOD BLD: ABNORMAL
EGFR (NO RACE VARIABLE) (RUSH/TITUS): 56 ML/MIN/1.73M2
EOSINOPHIL # BLD AUTO: 0.13 K/UL (ref 0–0.5)
EOSINOPHIL NFR BLD AUTO: 2.1 % (ref 1–4)
ERYTHROCYTE [DISTWIDTH] IN BLOOD BY AUTOMATED COUNT: 13.1 % (ref 11.5–14.5)
GLOBULIN SER-MCNC: 2.6 G/DL (ref 2–4)
GLUCOSE SERPL-MCNC: 101 MG/DL (ref 82–115)
GLUCOSE UR QL STRIP: NEGATIVE
HCT VFR BLD AUTO: 40.3 % (ref 38–47)
HDLC SERPL-MCNC: 33 MG/DL (ref 35–60)
HGB BLD-MCNC: 13.2 G/DL (ref 12–16)
IMM GRANULOCYTES # BLD AUTO: 0.02 K/UL (ref 0–0.04)
IMM GRANULOCYTES NFR BLD: 0.3 % (ref 0–0.4)
KETONES UR QL STRIP: NEGATIVE
LDLC SERPL CALC-MCNC: 128 MG/DL
LDLC/HDLC SERPL: 3.9 {RATIO}
LEUKOCYTE ESTERASE URINE, POC: ABNORMAL
LYMPHOCYTES # BLD AUTO: 1.86 K/UL (ref 1–4.8)
LYMPHOCYTES NFR BLD AUTO: 30.3 % (ref 27–41)
MCH RBC QN AUTO: 31.9 PG (ref 27–31)
MCHC RBC AUTO-ENTMCNC: 32.8 G/DL (ref 32–36)
MCV RBC AUTO: 97.3 FL (ref 80–96)
MONOCYTES # BLD AUTO: 0.55 K/UL (ref 0–0.8)
MONOCYTES NFR BLD AUTO: 9 % (ref 2–6)
MPC BLD CALC-MCNC: 10.1 FL (ref 9.4–12.4)
NEUTROPHILS # BLD AUTO: 3.55 K/UL (ref 1.8–7.7)
NEUTROPHILS NFR BLD AUTO: 57.8 % (ref 53–65)
NITRITE, POC UA: NEGATIVE
NONHDLC SERPL-MCNC: 162 MG/DL
NRBC # BLD AUTO: 0 X10E3/UL
NRBC, AUTO (.00): 0 %
PH, POC UA: 6
PLATELET # BLD AUTO: 163 K/UL (ref 150–400)
POTASSIUM SERPL-SCNC: 4.1 MMOL/L (ref 3.5–5.1)
PROT SERPL-MCNC: 6.3 G/DL (ref 5.8–7.6)
PROTEIN, POC: ABNORMAL
RBC # BLD AUTO: 4.14 M/UL (ref 4.2–5.4)
SODIUM SERPL-SCNC: 140 MMOL/L (ref 136–145)
SPECIFIC GRAVITY, POC UA: 1.01
T4 FREE SERPL-MCNC: 1.07 NG/DL (ref 0.7–1.48)
TRIGL SERPL-MCNC: 168 MG/DL (ref 37–140)
TSH SERPL DL<=0.005 MIU/L-ACNC: 1.76 UIU/ML (ref 0.35–4.94)
UROBILINOGEN, POC UA: 0.2
VLDLC SERPL-MCNC: 34 MG/DL
WBC # BLD AUTO: 6.14 K/UL (ref 4.5–11)

## 2025-05-02 PROCEDURE — 85025 COMPLETE CBC W/AUTO DIFF WBC: CPT | Mod: ,,, | Performed by: CLINICAL MEDICAL LABORATORY

## 2025-05-02 PROCEDURE — 80061 LIPID PANEL: CPT | Mod: ,,, | Performed by: CLINICAL MEDICAL LABORATORY

## 2025-05-02 PROCEDURE — 80053 COMPREHEN METABOLIC PANEL: CPT | Mod: ,,, | Performed by: CLINICAL MEDICAL LABORATORY

## 2025-05-02 PROCEDURE — 96372 THER/PROPH/DIAG INJ SC/IM: CPT | Mod: ,,, | Performed by: FAMILY MEDICINE

## 2025-05-02 PROCEDURE — 84439 ASSAY OF FREE THYROXINE: CPT | Mod: ,,, | Performed by: CLINICAL MEDICAL LABORATORY

## 2025-05-02 PROCEDURE — 99214 OFFICE O/P EST MOD 30 MIN: CPT | Mod: ,,, | Performed by: FAMILY MEDICINE

## 2025-05-02 PROCEDURE — 84443 ASSAY THYROID STIM HORMONE: CPT | Mod: ,,, | Performed by: CLINICAL MEDICAL LABORATORY

## 2025-05-02 RX ORDER — CEFTRIAXONE 1 G/1
1 INJECTION, POWDER, FOR SOLUTION INTRAMUSCULAR; INTRAVENOUS
Status: COMPLETED | OUTPATIENT
Start: 2025-05-02 | End: 2025-05-02

## 2025-05-02 RX ORDER — POTASSIUM CHLORIDE 750 MG/1
10 TABLET, EXTENDED RELEASE ORAL DAILY
Qty: 90 TABLET | Refills: 1 | Status: SHIPPED | OUTPATIENT
Start: 2025-05-02

## 2025-05-02 RX ORDER — OXYBUTYNIN CHLORIDE 5 MG/1
5 TABLET ORAL 2 TIMES DAILY
Qty: 180 TABLET | Refills: 1 | Status: SHIPPED | OUTPATIENT
Start: 2025-05-02

## 2025-05-02 RX ORDER — SPIRONOLACTONE 25 MG/1
25 TABLET ORAL DAILY
Qty: 90 TABLET | Refills: 1 | Status: SHIPPED | OUTPATIENT
Start: 2025-05-02

## 2025-05-02 RX ORDER — LANOLIN ALCOHOL/MO/W.PET/CERES
1 CREAM (GRAM) TOPICAL DAILY
Qty: 90 TABLET | Refills: 1 | Status: SHIPPED | OUTPATIENT
Start: 2025-05-02

## 2025-05-02 RX ORDER — ENOXAPARIN SODIUM 100 MG/ML
INJECTION SUBCUTANEOUS
COMMUNITY
Start: 2025-04-30

## 2025-05-02 RX ORDER — EZETIMIBE 10 MG/1
10 TABLET ORAL DAILY
Qty: 90 TABLET | Refills: 1 | Status: SHIPPED | OUTPATIENT
Start: 2025-05-02

## 2025-05-02 RX ORDER — NITROFURANTOIN 25; 75 MG/1; MG/1
100 CAPSULE ORAL DAILY
Qty: 30 CAPSULE | Refills: 5 | Status: SHIPPED | OUTPATIENT
Start: 2025-05-02

## 2025-05-02 RX ORDER — LEVOTHYROXINE SODIUM 25 UG/1
25 TABLET ORAL DAILY
Qty: 90 TABLET | Refills: 1 | Status: SHIPPED | OUTPATIENT
Start: 2025-05-02

## 2025-05-02 RX ORDER — FUROSEMIDE 20 MG/1
20 TABLET ORAL DAILY
Qty: 90 TABLET | Refills: 1 | Status: SHIPPED | OUTPATIENT
Start: 2025-05-02

## 2025-05-02 RX ORDER — RANOLAZINE 500 MG/1
500 TABLET, EXTENDED RELEASE ORAL 2 TIMES DAILY
Qty: 180 TABLET | Refills: 1 | Status: SHIPPED | OUTPATIENT
Start: 2025-05-02

## 2025-05-02 RX ORDER — SERTRALINE HYDROCHLORIDE 100 MG/1
100 TABLET, FILM COATED ORAL DAILY
Qty: 90 TABLET | Refills: 1 | Status: SHIPPED | OUTPATIENT
Start: 2025-05-02

## 2025-05-02 RX ADMIN — CEFTRIAXONE 1 G: 1 INJECTION, POWDER, FOR SOLUTION INTRAMUSCULAR; INTRAVENOUS at 09:05

## 2025-05-02 NOTE — PROGRESS NOTES
Tim Burgos MD   [facility]  [address] [place] [address]     PATIENT NAME: Victoria Delarosa  : 1955  DATE: 25  MRN: [id number]      Billing Provider: Tim Burgos MD  Level of Service:   Patient PCP Information       Provider PCP Type    Tim Burgos MD General            Reason for Visit / Chief Complaint: Hypertension (Check up; Labs; Refills. ), Hypothyroidism, Dysuria (Milky colored urine started at the first of this week. Burning with urination. ), and Health Maintenance (Reviewed vaccines-no vaccines needed at visit. )         History of Present Illness / Problem Focused Workflow     Victoria Delarosa presents to the clinic with Hypertension (Check up; Labs; Refills. ), Hypothyroidism, Dysuria (Milky colored urine started at the first of this week. Burning with urination. ), and Health Maintenance (Reviewed vaccines-no vaccines needed at visit. )     HPI    Review of Systems     Review of Systems   Constitutional:  Negative for activity change, appetite change, fatigue and fever.   HENT:  Negative for nasal congestion, ear pain, hearing loss, sinus pressure/congestion and sore throat.    Respiratory:  Negative for cough, chest tightness and shortness of breath.    Cardiovascular:  Negative for chest pain and palpitations.   Gastrointestinal:  Negative for abdominal pain and fecal incontinence.   Genitourinary:  Positive for difficulty urinating, dysuria, flank pain and hematuria. Negative for bladder incontinence.   Musculoskeletal:  Negative for arthralgias.   Integumentary:  Negative for rash.   Neurological:  Negative for dizziness and headaches.        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    BMI 32.0-32.9,adult     Bronchitis     CHF (congestive heart failure)     Cobalamin  deficiency     Constipation 05/22/2023    Contusion of left knee 08/12/2022    Contusion of right knee 08/12/2022    Deep vein thrombosis     Depressive disorder     Diarrhea 12/07/2022    Fall 08/12/2022    General anesthetics causing adverse effect in therapeutic use     GERD (gastroesophageal reflux disease)     Head injury 08/12/2022    Heart attack     20 yrs ago    Hypertension     Hypothyroidism     Lower extremity edema     Neuropathy     Osteoarthritis of both knees     Pain in left leg     Pain in right leg     Peripheral edema     Pulmonary embolism 2017    Stroke     Varicose veins of bilateral lower extremities with pain     Vitamin D deficiency        Past Surgical History:   Procedure Laterality Date    ABLATION, CHEMICAL SEALANT, VARICOSE VEIN Left 8/30/2024    Procedure: Left GSV VenaSeal Ablation;  Surgeon: Chris Hutchison DO;  Location: [facility];  Service: Peripheral Vascular;  Laterality: Left;    ABLATION, CHEMICAL SEALANT, VARICOSE VEIN Left 9/6/2024    Procedure: Left SSV VenaSeal Ablation;  Surgeon: Chris Hutchison DO;  Location: [facility];  Service: Peripheral Vascular;  Laterality: Left;    BILATERAL TUBAL LIGATION      CARDIAC CATHETERIZATION      COLONOSCOPY      EYE SURGERY      knee scope Left     SCLEROTHERAPY WITH ULTRASOUND GUIDANCE Right 08/29/2024    Right Distal GSV Varithena Ablation performed by Dr. Misael Hutchison    THYROIDECTOMY, PARTIAL      TONSILLECTOMY, ADENOIDECTOMY      TOTAL KNEE ARTHROPLASTY Bilateral     TOTAL KNEE ARTHROPLASTY Right     TUBAL LIGATION      VAGINAL DELIVERY      x 3       Social History  Victoria Delarosa  reports that she has never smoked. She has been exposed to tobacco smoke. She has never used smokeless tobacco. She reports that she does not drink alcohol and does not use drugs.    Family History  Victoria Delarosa  family history includes Arthritis in her brother and sister; Drug abuse in her son; Heart disease in her brother,  mother, and sister; Hypertension in her mother; Liver disease in her father; No Known Problems in her brother, daughter, and son; Stroke in her brother.    Medications and Allergies     Medications  Outpatient Medications Marked as Taking for the 5/2/25 encounter (Office Visit) with Tim Burgos MD   Medication Sig Dispense Refill    coal tar (NEUTROGENA T-GEL) 0.5 % shampoo Apply topically nightly as needed. 240 mL 12    cyanocobalamin 1,000 mcg/mL injection INJECT 1 ML EVERY MONTH 1 mL 11    enoxaparin (LOVENOX) 100 mg/mL Syrg Inject into the skin.      warfarin (COUMADIN) 5 MG tablet Take 1 tablet (5 mg total) by mouth Daily. (Patient taking differently: Take 5 mg by mouth Daily. 5mg daily except Monday. Take 2.5mg.) 90 tablet 1    [DISCONTINUED] ezetimibe (ZETIA) 10 mg tablet Take 1 tablet (10 mg total) by mouth once daily. 90 tablet 1    [DISCONTINUED] furosemide (LASIX) 20 MG tablet Take 1 tablet (20 mg total) by mouth once daily. 90 tablet 1    [DISCONTINUED] levothyroxine (SYNTHROID) 25 MCG tablet Take 1 tablet (25 mcg total) by mouth once daily. 90 tablet 1    [DISCONTINUED] magnesium oxide (MAG-OX) 400 mg (241.3 mg magnesium) tablet Take 1 tablet (400 mg total) by mouth once daily. 90 tablet 1    [DISCONTINUED] nitrofurantoin, macrocrystal-monohydrate, (MACROBID) 100 MG capsule Take 1 capsule (100 mg total) by mouth once daily. 30 capsule 5    [DISCONTINUED] oxybutynin (DITROPAN) 5 MG Tab Take 1 tablet (5 mg total) by mouth 2 (two) times daily. 180 tablet 1    [DISCONTINUED] potassium chloride (KLOR-CON) 10 MEQ TbSR Take 1 tablet (10 mEq total) by mouth once daily. 90 tablet 1    [DISCONTINUED] ranolazine (RANEXA) 500 MG Tb12 Take 1 tablet (500 mg total) by mouth 2 (two) times daily. 180 tablet 1    [DISCONTINUED] sertraline (ZOLOFT) 100 MG tablet Take 1 tablet (100 mg total) by mouth once daily. 90 tablet 1    [DISCONTINUED] spironolactone (ALDACTONE) 25 MG tablet Take 1 tablet (25 mg total) by  mouth once daily. 90 tablet 1       Allergies  Review of patient's allergies indicates:   Allergen Reactions    Bactrim [sulfamethoxazole-trimethoprim]      Patient states this medication made her tongue feel like it was on fire and dropped her INR.     Lisinopril      Cough      Statins-hmg-coa reductase inhibitors Hives and Itching       Physical Examination     Vitals:    05/02/25 0836   BP: 136/88   Pulse: 70   Temp: 97.5 °F (36.4 °C)     Physical Exam  Constitutional:       General: She is not in acute distress.     Appearance: She is not ill-appearing.   HENT:      Head: Normocephalic and atraumatic.      Right Ear: Tympanic membrane and ear canal normal.      Left Ear: Tympanic membrane and ear canal normal.      Nose: Nose normal. No congestion or rhinorrhea.   Eyes:      Pupils: Pupils are equal, round, and reactive to light.   Cardiovascular:      Rate and Rhythm: Normal rate and regular rhythm.      Pulses: Normal pulses.      Heart sounds: No murmur heard.  Pulmonary:      Effort: No respiratory distress.      Breath sounds: No wheezing, rhonchi or rales.   Abdominal:      General: Bowel sounds are normal.      Palpations: Abdomen is soft.      Tenderness: There is no abdominal tenderness.      Hernia: No hernia is present.   Musculoskeletal:      Cervical back: Normal range of motion and neck supple.   Lymphadenopathy:      Cervical: No cervical adenopathy.   Skin:     General: Skin is warm and dry.   Neurological:      Mental Status: She is alert.   Psychiatric:         Behavior: Behavior normal.         Thought Content: Thought content normal.          Assessment and Plan (including Health Maintenance)   :    Plan:         Health Maintenance Due   Topic Date Due    TETANUS VACCINE  Never done    RSV Vaccine (Age 60+ and Pregnant patients) (1 - Risk 60-74 years 1-dose series) Never done    Pneumococcal Vaccines (Age 50+) (3 of 3 - PCV20 or PCV21) 06/04/2024    COVID-19 Vaccine (4 - 2024-25 season)  09/01/2024    Shingles Vaccine (2 of 2) 10/09/2024    Mammogram  06/04/2025       Problem List Items Addressed This Visit          Cardiac/Vascular    Hypertension - Primary    Relevant Orders    CBC Auto Differential    Comprehensive Metabolic Panel    Lipid Panel       Endocrine    Hypothyroidism    Relevant Orders    T4, Free    TSH     Other Visit Diagnoses         Dysuria        Relevant Orders    POCT URINALYSIS W/O SCOPE (Completed)      Right upper quadrant abdominal pain        Relevant Medications    oxybutynin (DITROPAN) 5 MG Tab          Hypertension, unspecified type  -     CBC Auto Differential; Future; Expected date: 05/02/2025  -     Comprehensive Metabolic Panel; Future; Expected date: 05/02/2025  -     Lipid Panel; Future; Expected date: 05/02/2025    Hypothyroidism, unspecified type  -     T4, Free; Future; Expected date: 05/02/2025  -     TSH; Future; Expected date: 05/02/2025    Dysuria  -     POCT URINALYSIS W/O SCOPE    Right upper quadrant abdominal pain  -     oxybutynin (DITROPAN) 5 MG Tab; Take 1 tablet (5 mg total) by mouth 2 (two) times daily.  Dispense: 180 tablet; Refill: 1    Other orders  -     spironolactone (ALDACTONE) 25 MG tablet; Take 1 tablet (25 mg total) by mouth once daily.  Dispense: 90 tablet; Refill: 1  -     sertraline (ZOLOFT) 100 MG tablet; Take 1 tablet (100 mg total) by mouth once daily.  Dispense: 90 tablet; Refill: 1  -     ranolazine (RANEXA) 500 MG Tb12; Take 1 tablet (500 mg total) by mouth 2 (two) times daily.  Dispense: 180 tablet; Refill: 1  -     potassium chloride (KLOR-CON) 10 MEQ TbSR; Take 1 tablet (10 mEq total) by mouth once daily.  Dispense: 90 tablet; Refill: 1  -     nitrofurantoin, macrocrystal-monohydrate, (MACROBID) 100 MG capsule; Take 1 capsule (100 mg total) by mouth once daily.  Dispense: 30 capsule; Refill: 5  -     magnesium oxide (MAG-OX) 400 mg (241.3 mg magnesium) tablet; Take 1 tablet (400 mg total) by mouth once daily.  Dispense: 90  tablet; Refill: 1  -     levothyroxine (SYNTHROID) 25 MCG tablet; Take 1 tablet (25 mcg total) by mouth once daily.  Dispense: 90 tablet; Refill: 1  -     furosemide (LASIX) 20 MG tablet; Take 1 tablet (20 mg total) by mouth once daily.  Dispense: 90 tablet; Refill: 1  -     ezetimibe (ZETIA) 10 mg tablet; Take 1 tablet (10 mg total) by mouth once daily.  Dispense: 90 tablet; Refill: 1       Health Maintenance Topics with due status: Not Due       Topic Last Completion Date    Colorectal Cancer Screening 11/28/2022    DEXA Scan 08/10/2023    Influenza Vaccine 01/10/2024    Lipid Panel 10/21/2024       Procedures     Future Appointments   Date Time Provider Department Center   7/22/2025  8:20 AM [facility] M[facility]   8/19/2025  8:30 AM [facility] [place]   9/9/2025 10:15 AM Chris Hutchison DO [facility]        No follow-ups on file.       Signature:  Tim Burgos MD  [facility]  [address] [facility] [place] [address]  [phone] Phone  [phone] Fax    Date of encounter: 5/2/25

## 2025-05-12 ENCOUNTER — CLINICAL SUPPORT (OUTPATIENT)
Dept: CARDIOLOGY | Facility: CLINIC | Age: 70
End: 2025-05-12
Payer: MEDICARE

## 2025-05-12 DIAGNOSIS — K40.90 NON-RECURRENT UNILATERAL INGUINAL HERNIA WITHOUT OBSTRUCTION OR GANGRENE: ICD-10-CM

## 2025-05-12 DIAGNOSIS — Z01.818 PRE-PROCEDURAL EXAMINATION: ICD-10-CM

## 2025-05-12 PROCEDURE — 99999 PR PBB SHADOW E&M-EST. PATIENT-LVL II: CPT | Mod: PBBFAC,,,

## 2025-05-12 PROCEDURE — 99212 OFFICE O/P EST SF 10 MIN: CPT | Mod: PBBFAC

## 2025-05-13 ENCOUNTER — TELEPHONE (OUTPATIENT)
Dept: SURGERY | Facility: CLINIC | Age: 70
End: 2025-05-13
Payer: MEDICARE

## 2025-05-13 NOTE — TELEPHONE ENCOUNTER
Called Dr. Reed office for update on cardiac clearance, cardiac clearance request had been received, message sent to Dr. Reed staff.

## 2025-05-16 ENCOUNTER — ANESTHESIA EVENT (OUTPATIENT)
Dept: SURGERY | Facility: HOSPITAL | Age: 70
End: 2025-05-16
Payer: MEDICARE

## 2025-05-16 ENCOUNTER — HOSPITAL ENCOUNTER (OUTPATIENT)
Facility: HOSPITAL | Age: 70
Discharge: HOME | End: 2025-05-16
Attending: STUDENT IN AN ORGANIZED HEALTH CARE EDUCATION/TRAINING PROGRAM | Admitting: STUDENT IN AN ORGANIZED HEALTH CARE EDUCATION/TRAINING PROGRAM
Payer: MEDICARE

## 2025-05-16 ENCOUNTER — ANESTHESIA (OUTPATIENT)
Dept: SURGERY | Facility: HOSPITAL | Age: 70
End: 2025-05-16
Payer: MEDICARE

## 2025-05-16 VITALS
HEART RATE: 96 BPM | SYSTOLIC BLOOD PRESSURE: 106 MMHG | BODY MASS INDEX: 31.02 KG/M2 | DIASTOLIC BLOOD PRESSURE: 67 MMHG | TEMPERATURE: 98 F | HEIGHT: 66 IN | WEIGHT: 193 LBS | RESPIRATION RATE: 12 BRPM | OXYGEN SATURATION: 95 %

## 2025-05-16 DIAGNOSIS — I49.9 IRREGULAR HEART BEAT: ICD-10-CM

## 2025-05-16 DIAGNOSIS — Z01.818 PRE-PROCEDURAL EXAMINATION: ICD-10-CM

## 2025-05-16 DIAGNOSIS — K40.90 NON-RECURRENT UNILATERAL INGUINAL HERNIA WITHOUT OBSTRUCTION OR GANGRENE: ICD-10-CM

## 2025-05-16 PROCEDURE — 27000165 HC TUBE, ETT CUFFED: Performed by: ANESTHESIOLOGY

## 2025-05-16 PROCEDURE — 27000510 HC BLANKET BAIR HUGGER ANY SIZE: Performed by: ANESTHESIOLOGY

## 2025-05-16 PROCEDURE — 25000003 PHARM REV CODE 250: Performed by: STUDENT IN AN ORGANIZED HEALTH CARE EDUCATION/TRAINING PROGRAM

## 2025-05-16 PROCEDURE — 63600175 PHARM REV CODE 636 W HCPCS: Performed by: NURSE ANESTHETIST, CERTIFIED REGISTERED

## 2025-05-16 PROCEDURE — 63600175 PHARM REV CODE 636 W HCPCS: Performed by: STUDENT IN AN ORGANIZED HEALTH CARE EDUCATION/TRAINING PROGRAM

## 2025-05-16 PROCEDURE — 27201423 OPTIME MED/SURG SUP & DEVICES STERILE SUPPLY: Performed by: STUDENT IN AN ORGANIZED HEALTH CARE EDUCATION/TRAINING PROGRAM

## 2025-05-16 PROCEDURE — C1781 MESH (IMPLANTABLE): HCPCS | Performed by: STUDENT IN AN ORGANIZED HEALTH CARE EDUCATION/TRAINING PROGRAM

## 2025-05-16 PROCEDURE — 27000716 HC OXISENSOR PROBE, ANY SIZE: Performed by: ANESTHESIOLOGY

## 2025-05-16 PROCEDURE — 93005 ELECTROCARDIOGRAM TRACING: CPT

## 2025-05-16 PROCEDURE — 37000009 HC ANESTHESIA EA ADD 15 MINS: Performed by: STUDENT IN AN ORGANIZED HEALTH CARE EDUCATION/TRAINING PROGRAM

## 2025-05-16 PROCEDURE — 36000711: Performed by: STUDENT IN AN ORGANIZED HEALTH CARE EDUCATION/TRAINING PROGRAM

## 2025-05-16 PROCEDURE — 27000689 HC BLADE LARYNGOSCOPE ANY SIZE: Performed by: ANESTHESIOLOGY

## 2025-05-16 PROCEDURE — 71000015 HC POSTOP RECOV 1ST HR: Performed by: STUDENT IN AN ORGANIZED HEALTH CARE EDUCATION/TRAINING PROGRAM

## 2025-05-16 PROCEDURE — 71000039 HC RECOVERY, EACH ADD'L HOUR: Performed by: STUDENT IN AN ORGANIZED HEALTH CARE EDUCATION/TRAINING PROGRAM

## 2025-05-16 PROCEDURE — 25000003 PHARM REV CODE 250: Performed by: NURSE ANESTHETIST, CERTIFIED REGISTERED

## 2025-05-16 PROCEDURE — 27000655: Performed by: ANESTHESIOLOGY

## 2025-05-16 PROCEDURE — 71000016 HC POSTOP RECOV ADDL HR: Performed by: STUDENT IN AN ORGANIZED HEALTH CARE EDUCATION/TRAINING PROGRAM

## 2025-05-16 PROCEDURE — 36000710: Performed by: STUDENT IN AN ORGANIZED HEALTH CARE EDUCATION/TRAINING PROGRAM

## 2025-05-16 PROCEDURE — 63600175 PHARM REV CODE 636 W HCPCS: Performed by: ANESTHESIOLOGY

## 2025-05-16 PROCEDURE — 71000033 HC RECOVERY, INTIAL HOUR: Performed by: STUDENT IN AN ORGANIZED HEALTH CARE EDUCATION/TRAINING PROGRAM

## 2025-05-16 PROCEDURE — 49650 LAP ING HERNIA REPAIR INIT: CPT | Mod: 50,,, | Performed by: STUDENT IN AN ORGANIZED HEALTH CARE EDUCATION/TRAINING PROGRAM

## 2025-05-16 PROCEDURE — 37000008 HC ANESTHESIA 1ST 15 MINUTES: Performed by: STUDENT IN AN ORGANIZED HEALTH CARE EDUCATION/TRAINING PROGRAM

## 2025-05-16 PROCEDURE — 93010 ELECTROCARDIOGRAM REPORT: CPT | Mod: ,,, | Performed by: INTERNAL MEDICINE

## 2025-05-16 DEVICE — LAPAROSCOPIC SELF-FIXATING MESH POLYESTER WITH POLYLACTIC ACID GRIPS AND COLLAGEN FILM
Type: IMPLANTABLE DEVICE | Site: INGUINAL | Status: FUNCTIONAL
Brand: PROGRIP

## 2025-05-16 RX ORDER — MEPERIDINE HYDROCHLORIDE 25 MG/ML
25 INJECTION INTRAMUSCULAR; INTRAVENOUS; SUBCUTANEOUS EVERY 10 MIN PRN
Status: DISCONTINUED | OUTPATIENT
Start: 2025-05-16 | End: 2025-05-16 | Stop reason: HOSPADM

## 2025-05-16 RX ORDER — PHENYLEPHRINE HYDROCHLORIDE 10 MG/ML
INJECTION INTRAVENOUS
Status: DISCONTINUED | OUTPATIENT
Start: 2025-05-16 | End: 2025-05-16

## 2025-05-16 RX ORDER — MIDAZOLAM HYDROCHLORIDE 1 MG/ML
INJECTION INTRAMUSCULAR; INTRAVENOUS
Status: DISCONTINUED | OUTPATIENT
Start: 2025-05-16 | End: 2025-05-16

## 2025-05-16 RX ORDER — PROPOFOL 10 MG/ML
VIAL (ML) INTRAVENOUS
Status: DISCONTINUED | OUTPATIENT
Start: 2025-05-16 | End: 2025-05-16

## 2025-05-16 RX ORDER — ONDANSETRON HYDROCHLORIDE 2 MG/ML
4 INJECTION, SOLUTION INTRAVENOUS DAILY PRN
Status: DISCONTINUED | OUTPATIENT
Start: 2025-05-16 | End: 2025-05-16 | Stop reason: HOSPADM

## 2025-05-16 RX ORDER — GLUCAGON 1 MG
1 KIT INJECTION
Status: DISCONTINUED | OUTPATIENT
Start: 2025-05-16 | End: 2025-05-16 | Stop reason: HOSPADM

## 2025-05-16 RX ORDER — DIPHENHYDRAMINE HYDROCHLORIDE 50 MG/ML
25 INJECTION, SOLUTION INTRAMUSCULAR; INTRAVENOUS EVERY 6 HOURS PRN
Status: DISCONTINUED | OUTPATIENT
Start: 2025-05-16 | End: 2025-05-16 | Stop reason: HOSPADM

## 2025-05-16 RX ORDER — ONDANSETRON HYDROCHLORIDE 2 MG/ML
INJECTION, SOLUTION INTRAVENOUS
Status: DISCONTINUED | OUTPATIENT
Start: 2025-05-16 | End: 2025-05-16

## 2025-05-16 RX ORDER — SODIUM CHLORIDE 9 MG/ML
INJECTION, SOLUTION INTRAVENOUS CONTINUOUS
Status: DISCONTINUED | OUTPATIENT
Start: 2025-05-16 | End: 2025-05-16 | Stop reason: HOSPADM

## 2025-05-16 RX ORDER — EPHEDRINE SULFATE 50 MG/ML
INJECTION, SOLUTION INTRAVENOUS
Status: DISCONTINUED | OUTPATIENT
Start: 2025-05-16 | End: 2025-05-16

## 2025-05-16 RX ORDER — TRAMADOL HYDROCHLORIDE 50 MG/1
50 TABLET, FILM COATED ORAL EVERY 6 HOURS PRN
Qty: 20 TABLET | Refills: 0 | Status: SHIPPED | OUTPATIENT
Start: 2025-05-16

## 2025-05-16 RX ORDER — ROCURONIUM BROMIDE 10 MG/ML
INJECTION, SOLUTION INTRAVENOUS
Status: DISCONTINUED | OUTPATIENT
Start: 2025-05-16 | End: 2025-05-16

## 2025-05-16 RX ORDER — MORPHINE SULFATE 10 MG/ML
4 INJECTION INTRAMUSCULAR; INTRAVENOUS; SUBCUTANEOUS EVERY 5 MIN PRN
Status: DISCONTINUED | OUTPATIENT
Start: 2025-05-16 | End: 2025-05-16 | Stop reason: HOSPADM

## 2025-05-16 RX ORDER — CEFAZOLIN SODIUM 1 G/3ML
INJECTION, POWDER, FOR SOLUTION INTRAMUSCULAR; INTRAVENOUS
Status: DISCONTINUED | OUTPATIENT
Start: 2025-05-16 | End: 2025-05-16

## 2025-05-16 RX ORDER — HYDROMORPHONE HYDROCHLORIDE 2 MG/ML
0.5 INJECTION, SOLUTION INTRAMUSCULAR; INTRAVENOUS; SUBCUTANEOUS EVERY 5 MIN PRN
Status: DISCONTINUED | OUTPATIENT
Start: 2025-05-16 | End: 2025-05-16 | Stop reason: HOSPADM

## 2025-05-16 RX ORDER — CEFAZOLIN 2 G/1
2 INJECTION, POWDER, FOR SOLUTION INTRAMUSCULAR; INTRAVENOUS
Status: DISCONTINUED | OUTPATIENT
Start: 2025-05-16 | End: 2025-05-16 | Stop reason: HOSPADM

## 2025-05-16 RX ORDER — FENTANYL CITRATE 50 UG/ML
INJECTION, SOLUTION INTRAMUSCULAR; INTRAVENOUS
Status: DISCONTINUED | OUTPATIENT
Start: 2025-05-16 | End: 2025-05-16

## 2025-05-16 RX ORDER — BUPIVACAINE HYDROCHLORIDE 2.5 MG/ML
INJECTION, SOLUTION EPIDURAL; INFILTRATION; INTRACAUDAL; PERINEURAL
Status: DISCONTINUED | OUTPATIENT
Start: 2025-05-16 | End: 2025-05-16 | Stop reason: HOSPADM

## 2025-05-16 RX ORDER — DEXAMETHASONE SODIUM PHOSPHATE 4 MG/ML
INJECTION, SOLUTION INTRA-ARTICULAR; INTRALESIONAL; INTRAMUSCULAR; INTRAVENOUS; SOFT TISSUE
Status: DISCONTINUED | OUTPATIENT
Start: 2025-05-16 | End: 2025-05-16

## 2025-05-16 RX ORDER — LIDOCAINE HYDROCHLORIDE 20 MG/ML
INJECTION, SOLUTION EPIDURAL; INFILTRATION; INTRACAUDAL; PERINEURAL
Status: DISCONTINUED | OUTPATIENT
Start: 2025-05-16 | End: 2025-05-16

## 2025-05-16 RX ORDER — ACETAMINOPHEN 10 MG/ML
1000 INJECTION, SOLUTION INTRAVENOUS ONCE
Status: COMPLETED | OUTPATIENT
Start: 2025-05-16 | End: 2025-05-16

## 2025-05-16 RX ADMIN — EPHEDRINE SULFATE 25 MG: 50 INJECTION INTRAVENOUS at 08:05

## 2025-05-16 RX ADMIN — ROCURONIUM BROMIDE 15 MG: 10 INJECTION, SOLUTION INTRAVENOUS at 09:05

## 2025-05-16 RX ADMIN — MIDAZOLAM HYDROCHLORIDE 2 MG: 1 INJECTION, SOLUTION INTRAMUSCULAR; INTRAVENOUS at 07:05

## 2025-05-16 RX ADMIN — SODIUM CHLORIDE: 9 INJECTION, SOLUTION INTRAVENOUS at 07:05

## 2025-05-16 RX ADMIN — DEXAMETHASONE SODIUM PHOSPHATE 8 MG: 4 INJECTION, SOLUTION INTRA-ARTICULAR; INTRALESIONAL; INTRAMUSCULAR; INTRAVENOUS; SOFT TISSUE at 07:05

## 2025-05-16 RX ADMIN — ROCURONIUM BROMIDE 10 MG: 10 INJECTION, SOLUTION INTRAVENOUS at 08:05

## 2025-05-16 RX ADMIN — CEFAZOLIN 2 G: 1 INJECTION, POWDER, FOR SOLUTION INTRAMUSCULAR; INTRAVENOUS; PARENTERAL at 07:05

## 2025-05-16 RX ADMIN — ACETAMINOPHEN 1000 MG: 10 INJECTION INTRAVENOUS at 11:05

## 2025-05-16 RX ADMIN — HYDROMORPHONE HYDROCHLORIDE 0.5 MG: 2 INJECTION, SOLUTION INTRAMUSCULAR; INTRAVENOUS; SUBCUTANEOUS at 11:05

## 2025-05-16 RX ADMIN — FENTANYL CITRATE 100 MCG: 50 INJECTION, SOLUTION INTRAMUSCULAR; INTRAVENOUS at 07:05

## 2025-05-16 RX ADMIN — FENTANYL CITRATE 100 MCG: 50 INJECTION, SOLUTION INTRAMUSCULAR; INTRAVENOUS at 08:05

## 2025-05-16 RX ADMIN — HYDROMORPHONE HYDROCHLORIDE 0.5 MG: 2 INJECTION, SOLUTION INTRAMUSCULAR; INTRAVENOUS; SUBCUTANEOUS at 10:05

## 2025-05-16 RX ADMIN — ONDANSETRON 4 MG: 2 INJECTION INTRAMUSCULAR; INTRAVENOUS at 07:05

## 2025-05-16 RX ADMIN — PHENYLEPHRINE HYDROCHLORIDE 100 MCG: 10 INJECTION INTRAVENOUS at 08:05

## 2025-05-16 RX ADMIN — LIDOCAINE HYDROCHLORIDE 80 MG: 20 INJECTION, SOLUTION EPIDURAL; INFILTRATION; INTRACAUDAL; PERINEURAL at 07:05

## 2025-05-16 RX ADMIN — SUGAMMADEX 200 MG: 100 INJECTION, SOLUTION INTRAVENOUS at 09:05

## 2025-05-16 RX ADMIN — PROPOFOL 130 MG: 10 INJECTION, EMULSION INTRAVENOUS at 07:05

## 2025-05-16 RX ADMIN — ROCURONIUM BROMIDE 40 MG: 10 INJECTION, SOLUTION INTRAVENOUS at 07:05

## 2025-05-16 NOTE — PROGRESS NOTES
1006 RECEIVED TO RR SEDATED WITH ORAL AIRWAY IN PLACE. O2 VIA FM. HOB ELEVATED. ABDOMEN SOFT WITH OP-SITES X3 D/I. SCD HOSE IN PROGRESS. IV INFUSING WELL LEFT HAND 20G. CATH. OBSERVING CLOSELY. SEE FLOW SHEET.    1015 AWAKEN, ORAL AIRWAY REMOVED, ORIENTATION GIVEN, DOZED BACK OFF TO SLEEP. O2 CONTINUED VIA FM.    1030 HEART RHYTHM IRREGULAR. DR. TATE MADE AWARE AND AT BEDSIDE.EKG ORDERED.    1100 EKG DONE RESULTS SINUS RHYTHM WITH 1ST DEGREE AV BLOCK WITH PAC'S WITH SEPTAL INFART, AG UNDETERMINED.  DR. TATE TEXTED TO VIEW EKG.. PATIENT C/O STOMACH HURTING, DILAUDID TITRATED FOR RELIEF.    1110 DR. TATE AT BEDSIDE. AWARE OF EKG RESULTS NO FURTHER ORDERS EXCEPT OFIRMEV FOR PAIN.    1130 SLEEPING UNLESS AROUSED. RESP. 12B/MIN, O2 NC 2 L. NO FURTHER PAIN MEDS GIVEN. ABDOMEN SOFT WITH DRESSING D/I. TRANSFERRED TO ROOM.

## 2025-05-16 NOTE — ANESTHESIA PROCEDURE NOTES
Intubation    Date/Time: 5/16/2025 7:43 AM    Performed by: Quita Guillory CRNA  Authorized by: Clifton Pereira MD    Intubation:     Induction:  Intravenous    Intubated:  Postinduction    Mask Ventilation:  Easy mask    Attempts:  1    Attempted By:  CRNA    Method of Intubation:  Direct    Blade:  Grupo 4    Laryngeal View Grade: Grade IIA - cords partially seen      Difficult Airway Encountered?: No      Complications:  None    Airway Device:  Oral endotracheal tube    Airway Device Size:  7.0    Style/Cuff Inflation:  Cuffed    Inflation Amount (mL):  7    Tube secured:  21    Placement Verified By:  Capnometry    Complicating Factors:  None    Findings Post-Intubation:  BS equal bilateral and atraumatic/condition of teeth unchanged

## 2025-05-16 NOTE — OP NOTE
[facility] - Periop Services  Surgery Department  Operative Note    SUMMARY     Date of Procedure: 5/16/2025     Procedure: Procedure(s) (LRB):  XI ROBOTIC REPAIR, INGUINAL HERNIA (Bilateral)  XI ROBOTIC REPAIR, UMBILICAL HERNIA (N/A)     Surgeons and Role:     * Misael Quintero,  - Primary    Assisting Surgeon: None    Pre-Operative Diagnosis: Non-recurrent unilateral inguinal hernia without obstruction or gangrene [K40.90]    Post-Operative Diagnosis: Post-Op Diagnosis Codes:     * Non-recurrent unilateral inguinal hernia without obstruction or gangrene [K40.90]    Anesthesia: General    Operative Findings (including complications, if any): 2 large indirect inguinal hernias containing fat; 2 cm umbilical hernia containing fat    Description of Technical Procedures: Patient was taken to the operating room and placed on the operating table in the supine position.  Patient underwent general anesthesia per the anesthesia team.  The abdomen was then prepped and draped in usual sterile fashion.  An incision was made in left upper quadrant and a Veress needle was inserted and the abdomen was insufflated to 15 mmHg; patient tolerated insufflation well.  We then placed an 8 mm trocar in the left abdomen under visualization with the laparoscope, no injuries identified.  We then placed an additional 8 mm trocar in the mid abdomen and right upper quadrant, all under visualization.  We identified bilateral indirect inguinal hernia containing fat; also identified a 2 cm umbilical hernia containing fat.  Fat was reduced.  We placed 2 ProGrip mesh in the abdomen followed by a 2-0 double-arm Stratafix suture and a 0 V lock suture and then docked the robot.  We then took down the peritoneum spanning from the right ASIS to the left ASIS and dissected all the way down to the hernia sac and pubic tubercle bilaterally.  We reduced the hernia sacs and pulled the peritoneum off the round ligament and transected the round  ligament bilaterally. We placed the meshes over the myopectineal orifice bilaterally and then reapproximated the peritoneum back to the abdominal wall with the running 2-0 stratafix suture, incorporating the hernia sac to the suture line.  All bleeding was controlled electrocautery.  We then took down the fat from the 2 cm umbilical hernia and reduced the contents.  We then closed the hernia defect with the 0 V lock suture in multiple layers in running fashion.  The needles were removed. The abdomen was then desufflated, robot undocked, and all trocars were then removed.  Skin incisions approximated with a 4-0 Monocryl deep dermal suture.  The skin was cleaned and dried, Mastisol Steri-Strips applied.  Testicles withdrawn down in the scrotum.  Padilla catheter removed.  Patient was awakened, extubated and taken the PACU in stable condition.  Patient tolerated procedure well.         Estimated Blood Loss (EBL): 15 mL           Implants:   Implant Name Type Inv. Item Serial No.  Lot No. LRB No. Used Action   MESH LAP PG 10X15 DF FLATSHEET - [id number]  MESH LAP PG 10X15 DF FLATSHEET  [employer] [id number] Right 1 Implanted   MESH LAP PG 10X15 DF FLATSHEET - [id number]  MESH LAP PG 10X15 DF FLATSHEET  [employer] [id number] Left 1 Implanted       Specimens:   Specimen (24h ago, onward)      None           * No specimens in log *           Condition: Good    Disposition: PACU - hemodynamically stable.    Attestation: Op Note Attestation: I was physically present and scrubbed for the entire procedure.

## 2025-05-16 NOTE — BRIEF OP NOTE
[facility] - Periop Services  Brief Operative Note    Surgery Date: 5/16/2025     Surgeons and Role:     * Misael Quintero DO - Primary    Assisting Surgeon: None    Pre-op Diagnosis:  Non-recurrent unilateral inguinal hernia without obstruction or gangrene [K40.90]    Post-op Diagnosis:  Post-Op Diagnosis Codes:     * Non-recurrent unilateral inguinal hernia without obstruction or gangrene [K40.90]    Procedure(s) (LRB):  XI ROBOTIC REPAIR, INGUINAL HERNIA (Bilateral)  XI ROBOTIC REPAIR, UMBILICAL HERNIA (N/A)    Anesthesia: General    Operative Findings:  2 large indirect inguinal hernias containing fat; 2 cm umbilical hernia containing fat    Estimated Blood Loss: 15 mL         Specimens:   Specimen (24h ago, onward)      None            * No specimens in log *        Discharge Note    OUTCOME: Patient tolerated treatment/procedure well without complication and is now ready for discharge.    DISPOSITION: Home or Self Care    FINAL DIAGNOSIS:  Non-recurrent unilateral inguinal hernia without obstruction or gangrene    FOLLOWUP: In clinic    DISCHARGE INSTRUCTIONS:    Discharge Procedure Orders   Diet Adult Regular     Lifting restrictions   Order Comments: No lifting over 15 lb for the next 2 weeks; then nothing over 25 lb for the following 4 weeks      Remove dressing in 72 hours   Order Comments: Ok to shower tomorrow. Allow soap/water to rinse over wounds. Remove bandaids in 72 hours.  Leave strips in place. Do not scrub glue/strips off; will remove in clinic in 2 weeks. No tub baths or swimming.  Follow up in clinic in 2 weeks     Activity as tolerated

## 2025-05-16 NOTE — TRANSFER OF CARE
"Anesthesia Transfer of Care Note    Patient: Victoria Delarosa    Procedure(s) Performed: Procedure(s) (LRB):  XI ROBOTIC REPAIR, INGUINAL HERNIA (Bilateral)  XI ROBOTIC REPAIR, UMBILICAL HERNIA (N/A)    Patient location: PACU    Anesthesia Type: general    Transport from OR: Transported from OR on 6-10 L/min O2 by face mask with adequate spontaneous ventilation    Post pain: adequate analgesia    Post assessment: no apparent anesthetic complications    Post vital signs: stable    Level of consciousness: sedated    Nausea/Vomiting: no nausea/vomiting    Complications: none    Transfer of care protocol was followed    Last vitals: Visit Vitals  BP (!) 151/82   Pulse 82   Temp 36.5 °C (97.7 °F)   Resp 13   Ht 5' 6" (1.676 m)   Wt 87.5 kg (193 lb)   SpO2 97%   Breastfeeding No   BMI 31.15 kg/m²     "

## 2025-05-16 NOTE — ANESTHESIA PREPROCEDURE EVALUATION
05/16/2025  Victoria Delarosa is a 69 y.o., female.      Pre-op Assessment    I have reviewed the Patient Summary Reports.    I have reviewed the NPO Status.   I have reviewed the Medications.     Review of Systems         Anesthesia Plan  Type of Anesthesia, risks & benefits discussed:    Anesthesia Type: Gen ETT  Intra-op Monitoring Plan: Standard ASA Monitors  Post Op Pain Control Plan: IV/PO Opioids PRN  Induction:  IV  Informed Consent: Informed consent signed with the Patient and all parties understand the risks and agree with anesthesia plan.  All questions answered.   ASA Score: 3    Ready For Surgery From Anesthesia Perspective.     .  NPO greater than 8 hours  No anesthetic complications  Allergies      Lisinopril Drug Ingredient  Not Specified  5/26/2021   Cough    Statins-hmg-coa Reductase Inhibitors Drug Class Hives, Itching Not Specified  5/26/2021      Adverse Reactions/Drug Intolerances      Bactrim [Sulfamethoxazole-trimethoprim]          Hct 41    MI x2  HTN  H/o DVT/PE  Hiatal hernia  CKD  Depression  Hypothyroidism  Sjogren syndrome  Thoracic aortic aneurysm    MP II; adequate ROM at neck

## 2025-05-16 NOTE — ANESTHESIA POSTPROCEDURE EVALUATION
Anesthesia Post Evaluation    Patient: Victoria Delarosa    Procedure(s) Performed: Procedure(s) (LRB):  XI ROBOTIC REPAIR, INGUINAL HERNIA (Bilateral)  XI ROBOTIC REPAIR, UMBILICAL HERNIA (N/A)    Final Anesthesia Type: general      Patient location during evaluation: PACU  Post-procedure vital signs: reviewed and stable  Pain management: adequate  Airway patency: patent    PONV status at discharge: No PONV  Anesthetic complications: no      Cardiovascular status: hemodynamically stable  Respiratory status: unassisted  Hydration status: euvolemic  Follow-up not needed.              Vitals Value Taken Time   /67 05/16/25 12:31   Temp 36.5 °C (97.7 °F) 05/16/25 10:13   Pulse 85 05/16/25 12:32   Resp 9 05/16/25 11:33   SpO2 91 % 05/16/25 12:32   Vitals shown include unfiled device data.      Event Time   Out of Recovery 11:30:00         Pain/Mira Score: Pain Rating Prior to Med Admin: 0 (5/16/2025 11:40 AM)  Pain Rating Post Med Admin: 0 (5/16/2025 11:40 AM)  Mira Score: 10 (5/16/2025 11:40 AM)

## 2025-05-19 LAB
OHS QRS DURATION: 86 MS
OHS QTC CALCULATION: 470 MS

## 2025-05-29 ENCOUNTER — OFFICE VISIT (OUTPATIENT)
Dept: SURGERY | Facility: CLINIC | Age: 70
End: 2025-05-29
Payer: MEDICARE

## 2025-05-29 VITALS — HEIGHT: 66 IN | BODY MASS INDEX: 31 KG/M2 | WEIGHT: 192.88 LBS

## 2025-05-29 DIAGNOSIS — Z87.19 STATUS POST INGUINAL HERNIA REPAIR: Primary | ICD-10-CM

## 2025-05-29 DIAGNOSIS — Z09 STATUS POST UMBILICAL HERNIA REPAIR, FOLLOW-UP EXAM: ICD-10-CM

## 2025-05-29 DIAGNOSIS — Z98.890 STATUS POST INGUINAL HERNIA REPAIR: Primary | ICD-10-CM

## 2025-05-29 PROCEDURE — 99024 POSTOP FOLLOW-UP VISIT: CPT | Mod: ,,, | Performed by: NURSE PRACTITIONER

## 2025-05-29 PROCEDURE — 99999 PR PBB SHADOW E&M-EST. PATIENT-LVL III: CPT | Mod: PBBFAC,,, | Performed by: NURSE PRACTITIONER

## 2025-05-29 PROCEDURE — 99213 OFFICE O/P EST LOW 20 MIN: CPT | Mod: PBBFAC | Performed by: NURSE PRACTITIONER

## 2025-06-11 ENCOUNTER — OFFICE VISIT (OUTPATIENT)
Dept: FAMILY MEDICINE | Facility: CLINIC | Age: 70
End: 2025-06-11
Payer: MEDICARE

## 2025-06-11 VITALS
OXYGEN SATURATION: 98 % | SYSTOLIC BLOOD PRESSURE: 132 MMHG | WEIGHT: 192 LBS | DIASTOLIC BLOOD PRESSURE: 82 MMHG | HEIGHT: 66 IN | BODY MASS INDEX: 30.86 KG/M2 | TEMPERATURE: 98 F | HEART RATE: 78 BPM

## 2025-06-11 DIAGNOSIS — N30.01 ACUTE CYSTITIS WITH HEMATURIA: ICD-10-CM

## 2025-06-11 DIAGNOSIS — R30.0 DYSURIA: Primary | ICD-10-CM

## 2025-06-11 LAB
BILIRUB SERPL-MCNC: ABNORMAL MG/DL
BLOOD URINE, POC: ABNORMAL
CLARITY, UA: ABNORMAL
COLOR, UA: YELLOW
GLUCOSE UR QL STRIP: NEGATIVE
KETONES UR QL STRIP: NEGATIVE
LEUKOCYTE ESTERASE URINE, POC: ABNORMAL
NITRITE, POC UA: POSITIVE
PH, POC UA: 6
PROTEIN, POC: NEGATIVE
SPECIFIC GRAVITY, POC UA: 1.02
UROBILINOGEN, POC UA: 0.2

## 2025-06-11 PROCEDURE — 96372 THER/PROPH/DIAG INJ SC/IM: CPT | Mod: ,,, | Performed by: FAMILY MEDICINE

## 2025-06-11 PROCEDURE — 99213 OFFICE O/P EST LOW 20 MIN: CPT | Mod: ,,, | Performed by: FAMILY MEDICINE

## 2025-06-11 RX ORDER — CEFTRIAXONE 1 G/1
1 INJECTION, POWDER, FOR SOLUTION INTRAMUSCULAR; INTRAVENOUS
Status: COMPLETED | OUTPATIENT
Start: 2025-06-11 | End: 2025-06-11

## 2025-06-11 RX ORDER — CLINDAMYCIN HYDROCHLORIDE 300 MG/1
300 CAPSULE ORAL 3 TIMES DAILY
Qty: 21 CAPSULE | Refills: 0 | Status: SHIPPED | OUTPATIENT
Start: 2025-06-11

## 2025-06-11 RX ADMIN — CEFTRIAXONE 1 G: 1 INJECTION, POWDER, FOR SOLUTION INTRAMUSCULAR; INTRAVENOUS at 11:06

## 2025-06-11 NOTE — PROGRESS NOTES
Patient is 2 week post bilateral inguinal hernia and umbilical hernia repair per Dr Quintero.  Patient is doing well. Patient denies nausea or vomiting. Tolerating regular diet.  Reports regular daily or every other day bowel movements without blood or mucus in stool.  No dysuria.  Abdomen is soft and non-distended. Post op incision sites noted healing without redness or drainage.  Patient is encouraged to resume daily activities as tolerated.  She will follow up with general surgery clinic as needed.

## 2025-06-18 RX ORDER — WARFARIN SODIUM 5 MG/1
5 TABLET ORAL DAILY
Qty: 90 TABLET | Refills: 1 | Status: SHIPPED | OUTPATIENT
Start: 2025-06-18

## 2025-07-22 ENCOUNTER — HOSPITAL ENCOUNTER (OUTPATIENT)
Dept: RADIOLOGY | Facility: HOSPITAL | Age: 70
Discharge: HOME | End: 2025-07-22
Attending: RADIOLOGY
Payer: MEDICARE

## 2025-07-22 DIAGNOSIS — Z12.31 SCREENING MAMMOGRAM FOR BREAST CANCER: ICD-10-CM

## 2025-07-22 PROCEDURE — 77063 BREAST TOMOSYNTHESIS BI: CPT | Mod: 26,,, | Performed by: RADIOLOGY

## 2025-07-22 PROCEDURE — 77063 BREAST TOMOSYNTHESIS BI: CPT | Mod: TC

## 2025-07-22 PROCEDURE — 77067 SCR MAMMO BI INCL CAD: CPT | Mod: 26,,, | Performed by: RADIOLOGY

## 2025-08-19 ENCOUNTER — OFFICE VISIT (OUTPATIENT)
Dept: FAMILY MEDICINE | Facility: CLINIC | Age: 70
End: 2025-08-19
Payer: MEDICARE

## 2025-08-19 DIAGNOSIS — I25.10 ATHEROSCLEROSIS OF NATIVE CORONARY ARTERY OF NATIVE HEART WITHOUT ANGINA PECTORIS: ICD-10-CM

## 2025-08-19 DIAGNOSIS — I11.9 HYPERTENSIVE HEART DISEASE WITHOUT HEART FAILURE: ICD-10-CM

## 2025-08-19 DIAGNOSIS — M81.0 OSTEOPOROSIS, UNSPECIFIED OSTEOPOROSIS TYPE, UNSPECIFIED PATHOLOGICAL FRACTURE PRESENCE: ICD-10-CM

## 2025-08-19 DIAGNOSIS — Z74.09 OTHER REDUCED MOBILITY: ICD-10-CM

## 2025-08-19 DIAGNOSIS — I10 HYPERTENSION, UNSPECIFIED TYPE: ICD-10-CM

## 2025-08-19 DIAGNOSIS — D3A.8 BENIGN NEUROENDOCRINE TUMOR OF STOMACH: ICD-10-CM

## 2025-08-19 DIAGNOSIS — Z79.01 CURRENT USE OF LONG TERM ANTICOAGULATION: ICD-10-CM

## 2025-08-19 DIAGNOSIS — I85.00 IDIOPATHIC ESOPHAGEAL VARICES WITHOUT BLEEDING: ICD-10-CM

## 2025-08-19 DIAGNOSIS — E03.9 HYPOTHYROIDISM, UNSPECIFIED TYPE: ICD-10-CM

## 2025-08-19 DIAGNOSIS — Z00.00 ENCOUNTER FOR SUBSEQUENT ANNUAL WELLNESS VISIT IN MEDICARE PATIENT: Primary | ICD-10-CM

## 2025-08-19 DIAGNOSIS — N18.31 CHRONIC KIDNEY DISEASE, STAGE 3A: ICD-10-CM

## 2025-08-19 DIAGNOSIS — F32.A DEPRESSIVE DISORDER: ICD-10-CM

## 2025-08-19 DIAGNOSIS — I87.2 VENOUS INSUFFICIENCY (CHRONIC) (PERIPHERAL): ICD-10-CM

## 2025-08-19 PROCEDURE — G0439 PPPS, SUBSEQ VISIT: HCPCS | Mod: ,,, | Performed by: NURSE PRACTITIONER

## 2025-08-20 VITALS
HEART RATE: 67 BPM | RESPIRATION RATE: 18 BRPM | SYSTOLIC BLOOD PRESSURE: 139 MMHG | WEIGHT: 196.19 LBS | BODY MASS INDEX: 31.53 KG/M2 | OXYGEN SATURATION: 97 % | HEIGHT: 66 IN | TEMPERATURE: 99 F | DIASTOLIC BLOOD PRESSURE: 88 MMHG

## 2025-08-20 PROBLEM — I25.118 ATHEROSCLEROSIS OF NATIVE CORONARY ARTERY OF NATIVE HEART WITH STABLE ANGINA PECTORIS: Status: ACTIVE | Noted: 2025-08-20

## (undated) DEVICE — APPLICATOR CHLORAPREP ORN 26ML

## (undated) DEVICE — GLOVE SENSICARE PI SURG 6.5

## (undated) DEVICE — SUT MONOCYRL 4-0 PS2 UND

## (undated) DEVICE — SET MICRO INTRO SHEATH 7F 7CM

## (undated) DEVICE — SET EXT STD BORE CATH 7.6IN

## (undated) DEVICE — BNDG COFLEX FOAM LF2 ST 4X5YD

## (undated) DEVICE — COVER TIP CURVED SCISSORS XI

## (undated) DEVICE — NDL ACCESS 14GA

## (undated) DEVICE — BNDG COFLEX FOAM LF2 ST 6X5YD

## (undated) DEVICE — SET EXTENSION CLEARLINK 2INJ

## (undated) DEVICE — SOL IRRI STRL WATER 1000ML

## (undated) DEVICE — GLOVE SENSICARE PI SURG 8

## (undated) DEVICE — GLOVE SENSICARE PI SURG 7.5

## (undated) DEVICE — Device

## (undated) DEVICE — KIT VENASEAL CLOSURE SYSTEM

## (undated) DEVICE — GLOVE BIOGEL PIMICRO INDIC 8.5

## (undated) DEVICE — BLADE SURG CARBON STEEL SZ11

## (undated) DEVICE — GOWN POLY REINF BRTH SLV XL

## (undated) DEVICE — SCISSOR HOT SHEARS XI

## (undated) DEVICE — PACK SUPERFICIAL VEN PROCEDURE

## (undated) DEVICE — SUT ETHILON 3-0 PS2 18 BLK

## (undated) DEVICE — DRIVER NEEDLE SUTURECUT MEGA

## (undated) DEVICE — SPONGE GAUZE 16PLY 4X4

## (undated) DEVICE — SEAL CANN UNIVERSAL 5-12MM

## (undated) DEVICE — BANDAGE MATRIX HK LOOP 6IN 5YD

## (undated) DEVICE — OBTURATOR BLADELESS 8MM XI CLR

## (undated) DEVICE — PAD PINK TRENDELENBURG POS XL

## (undated) DEVICE — STRIP MEDI WND CLSR 1/2X4IN

## (undated) DEVICE — GLOVE SENSICARE PI GRN 6.5

## (undated) DEVICE — SUT STRATAFIX PDS 2-0 SH 5IN

## (undated) DEVICE — SUT V-LOC 180 GRN GS-21 12IN

## (undated) DEVICE — TAPE DRAPE STRIP CLSR 4.5X24IN

## (undated) DEVICE — COVER PROXIMA MAYO STAND

## (undated) DEVICE — BANDAGE MATRIX HK LOOP 4IN 5YD

## (undated) DEVICE — WARMER BLUE HEAT SCOPE 3-12MM

## (undated) DEVICE — FORCEP FENESTRATED BIPOLAR

## (undated) DEVICE — DRAPE INCISE IOBAN 2 23X23IN

## (undated) DEVICE — GLOVE SENSICARE PI GRN 7.5

## (undated) DEVICE — KIT IV START

## (undated) DEVICE — TRAY CATH 1-LYR URIMTR 16FR

## (undated) DEVICE — BANDAGE GAUZE COT STRL 4.5X4.1

## (undated) DEVICE — CATH IV INTROCAN 22G X 1

## (undated) DEVICE — GOWN NONREINF SET-IN SLV 2XL

## (undated) DEVICE — SOL CONTINU-FLO SET 2 LAV

## (undated) DEVICE — DRAPE ARM DAVINCI XI

## (undated) DEVICE — ADHESIVE MASTISOL VIAL 48/BX

## (undated) DEVICE — COTTONBALL LARGE STRL